# Patient Record
Sex: MALE | Race: WHITE | NOT HISPANIC OR LATINO | Employment: OTHER | URBAN - METROPOLITAN AREA
[De-identification: names, ages, dates, MRNs, and addresses within clinical notes are randomized per-mention and may not be internally consistent; named-entity substitution may affect disease eponyms.]

---

## 2018-12-19 ENCOUNTER — OFFICE VISIT (OUTPATIENT)
Dept: CARDIOLOGY | Facility: CLINIC | Age: 53
End: 2018-12-19

## 2018-12-19 VITALS
HEIGHT: 71 IN | DIASTOLIC BLOOD PRESSURE: 76 MMHG | WEIGHT: 202.8 LBS | BODY MASS INDEX: 28.39 KG/M2 | HEART RATE: 74 BPM | SYSTOLIC BLOOD PRESSURE: 118 MMHG

## 2018-12-19 DIAGNOSIS — R94.31 ABNORMAL EKG: ICD-10-CM

## 2018-12-19 DIAGNOSIS — I35.0 NONRHEUMATIC AORTIC VALVE STENOSIS: ICD-10-CM

## 2018-12-19 DIAGNOSIS — Q23.1 BICUSPID AORTIC VALVE: Primary | ICD-10-CM

## 2018-12-19 PROCEDURE — 99204 OFFICE O/P NEW MOD 45 MIN: CPT | Performed by: INTERNAL MEDICINE

## 2018-12-19 PROCEDURE — 93000 ELECTROCARDIOGRAM COMPLETE: CPT | Performed by: INTERNAL MEDICINE

## 2018-12-19 RX ORDER — PRAVASTATIN SODIUM 20 MG
TABLET ORAL
COMMUNITY
Start: 2018-11-29 | End: 2019-09-17 | Stop reason: ALTCHOICE

## 2018-12-26 ENCOUNTER — HOSPITAL ENCOUNTER (OUTPATIENT)
Dept: CT IMAGING | Facility: HOSPITAL | Age: 53
Discharge: HOME | End: 2018-12-26
Attending: INTERNAL MEDICINE

## 2018-12-26 ENCOUNTER — HOSPITAL ENCOUNTER (OUTPATIENT)
Dept: CARDIOLOGY | Facility: HOSPITAL | Age: 53
Discharge: HOME | End: 2018-12-26
Attending: INTERNAL MEDICINE | Admitting: INTERNAL MEDICINE

## 2018-12-26 VITALS — HEIGHT: 71 IN | HEART RATE: 68 BPM | WEIGHT: 200 LBS | BODY MASS INDEX: 28 KG/M2

## 2018-12-26 DIAGNOSIS — I35.0 NONRHEUMATIC AORTIC VALVE STENOSIS: ICD-10-CM

## 2018-12-26 DIAGNOSIS — Q23.1 BICUSPID AORTIC VALVE: ICD-10-CM

## 2018-12-26 LAB
ASCENDING AORTA: 3.3 CM
BH CV ECHO MEAS - AO MAX PG (FULL): 73.7 MMHG
BH CV ECHO MEAS - AO MAX PG: 80 MMHG
BH CV ECHO MEAS - AO MEAN PG (FULL): 32.8 MMHG
BH CV ECHO MEAS - AO MEAN PG: 37 MMHG
BH CV ECHO MEAS - AO ROOT AREA (BSA CORRECTED): 0.53
BH CV ECHO MEAS - AO ROOT AREA: 0.97 CM^2
BH CV ECHO MEAS - AO ROOT DIAM: 1.1 CM
BH CV ECHO MEAS - AO V2 MAX: 449.3 CM/SEC
BH CV ECHO MEAS - AO V2 MEAN: 290.2 CM/SEC
BH CV ECHO MEAS - AO V2 VTI: 88.9 CM
BH CV ECHO MEAS - AVA(I,A): 0.93 CM^2
BH CV ECHO MEAS - AVA(I,D): 0.93 CM^2
BH CV ECHO MEAS - AVA(V,A): 0.77 CM^2
BH CV ECHO MEAS - AVA(V,D): 0.77 CM^2
BH CV ECHO MEAS - BSA(HAYCOCK): 2.1 M^2
BH CV ECHO MEAS - BSA: 2.1 M^2
BH CV ECHO MEAS - BZI_BMI: 27.9 KILOGRAMS/M^2
BH CV ECHO MEAS - BZI_METRIC_HEIGHT: 180.3 CM
BH CV ECHO MEAS - BZI_METRIC_WEIGHT: 90.7 KG
BH CV ECHO MEAS - EDV(MOD-SP2): 83 ML
BH CV ECHO MEAS - EDV(MOD-SP4): 86 ML
BH CV ECHO MEAS - EDV(TEICH): 142.3 ML
BH CV ECHO MEAS - EF(CUBED): 75.7 %
BH CV ECHO MEAS - EF(MOD-SP2): 69.9 %
BH CV ECHO MEAS - EF(MOD-SP4): 69.8 %
BH CV ECHO MEAS - EF(TEICH): 67.1 %
BH CV ECHO MEAS - ESV(MOD-SP2): 25 ML
BH CV ECHO MEAS - ESV(MOD-SP4): 26 ML
BH CV ECHO MEAS - ESV(TEICH): 46.8 ML
BH CV ECHO MEAS - IVS/LVPW: 1.2
BH CV ECHO MEAS - IVSD: 1.5 CM
BH CV ECHO MEAS - LAT PEAK E' VEL: 10 CM/SEC
BH CV ECHO MEAS - LV DIASTOLIC VOL/BSA (35-75): 40.8 ML/M^2
BH CV ECHO MEAS - LV MASS(C)D: 317.7 GRAMS
BH CV ECHO MEAS - LV MASS(C)DI: 150.6 GRAMS/M^2
BH CV ECHO MEAS - LV MAX PG: 7 MMHG
BH CV ECHO MEAS - LV MEAN PG: 4.8 MMHG
BH CV ECHO MEAS - LV SYSTOLIC VOL/BSA (12-30): 12.3 ML/M^2
BH CV ECHO MEAS - LV V1 MAX: 132.4 CM/SEC
BH CV ECHO MEAS - LV V1 MEAN: 102.3 CM/SEC
BH CV ECHO MEAS - LV V1 VTI: 31.7 CM
BH CV ECHO MEAS - LVIDD: 5.4 CM
BH CV ECHO MEAS - LVIDS: 3.4 CM
BH CV ECHO MEAS - LVLD AP2: 7.4 CM
BH CV ECHO MEAS - LVLD AP4: 7.9 CM
BH CV ECHO MEAS - LVLS AP2: 6.2 CM
BH CV ECHO MEAS - LVLS AP4: 5.9 CM
BH CV ECHO MEAS - LVOT AREA (M): 2.5 CM^2
BH CV ECHO MEAS - LVOT AREA: 2.6 CM^2
BH CV ECHO MEAS - LVOT DIAM: 1.8 CM
BH CV ECHO MEAS - LVPWD: 1.3 CM
BH CV ECHO MEAS - MED PEAK E' VEL: 7 CM/SEC
BH CV ECHO MEAS - MV A DUR: 0.12 SEC
BH CV ECHO MEAS - MV A MAX VEL: 92.6 CM/SEC
BH CV ECHO MEAS - MV DEC SLOPE: 429.1 CM/SEC^2
BH CV ECHO MEAS - MV DEC TIME: 0.21 SEC
BH CV ECHO MEAS - MV E MAX VEL: 96.5 CM/SEC
BH CV ECHO MEAS - MV E/A: 1
BH CV ECHO MEAS - MV MAX PG: 4.6 MMHG
BH CV ECHO MEAS - MV MEAN PG: 2.3 MMHG
BH CV ECHO MEAS - MV P1/2T MAX VEL: 94.6 CM/SEC
BH CV ECHO MEAS - MV P1/2T: 64.6 MSEC
BH CV ECHO MEAS - MV V2 MAX: 107.2 CM/SEC
BH CV ECHO MEAS - MV V2 MEAN: 71.1 CM/SEC
BH CV ECHO MEAS - MV V2 VTI: 36.1 CM
BH CV ECHO MEAS - MVA P1/2T LCG: 2.3 CM^2
BH CV ECHO MEAS - MVA(P1/2T): 3.4 CM^2
BH CV ECHO MEAS - MVA(VTI): 2.3 CM^2
BH CV ECHO MEAS - PA MAX PG (FULL): 9.7 MMHG
BH CV ECHO MEAS - PA MAX PG: 12.1 MMHG
BH CV ECHO MEAS - PA V2 MAX: 173.7 CM/SEC
BH CV ECHO MEAS - PULM A REVS DUR: 0.11 SEC
BH CV ECHO MEAS - PULM A REVS VEL: 28.6 CM/SEC
BH CV ECHO MEAS - PULM DIAS VEL: 59 CM/SEC
BH CV ECHO MEAS - PULM S/D: 1
BH CV ECHO MEAS - PULM SYS VEL: 59 CM/SEC
BH CV ECHO MEAS - PVA(V,A): 1.7 CM^2
BH CV ECHO MEAS - PVA(V,D): 1.7 CM^2
BH CV ECHO MEAS - QP/QS: 0.74
BH CV ECHO MEAS - RAP SYSTOLE: 3 MMHG
BH CV ECHO MEAS - RV MAX PG: 2.4 MMHG
BH CV ECHO MEAS - RV MEAN PG: 1.4 MMHG
BH CV ECHO MEAS - RV V1 MAX: 77.1 CM/SEC
BH CV ECHO MEAS - RV V1 MEAN: 53.5 CM/SEC
BH CV ECHO MEAS - RV V1 VTI: 16.4 CM
BH CV ECHO MEAS - RVOT AREA: 3.7 CM^2
BH CV ECHO MEAS - RVOT DIAM: 2.2 CM
BH CV ECHO MEAS - RVSP: 30 MMHG
BH CV ECHO MEAS - SI(AO): 41 ML/M^2
BH CV ECHO MEAS - SI(CUBED): 57 ML/M^2
BH CV ECHO MEAS - SI(LVOT): 39.3 ML/M^2
BH CV ECHO MEAS - SI(MOD-SP2): 27.5 ML/M^2
BH CV ECHO MEAS - SI(MOD-SP4): 28.5 ML/M^2
BH CV ECHO MEAS - SI(TEICH): 45.3 ML/M^2
BH CV ECHO MEAS - SUP REN AO DIAM: 1.6 CM
BH CV ECHO MEAS - SV(AO): 86.4 ML
BH CV ECHO MEAS - SV(CUBED): 120.2 ML
BH CV ECHO MEAS - SV(LVOT): 82.8 ML
BH CV ECHO MEAS - SV(MOD-SP2): 58 ML
BH CV ECHO MEAS - SV(MOD-SP4): 60 ML
BH CV ECHO MEAS - SV(RVOT): 61 ML
BH CV ECHO MEAS - SV(TEICH): 95.5 ML
BH CV ECHO MEAS - TAPSE (>1.6): 1.9 CM2
BH CV ECHO MEAS - TR MAX VEL: 260.6 CM/SEC
BH CV ECHO MEASUREMENTS AVERAGE E/E' RATIO: 11.35
BH CV XLRA - RV BASE: 3 CM
BH CV XLRA - TDI S': 14 CM/SEC
LEFT ATRIUM VOLUME INDEX: 22 ML/M2
SINUS: 3.4 CM
STJ: 3.5 CM

## 2018-12-26 PROCEDURE — 93306 TTE W/DOPPLER COMPLETE: CPT | Performed by: INTERNAL MEDICINE

## 2018-12-26 PROCEDURE — 93306 TTE W/DOPPLER COMPLETE: CPT

## 2018-12-26 NOTE — PROGRESS NOTES
Pt here for CT angio chest, given informed consent for iv contrast.  Pt on metformin, instructed to not take for 48 hr post angio and to call primary MD to get OK to resume metformin.  Pt refuses to get contrast, call to Dr Win to inform of this.  CT cancelled by Dr Win

## 2019-01-06 NOTE — PROGRESS NOTES
Date of Office Visit: 2018  Encounter Provider: Brayden Win MD  Place of Service: [facility] CARDIOLOGY  Patient Name: Brigido Caraballo  :1965    Chief complaint: Bicuspid aortic valve, aortic stenosis, abnormal baseline EKG    History of Present Illness:    I had the pleasure of seeing the patient in cardiology office on 2018.  He is a   very pleasant 53 year-old white male with a past medical history significant for a   bicuspid aortic valve, aortic stenosis, diabetes, and hypertension who presents to   \[facility]\"" care.  The patient has formerly followed with Dr. Phani Forde at [facility], but will be switching care to me today.    Patient has a history of a bicuspid aortic valve with aortic stenosis.  His echo on   2015 showed a normal ejection fraction, a bicuspid aortic valve, and mild to   moderate aortic stenosis with a peak gradient of 34 mmHg, and a mean gradient   of 20 mmHg.  He has never had symptoms from the aortic stenosis in the past.    He also has had diabetes since , and has hypertension.  He quit smoking in   2011, although he smoked approximately one pack of cigarettes per day   for 25 years prior to that.  He does have obstructive sleep apnea, although he is   intolerant to CPAP machine.  He also has baseline T-wave inversions in his   anterolateral leads, which appear to be consistent from his prior EKG on 2015.    On direct questioning, he has had no chest pain, near-syncope, or syncope.  He   does state that he gets short of breath when going up and down steps.  However,   this has not changed significantly within the last year.  He does take antibiotics   prior to dental appointments as well.    Past Medical History:   Diagnosis Date   • Abnormal EKG     Baseline anterolateral T wave abnormalities    • Aortic stenosis    • Bicuspid aortic valve    • Diabetes (CMS/[facility])    • Hyperlipidemia    •  Hypertension    • ZENON (obstructive sleep apnea)     Intolerant to CPAP       History reviewed. No pertinent surgical history.    Current Outpatient Medications on File Prior to Visit   Medication Sig Dispense Refill   • aspirin 81 MG EC tablet Take 81 mg by mouth Daily.     • Coenzyme Q10 10 MG capsule Take 200 mg by mouth Daily.     • Flaxseed, Linseed, (FLAX SEEDS PO) Take  by mouth.     • Garlic 500 MG tablet Take 1 tablet by mouth Daily.     • Dalila, Zingiber officinalis, (DALILA ROOT) 500 MG capsule Take  by mouth Daily.     • metFORMIN (GLUCOPHAGE) 500 MG tablet Take 500 mg by mouth 2 (Two) Times a Day.     • pravastatin (PRAVACHOL) 20 MG tablet      • propranolol (INDERAL) 10 MG tablet Take 10 mg by mouth 3 (Three) Times a Day.     • Cholecalciferol (VITAMIN D3) 5000 units capsule capsule Take  by mouth Daily.       No current facility-administered medications on file prior to visit.      Allergies as of 12/19/2018   • (Not on File)     Social History     Socioeconomic History   • Marital status:      Spouse name: Not on file   • Number of children: Not on file   • Years of education: Not on file   • Highest education level: Not on file   Social Needs   • Financial resource strain: Not on file   • Food insecurity - worry: Not on file   • Food insecurity - inability: Not on file   • Transportation needs - medical: Not on file   • Transportation needs - non-medical: Not on file   Occupational History   • Not on file   Tobacco Use   • Smoking status: Former Smoker   • Smokeless tobacco: Never Used   • Tobacco comment: Smoked 1 ppd for 25 years - quit 2/2011   Substance and Sexual Activity   • Alcohol use: No     Frequency: Never   • Drug use: No   • Sexual activity: Not on file   Other Topics Concern   • Not on file   Social History Narrative   • Not on file     Family History   Problem Relation Age of Onset   • Coronary artery disease Mother    • Aortic stenosis Mother    • Stroke Mother    • Diabetes  "Father    • Stroke Father        Review of Systems   Constitution: Positive for malaise/fatigue.   Eyes: Positive for blurred vision.   Cardiovascular: Positive for dyspnea on exertion.   Respiratory: Positive for snoring.    Musculoskeletal: Positive for joint pain.   Genitourinary: Positive for frequency.   Neurological: Positive for excessive daytime sleepiness.   All other systems reviewed and are negative.     Objective:     Vitals:    12/19/18 1303   BP: 118/76   Pulse: 74   Weight: 92 kg (202 lb 12.8 oz)   Height: 180.3 cm (71\")     Body mass index is 28.28 kg/m².    Physical Exam   Constitutional: He is oriented to person, place, and time. He appears well-developed and well-nourished.   HENT:   Head: Normocephalic and atraumatic.   Eyes: Conjunctivae are normal.   Neck: Neck supple.   Cardiovascular: Normal rate and regular rhythm. Exam reveals no gallop and no friction rub.   Murmur heard.   Systolic murmur is present with a grade of 3/6 at the upper right sternal border, upper left sternal border and lower left sternal border.  Pulmonary/Chest: Effort normal and breath sounds normal.   Abdominal: Soft. There is no tenderness.   Musculoskeletal: He exhibits no edema.   Neurological: He is alert and oriented to person, place, and time.   Skin: Skin is warm.   Psychiatric: He has a normal mood and affect. His behavior is normal.     Lab Review:     ECG 12 Lead  Date/Time: 12/19/2018 1:26 PM  Performed by: Brayden Win MD  Authorized by: Brayden Win MD   Comparison: compared with previous ECG from 9/25/2015  Comparison to previous ECG: Compared to the previous study, T wave changes are less prominent in the inferior leads currently  Rhythm: sinus rhythm  Rate: normal  BPM: 74  Clinical impression: abnormal ECG  Comments: T wave changes in the anterolateral leads, consider ischemia             Cardiac Procedures:  1.  Lexiscan cardiac stress test on 9/25/2015: Normal with no ischemia.  2.  " Echocardiogram on 9/25/2015: Ejection fraction 63%.  There was normal diastolic   function.  There was a bicuspid aortic valve.  There was mild to moderate aortic stenosis   with a peak gradient of 34 mmHg, and a mean gradient of 20 mmHg.    Assessment:       Diagnosis Plan   1. Bicuspid aortic valve  Adult Transthoracic Echo Complete W/ Cont if Necessary Per Protocol   2. Nonrheumatic aortic valve stenosis  Adult Transthoracic Echo Complete W/ Cont if Necessary Per Protocol   3. Abnormal EKG       Plan:       The patient has not seen Dr. Forde since September 2015.  Again, his main issue is   shortness of breath with both going up and down stairs.  However, this has not changed   significantly.  He has had no chest pain, syncope, or near syncope.  His last echo in 2015   showed mild to moderate aortic stenosis.  I have recommended rechecking an   echocardiogram at this point.  He also has not had any evaluation of his thoracic aorta   recently.  Given the association of bicuspid aortic valve to thoracic aneurysms, I would   like to also check a CT angiogram of his chest.  I did advise him to take prophylactic   antibiotics prior to dental appointments and dental cleanings, which he already does.  His   blood pressure appears to be controlled.  Again, his baseline EKG does have significant   T-wave inversion in the anterolateral leads, although this appears to be consistent with   prior EKGs as well.  Further plans will be made pending his testing.  I will likely see him   back in 6 months.

## 2019-08-07 ENCOUNTER — TELEPHONE (OUTPATIENT)
Dept: CARDIOLOGY | Facility: CLINIC | Age: 54
End: 2019-08-07

## 2019-08-07 DIAGNOSIS — R07.2 PRECORDIAL PAIN: ICD-10-CM

## 2019-08-07 DIAGNOSIS — I35.0 NONRHEUMATIC AORTIC VALVE STENOSIS: Primary | ICD-10-CM

## 2019-08-07 NOTE — TELEPHONE ENCOUNTER
"Brigido Caraballo   1965  Home Phone [phone]   Mobile [phone]       Lilia Berry.    I'm one of the new Triage RNs. Brigido has an appointment with you August 19th. He is having \"pinching\" chest pain of 3-4 on exertion and in the heat outside. He called to ask if you like him to have any testing completed before his appt on the 19th or the day of the appt?    Vikki Lou, RN  Triage RN [facility]  "

## 2019-08-07 NOTE — TELEPHONE ENCOUNTER
Lilia,    Will you call and schedule Mr. Caraballo for an echo and nuclear stress test before his appointment with STEVIE on the 19th?    Thank you!    Vikki Rosario RN [facility]

## 2019-08-12 ENCOUNTER — HOSPITAL ENCOUNTER (OUTPATIENT)
Dept: CARDIOLOGY | Facility: HOSPITAL | Age: 54
Discharge: HOME | End: 2019-08-12

## 2019-08-12 ENCOUNTER — HOSPITAL ENCOUNTER (OUTPATIENT)
Dept: CARDIOLOGY | Facility: HOSPITAL | Age: 54
Discharge: HOME | End: 2019-08-12
Admitting: NURSE PRACTITIONER

## 2019-08-12 VITALS — HEIGHT: 70 IN | BODY MASS INDEX: 28.63 KG/M2 | WEIGHT: 200 LBS | HEART RATE: 70 BPM

## 2019-08-12 VITALS — BODY MASS INDEX: 28.63 KG/M2 | HEIGHT: 70 IN | WEIGHT: 199.96 LBS

## 2019-08-12 DIAGNOSIS — I35.0 NONRHEUMATIC AORTIC VALVE STENOSIS: ICD-10-CM

## 2019-08-12 DIAGNOSIS — R07.2 PRECORDIAL PAIN: ICD-10-CM

## 2019-08-12 LAB
AORTIC ARCH: 2.9 CM
AORTIC DIMENSIONLESS INDEX: 0.3 (DI)
AORTIC ROOT ANNULUS: 2.2 CM
ASCENDING AORTA: 4.1 CM
BH CV ECHO MEAS - ACS: 1.5 CM
BH CV ECHO MEAS - AO MAX PG (FULL): 51.8 MMHG
BH CV ECHO MEAS - AO MAX PG: 57 MMHG
BH CV ECHO MEAS - AO MEAN PG (FULL): 30.2 MMHG
BH CV ECHO MEAS - AO MEAN PG: 34 MMHG
BH CV ECHO MEAS - AO V2 MAX: 377.6 CM/SEC
BH CV ECHO MEAS - AO V2 MEAN: 267.4 CM/SEC
BH CV ECHO MEAS - AO V2 VTI: 83.8 CM
BH CV ECHO MEAS - AVA(I,A): 1 CM^2
BH CV ECHO MEAS - AVA(I,D): 1 CM^2
BH CV ECHO MEAS - AVA(V,A): 0.96 CM^2
BH CV ECHO MEAS - AVA(V,D): 0.96 CM^2
BH CV ECHO MEAS - BSA(HAYCOCK): 2.1 M^2
BH CV ECHO MEAS - BSA: 2.1 M^2
BH CV ECHO MEAS - BZI_BMI: 27.9 KILOGRAMS/M^2
BH CV ECHO MEAS - BZI_METRIC_HEIGHT: 180.3 CM
BH CV ECHO MEAS - BZI_METRIC_WEIGHT: 90.7 KG
BH CV ECHO MEAS - EDV(MOD-SP2): 93 ML
BH CV ECHO MEAS - EDV(MOD-SP4): 104 ML
BH CV ECHO MEAS - EDV(TEICH): 113.7 ML
BH CV ECHO MEAS - EF(CUBED): 87.5 %
BH CV ECHO MEAS - EF(MOD-BP): 67 %
BH CV ECHO MEAS - EF(MOD-SP2): 66.7 %
BH CV ECHO MEAS - EF(MOD-SP4): 65.4 %
BH CV ECHO MEAS - EF(TEICH): 81.2 %
BH CV ECHO MEAS - ESV(MOD-SP2): 31 ML
BH CV ECHO MEAS - ESV(MOD-SP4): 36 ML
BH CV ECHO MEAS - ESV(TEICH): 21.4 ML
BH CV ECHO MEAS - FS: 50 %
BH CV ECHO MEAS - IVS/LVPW: 1.1
BH CV ECHO MEAS - IVSD: 1.2 CM
BH CV ECHO MEAS - LAT PEAK E' VEL: 10 CM/SEC
BH CV ECHO MEAS - LV DIASTOLIC VOL/BSA (35-75): 49.3 ML/M^2
BH CV ECHO MEAS - LV MASS(C)D: 215 GRAMS
BH CV ECHO MEAS - LV MASS(C)DI: 102 GRAMS/M^2
BH CV ECHO MEAS - LV MAX PG: 5.3 MMHG
BH CV ECHO MEAS - LV MEAN PG: 3.4 MMHG
BH CV ECHO MEAS - LV SYSTOLIC VOL/BSA (12-30): 17.1 ML/M^2
BH CV ECHO MEAS - LV V1 MAX: 115 CM/SEC
BH CV ECHO MEAS - LV V1 MEAN: 89 CM/SEC
BH CV ECHO MEAS - LV V1 VTI: 27.9 CM
BH CV ECHO MEAS - LVIDD: 4.9 CM
BH CV ECHO MEAS - LVIDS: 2.5 CM
BH CV ECHO MEAS - LVLD AP2: 8.6 CM
BH CV ECHO MEAS - LVLD AP4: 8.3 CM
BH CV ECHO MEAS - LVLS AP2: 7.3 CM
BH CV ECHO MEAS - LVLS AP4: 7.3 CM
BH CV ECHO MEAS - LVOT AREA (M): 3.1 CM^2
BH CV ECHO MEAS - LVOT AREA: 3.1 CM^2
BH CV ECHO MEAS - LVOT DIAM: 2 CM
BH CV ECHO MEAS - LVPWD: 1.1 CM
BH CV ECHO MEAS - MED PEAK E' VEL: 9 CM/SEC
BH CV ECHO MEAS - MR MAX PG: 26.4 MMHG
BH CV ECHO MEAS - MR MAX VEL: 257 CM/SEC
BH CV ECHO MEAS - MV A DUR: 0.12 SEC
BH CV ECHO MEAS - MV A MAX VEL: 73.2 CM/SEC
BH CV ECHO MEAS - MV DEC SLOPE: 516.6 CM/SEC^2
BH CV ECHO MEAS - MV DEC TIME: 0.16 SEC
BH CV ECHO MEAS - MV E MAX VEL: 91.7 CM/SEC
BH CV ECHO MEAS - MV E/A: 1.3
BH CV ECHO MEAS - MV MAX PG: 4 MMHG
BH CV ECHO MEAS - MV MEAN PG: 2.1 MMHG
BH CV ECHO MEAS - MV P1/2T MAX VEL: 90.7 CM/SEC
BH CV ECHO MEAS - MV P1/2T: 51.4 MSEC
BH CV ECHO MEAS - MV V2 MAX: 100.1 CM/SEC
BH CV ECHO MEAS - MV V2 MEAN: 66.9 CM/SEC
BH CV ECHO MEAS - MV V2 VTI: 32.7 CM
BH CV ECHO MEAS - MVA P1/2T LCG: 2.4 CM^2
BH CV ECHO MEAS - MVA(P1/2T): 4.3 CM^2
BH CV ECHO MEAS - MVA(VTI): 2.7 CM^2
BH CV ECHO MEAS - PA ACC TIME: 0.15 SEC
BH CV ECHO MEAS - PA MAX PG (FULL): 5.2 MMHG
BH CV ECHO MEAS - PA MAX PG: 7.5 MMHG
BH CV ECHO MEAS - PA PR(ACCEL): 10.9 MMHG
BH CV ECHO MEAS - PA V2 MAX: 136.7 CM/SEC
BH CV ECHO MEAS - PULM A REVS DUR: 0.07 SEC
BH CV ECHO MEAS - PULM A REVS VEL: 24.7 CM/SEC
BH CV ECHO MEAS - PULM DIAS VEL: 40.7 CM/SEC
BH CV ECHO MEAS - PULM S/D: 1.4
BH CV ECHO MEAS - PULM SYS VEL: 57.2 CM/SEC
BH CV ECHO MEAS - PVA(V,A): 1.7 CM^2
BH CV ECHO MEAS - PVA(V,D): 1.7 CM^2
BH CV ECHO MEAS - QP/QS: 0.61
BH CV ECHO MEAS - RAP SYSTOLE: 3 MMHG
BH CV ECHO MEAS - RV MAX PG: 2.2 MMHG
BH CV ECHO MEAS - RV MEAN PG: 1.3 MMHG
BH CV ECHO MEAS - RV V1 MAX: 74.7 CM/SEC
BH CV ECHO MEAS - RV V1 MEAN: 53.9 CM/SEC
BH CV ECHO MEAS - RV V1 VTI: 16.9 CM
BH CV ECHO MEAS - RVOT AREA: 3.1 CM^2
BH CV ECHO MEAS - RVOT DIAM: 2 CM
BH CV ECHO MEAS - RVSP: 29 MMHG
BH CV ECHO MEAS - SI(CUBED): 49.3 ML/M^2
BH CV ECHO MEAS - SI(LVOT): 41.6 ML/M^2
BH CV ECHO MEAS - SI(MOD-SP2): 29.4 ML/M^2
BH CV ECHO MEAS - SI(MOD-SP4): 32.2 ML/M^2
BH CV ECHO MEAS - SI(TEICH): 43.8 ML/M^2
BH CV ECHO MEAS - SUP REN AO DIAM: 2 CM
BH CV ECHO MEAS - SV(CUBED): 104 ML
BH CV ECHO MEAS - SV(LVOT): 87.8 ML
BH CV ECHO MEAS - SV(MOD-SP2): 62 ML
BH CV ECHO MEAS - SV(MOD-SP4): 68 ML
BH CV ECHO MEAS - SV(RVOT): 53.1 ML
BH CV ECHO MEAS - SV(TEICH): 92.3 ML
BH CV ECHO MEAS - TAPSE (>1.6): 2.1 CM2
BH CV ECHO MEAS - TR MAX VEL: 256 CM/SEC
BH CV ECHO MEASUREMENTS AVERAGE E/E' RATIO: 9.65
BH CV NUCLEAR PRIOR STUDY: 3
BH CV STRESS BP STAGE 1: NORMAL
BH CV STRESS DURATION MIN STAGE 1: 3
BH CV STRESS DURATION MIN STAGE 2: 1
BH CV STRESS DURATION SEC STAGE 1: 0
BH CV STRESS DURATION SEC STAGE 2: 30
BH CV STRESS GRADE STAGE 1: 10
BH CV STRESS GRADE STAGE 2: 12
BH CV STRESS HR STAGE 1: 112
BH CV STRESS HR STAGE 2: 120
BH CV STRESS METS STAGE 1: 5
BH CV STRESS METS STAGE 2: 7.5
BH CV STRESS PROTOCOL 1: NORMAL
BH CV STRESS RECOVERY BP: NORMAL MMHG
BH CV STRESS RECOVERY HR: 74 BPM
BH CV STRESS SPEED STAGE 1: 1.7
BH CV STRESS SPEED STAGE 2: 2.5
BH CV STRESS STAGE 1: 1
BH CV STRESS STAGE 2: 2
BH CV XLRA - RV BASE: 2.6 CM
BH CV XLRA - TDI S': 10 CM/SEC
LEFT ATRIUM VOLUME INDEX: 22 ML/M2
LV EF NUC BP: 59 %
MAXIMAL PREDICTED HEART RATE: 168 BPM
MAXIMAL PREDICTED HEART RATE: 168 BPM
PERCENT MAX PREDICTED HR: 71.43 %
SINUS: 3.6 CM
STJ: 2.9 CM
STRESS BASELINE BP: NORMAL MMHG
STRESS BASELINE HR: 71 BPM
STRESS PERCENT HR: 84 %
STRESS POST ESTIMATED WORKLOAD: 6 METS
STRESS POST EXERCISE DUR MIN: 4 MIN
STRESS POST EXERCISE DUR SEC: 30 SEC
STRESS POST PEAK BP: NORMAL MMHG
STRESS POST PEAK HR: 120 BPM
STRESS TARGET HR: 143 BPM
STRESS TARGET HR: 143 BPM

## 2019-08-12 PROCEDURE — 93016 CV STRESS TEST SUPVJ ONLY: CPT | Performed by: INTERNAL MEDICINE

## 2019-08-12 PROCEDURE — 0 TECHNETIUM TETROFOSMIN KIT: Performed by: NURSE PRACTITIONER

## 2019-08-12 PROCEDURE — A9502 TC99M TETROFOSMIN: HCPCS | Performed by: NURSE PRACTITIONER

## 2019-08-12 PROCEDURE — 93306 TTE W/DOPPLER COMPLETE: CPT | Performed by: INTERNAL MEDICINE

## 2019-08-12 PROCEDURE — 93018 CV STRESS TEST I&R ONLY: CPT | Performed by: INTERNAL MEDICINE

## 2019-08-12 PROCEDURE — 78452 HT MUSCLE IMAGE SPECT MULT: CPT | Performed by: INTERNAL MEDICINE

## 2019-08-12 PROCEDURE — 93306 TTE W/DOPPLER COMPLETE: CPT

## 2019-08-12 PROCEDURE — 93017 CV STRESS TEST TRACING ONLY: CPT

## 2019-08-12 PROCEDURE — 78452 HT MUSCLE IMAGE SPECT MULT: CPT

## 2019-08-12 RX ADMIN — TETROFOSMIN 1 DOSE: 1.38 INJECTION, POWDER, LYOPHILIZED, FOR SOLUTION INTRAVENOUS at 12:45

## 2019-08-12 RX ADMIN — TETROFOSMIN 1 DOSE: 1.38 INJECTION, POWDER, LYOPHILIZED, FOR SOLUTION INTRAVENOUS at 12:01

## 2019-08-13 DIAGNOSIS — I77.810 ASCENDING AORTA DILATATION (HCC): Primary | ICD-10-CM

## 2019-08-14 ENCOUNTER — TELEPHONE (OUTPATIENT)
Dept: CARDIOLOGY | Facility: CLINIC | Age: 54
End: 2019-08-14

## 2019-09-09 ENCOUNTER — HOSPITAL ENCOUNTER (OUTPATIENT)
Dept: CT IMAGING | Facility: HOSPITAL | Age: 54
Discharge: HOME | End: 2019-09-09
Admitting: INTERNAL MEDICINE

## 2019-09-09 DIAGNOSIS — I77.810 ASCENDING AORTA DILATATION (HCC): ICD-10-CM

## 2019-09-09 PROCEDURE — 71250 CT THORAX DX C-: CPT

## 2019-09-10 DIAGNOSIS — I35.0 NONRHEUMATIC AORTIC VALVE STENOSIS: ICD-10-CM

## 2019-09-10 DIAGNOSIS — I71.21 ASCENDING AORTIC ANEURYSM (HCC): Primary | ICD-10-CM

## 2019-09-11 NOTE — H&P (VIEW-ONLY)
Called and spoke to the patient and his wife.  He needs surgical referral for the aortic aneurysm.  I also feel he is symptomatic from his AS.  I am going to refer him to Dr. Shell.  Consultation order has been placed.    GM

## 2019-09-17 ENCOUNTER — OFFICE VISIT (OUTPATIENT)
Dept: CARDIAC SURGERY | Facility: CLINIC | Age: 54
End: 2019-09-17

## 2019-09-17 VITALS
WEIGHT: 200 LBS | SYSTOLIC BLOOD PRESSURE: 127 MMHG | DIASTOLIC BLOOD PRESSURE: 84 MMHG | BODY MASS INDEX: 28 KG/M2 | RESPIRATION RATE: 20 BRPM | HEART RATE: 62 BPM | OXYGEN SATURATION: 97 % | TEMPERATURE: 98.3 F | HEIGHT: 71 IN

## 2019-09-17 DIAGNOSIS — E11.40 TYPE 2 DIABETES MELLITUS WITH DIABETIC NEUROPATHY, WITHOUT LONG-TERM CURRENT USE OF INSULIN (HCC): ICD-10-CM

## 2019-09-17 DIAGNOSIS — I10 HTN (HYPERTENSION), BENIGN: ICD-10-CM

## 2019-09-17 DIAGNOSIS — E11.9 TYPE 2 DIABETES MELLITUS WITHOUT COMPLICATION, WITHOUT LONG-TERM CURRENT USE OF INSULIN (HCC): ICD-10-CM

## 2019-09-17 DIAGNOSIS — I71.21 ASCENDING AORTIC ANEURYSM (HCC): ICD-10-CM

## 2019-09-17 DIAGNOSIS — R94.31 ABNORMAL EKG: ICD-10-CM

## 2019-09-17 DIAGNOSIS — Q23.1 BICUSPID AORTIC VALVE: ICD-10-CM

## 2019-09-17 DIAGNOSIS — E66.9 OBESITY (BMI 30-39.9): ICD-10-CM

## 2019-09-17 DIAGNOSIS — I71.21 ASCENDING AORTIC ANEURYSM (HCC): Primary | ICD-10-CM

## 2019-09-17 DIAGNOSIS — G47.33 OSA (OBSTRUCTIVE SLEEP APNEA): ICD-10-CM

## 2019-09-17 DIAGNOSIS — I35.0 NONRHEUMATIC AORTIC VALVE STENOSIS: ICD-10-CM

## 2019-09-17 DIAGNOSIS — I35.0 AORTIC STENOSIS, SEVERE: Primary | ICD-10-CM

## 2019-09-17 PROBLEM — E11.49 TYPE 2 DIABETES MELLITUS WITH NEUROLOGIC COMPLICATION, WITHOUT LONG-TERM CURRENT USE OF INSULIN: Status: ACTIVE | Noted: 2019-09-17

## 2019-09-17 PROBLEM — Q23.81 BICUSPID AORTIC VALVE: Status: ACTIVE | Noted: 2019-09-17

## 2019-09-17 PROBLEM — E11.49 TYPE 2 DIABETES MELLITUS WITH NEUROLOGIC COMPLICATION, WITHOUT LONG-TERM CURRENT USE OF INSULIN: Status: RESOLVED | Noted: 2019-09-17 | Resolved: 2019-09-17

## 2019-09-17 PROCEDURE — 99205 OFFICE O/P NEW HI 60 MIN: CPT | Performed by: THORACIC SURGERY (CARDIOTHORACIC VASCULAR SURGERY)

## 2019-09-17 RX ORDER — ATORVASTATIN CALCIUM 10 MG/1
10 TABLET, FILM COATED ORAL NIGHTLY
COMMUNITY
Start: 2019-09-13 | End: 2020-11-30

## 2019-09-17 NOTE — PROGRESS NOTES
9/17/2019    Chief Complaint     Evaluation of aortic stenosis    History of Present Illness:       Dear Dr. Win, Brayden YARBROUGH MD and Colleagues,  It was nice to see Brigido Caraballo in consultation at your request. He is a 54 y.o. male with a history of diabetes, obesity, hypertension, hyperlipidemia, ZENON and known heart murmur with mild to moderate aortic stenosis who has developed progressive dyspnea of exertion and fatigue.  She was last seen by cardiology in 2015 when he was diagnosed with mild aortic stenosis.  He was lost to follow-up.  He states he fatigues easily, he notices a decline and he has dyspnea with exertion that worsens with climbing stairs.  He had an EKG that showed ST depressions.  He had a nuclear stress test that showed ST and T wave abnormalities on exertion and developed chest pain. He denies TIA, syncope, angina, PND, lower extremity edema or palpitations.  He had an echocardiogram that showed severe stenosis and mean gradient of 34 mmHg, aortic valve area of 1.0 cm², aortic valve dimensionless index of 23 and an aortic peak velocity of 3.8 m/s.  The aortic valve was bicuspid he had mild tricuspid regurgitation and the EF was 67%.  He had a CT scan of the chest that showed an ascending thoracic aorta 5 cm, proximal arch of 3.8 to 4 cm and only mild dilatation of the aortic root.  Her mother had a bicuspid aortic valve and required an AVR in the past, followed by a TAVR performed by me.    Patient Active Problem List   Diagnosis   • Ascending aortic aneurysm (CMS/HCC)   • Obesity (BMI 30-39.9)   • Aortic stenosis, severe   • ZENON (obstructive sleep apnea)   • HTN (hypertension), benign   • Bicuspid aortic valve   • Abnormal EKG   • Type 2 diabetes mellitus without complication, without long-term current use of insulin (CMS/HCC)       Past Medical History:   Diagnosis Date   • Abnormal EKG     Baseline anterolateral T wave abnormalities    • Aortic stenosis    • Bicuspid aortic valve    •  Diabetes (CMS/[facility]) 2011   • Hyperlipidemia    • Hypertension    • ZENON (obstructive sleep apnea)     Intolerant to CPAP       PSH:  Negative    No Known Allergies      Current Outpatient Medications:   •  aspirin 81 MG EC tablet, Take 81 mg by mouth Daily., Disp: , Rfl:   •  atorvastatin (LIPITOR) 10 MG tablet, , Disp: , Rfl:   •  Cholecalciferol (VITAMIN D3) 5000 units capsule capsule, Take  by mouth Daily., Disp: , Rfl:   •  Coenzyme Q10 10 MG capsule, Take 200 mg by mouth Daily., Disp: , Rfl:   •  Flaxseed, Linseed, (FLAX SEEDS PO), Take  by mouth., Disp: , Rfl:   •  Garlic 500 MG tablet, Take 1 tablet by mouth Daily., Disp: , Rfl:   •  Dalila, Zingiber officinalis, (DALILA ROOT) 500 MG capsule, Take  by mouth Daily., Disp: , Rfl:   •  metFORMIN (GLUCOPHAGE) 1000 MG tablet, , Disp: , Rfl:   •  metFORMIN (GLUCOPHAGE) 500 MG tablet, Take 500 mg by mouth 2 (Two) Times a Day., Disp: , Rfl:   •  propranolol (INDERAL) 10 MG tablet, Take 10 mg by mouth 3 (Three) Times a Day., Disp: , Rfl:     Social History     Socioeconomic History   • Marital status:      Spouse name: Not on file   • Number of children: Not on file   • Years of education: Not on file   • Highest education level: Not on file   Tobacco Use   • Smoking status: Former Smoker   • Smokeless tobacco: Never Used   • Tobacco comment: Smoked 1 ppd for 25 years - quit 2/2011   Substance and Sexual Activity   • Alcohol use: No     Frequency: Never   • Drug use: No       Family History   Problem Relation Age of Onset   • Coronary artery disease Mother    • Aortic stenosis Mother    • Stroke Mother    • Diabetes Father    • Stroke Father      Review of Systems   Constitutional: Positive for fatigue.   Respiratory: Positive for shortness of breath. Negative for cough and wheezing.    Cardiovascular: Negative for chest pain, palpitations and leg swelling.   Neurological: Negative for syncope, weakness and light-headedness.   All other systems reviewed and are  negative.      Physical Exam:    Vital Signs:  Weight: 90.7 kg (200 lb)   Body mass index is 27.89 kg/m².  Temp: 98.3 °F (36.8 °C)   Heart Rate: 62   BP: 127/84     Physical Exam   Constitutional: He is oriented to person, place, and time. He appears well-developed and well-nourished.   HENT:   Head: Normocephalic and atraumatic.   Nose: Nose normal.   Mouth/Throat: Oropharynx is clear and moist.   Eyes: Conjunctivae are normal. Pupils are equal, round, and reactive to light.   Neck: Normal range of motion. Neck supple. No JVD present. No thyromegaly present.   Cardiovascular: Normal rate, regular rhythm, S1 normal, S2 normal and intact distal pulses. PMI is not displaced. Exam reveals no gallop, no friction rub and no decreased pulses.   Murmur heard.  Pulses:       Radial pulses are 2+ on the right side, and 2+ on the left side.        Dorsalis pedis pulses are 2+ on the right side, and 2+ on the left side.   3/6 systolic murmur in aortic area   Pulmonary/Chest: Effort normal and breath sounds normal. He has no rales.   Abdominal: Soft. Bowel sounds are normal. He exhibits no distension and no mass. There is no tenderness.   Musculoskeletal: Normal range of motion. He exhibits no tenderness or deformity.   Lymphadenopathy:     He has no cervical adenopathy.   Neurological: He is alert and oriented to person, place, and time. He has normal reflexes.   Skin: Skin is warm and dry. No rash noted. No erythema.   Psychiatric: He has a normal mood and affect. His behavior is normal.   No groin or neck adenopathy     Assessment:     Problem List Items Addressed This Visit        Cardiovascular and Mediastinum    Ascending aortic aneurysm (CMS/HCC)    Aortic stenosis, severe - Primary    HTN (hypertension), benign    Bicuspid aortic valve    Abnormal EKG       Respiratory    ZENON (obstructive sleep apnea)       Digestive    Obesity (BMI 30-39.9)       Endocrine    Type 2 diabetes mellitus without complication, without  long-term current use of insulin (CMS/[facility])    Relevant Medications    metFORMIN (GLUCOPHAGE) 1000 MG tablet    RESOLVED: Type 2 diabetes mellitus with neurologic complication, without long-term current use of insulin (CMS/[facility])    Relevant Medications    metFORMIN (GLUCOPHAGE) 1000 MG tablet          1. Bicuspid aortopathy with a 5 cm ascending aortic aneurysm and mild to moderate dilatation of the aortic root and proximal aortic arch  2. Severe aortic stenosis, symptomatic  3. Further coronary artery disease, abnormal EKG and nuclear stress test  4. Diabetes, hypertension hyperlipidemia, controlled  5. Dyspnea of exertion    Recommendation/Plan:     I discussed the findings on the studies and the clinical correlation to the patient and his wife.  He has severe aortic stenosis and a 5 cm ascending aortic aneurysm with a bicuspid aortic valve.  I recommend an aortic valve replacement, ascending aortic aneurysm repair with possible leonora-arch.  I do not see a need of replacing the aortic root.  He will need a cardiac cath to further evaluate his coronary status before surgery.  I quoted a surgical mortality of less than 2% and complication risk of around 10%.  He wants to proceed with surgery.          Thank you for allowing me to participate in his care.    Regards,    Felipe Shell M.D.

## 2019-09-18 ENCOUNTER — TELEPHONE (OUTPATIENT)
Dept: CARDIAC SURGERY | Facility: CLINIC | Age: 54
End: 2019-09-18

## 2019-09-18 NOTE — TELEPHONE ENCOUNTER
PATIENT CALLED TO INQUIRE ABOUT ANY RESTRICTIONS IN ACTIVITY PRIOR TO SURGERY IN OctoberWITH DR CHO. PLEASE CALL HIM BACK [phone]

## 2019-09-19 ENCOUNTER — TRANSCRIBE ORDERS (OUTPATIENT)
Dept: CARDIOLOGY | Facility: CLINIC | Age: 54
End: 2019-09-19

## 2019-09-19 DIAGNOSIS — Z01.810 PREOP CARDIOVASCULAR EXAM: Primary | ICD-10-CM

## 2019-09-19 DIAGNOSIS — I71.21 ASCENDING AORTIC ANEURYSM (HCC): ICD-10-CM

## 2019-09-19 DIAGNOSIS — Z13.6 SCREENING FOR CARDIOVASCULAR CONDITION: ICD-10-CM

## 2019-09-19 PROBLEM — I35.0 NONRHEUMATIC AORTIC VALVE STENOSIS: Status: ACTIVE | Noted: 2019-09-19

## 2019-09-20 ENCOUNTER — LAB (OUTPATIENT)
Dept: LAB | Facility: HOSPITAL | Age: 54
End: 2019-09-20

## 2019-09-20 DIAGNOSIS — I71.21 ASCENDING AORTIC ANEURYSM (HCC): ICD-10-CM

## 2019-09-20 DIAGNOSIS — Z01.810 PREOP CARDIOVASCULAR EXAM: ICD-10-CM

## 2019-09-20 DIAGNOSIS — Z13.6 SCREENING FOR CARDIOVASCULAR CONDITION: ICD-10-CM

## 2019-09-20 LAB
ANION GAP SERPL CALCULATED.3IONS-SCNC: 13.5 MMOL/L (ref 5–15)
BASOPHILS # BLD AUTO: 0.03 10*3/MM3 (ref 0–0.2)
BASOPHILS NFR BLD AUTO: 0.5 % (ref 0–1.5)
BUN BLD-MCNC: 19 MG/DL (ref 6–20)
BUN/CREAT SERPL: 16.7 (ref 7–25)
CALCIUM SPEC-SCNC: 9.3 MG/DL (ref 8.6–10.5)
CHLORIDE SERPL-SCNC: 104 MMOL/L (ref 98–107)
CO2 SERPL-SCNC: 23.5 MMOL/L (ref 22–29)
CREAT BLD-MCNC: 1.14 MG/DL (ref 0.76–1.27)
DEPRECATED RDW RBC AUTO: 39.1 FL (ref 37–54)
EOSINOPHIL # BLD AUTO: 0.14 10*3/MM3 (ref 0–0.4)
EOSINOPHIL NFR BLD AUTO: 2.4 % (ref 0.3–6.2)
ERYTHROCYTE [DISTWIDTH] IN BLOOD BY AUTOMATED COUNT: 12.1 % (ref 12.3–15.4)
GFR SERPL CREATININE-BSD FRML MDRD: 67 ML/MIN/1.73
GLUCOSE BLD-MCNC: 118 MG/DL (ref 65–99)
HCT VFR BLD AUTO: 39.2 % (ref 37.5–51)
HGB BLD-MCNC: 13.2 G/DL (ref 13–17.7)
IMM GRANULOCYTES # BLD AUTO: 0.01 10*3/MM3 (ref 0–0.05)
IMM GRANULOCYTES NFR BLD AUTO: 0.2 % (ref 0–0.5)
LYMPHOCYTES # BLD AUTO: 1.53 10*3/MM3 (ref 0.7–3.1)
LYMPHOCYTES NFR BLD AUTO: 26.6 % (ref 19.6–45.3)
MCH RBC QN AUTO: 30.1 PG (ref 26.6–33)
MCHC RBC AUTO-ENTMCNC: 33.7 G/DL (ref 31.5–35.7)
MCV RBC AUTO: 89.5 FL (ref 79–97)
MONOCYTES # BLD AUTO: 0.5 10*3/MM3 (ref 0.1–0.9)
MONOCYTES NFR BLD AUTO: 8.7 % (ref 5–12)
NEUTROPHILS # BLD AUTO: 3.54 10*3/MM3 (ref 1.7–7)
NEUTROPHILS NFR BLD AUTO: 61.6 % (ref 42.7–76)
NRBC BLD AUTO-RTO: 0 /100 WBC (ref 0–0.2)
PLATELET # BLD AUTO: 270 10*3/MM3 (ref 140–450)
PMV BLD AUTO: 9.5 FL (ref 6–12)
POTASSIUM BLD-SCNC: 4.2 MMOL/L (ref 3.5–5.2)
RBC # BLD AUTO: 4.38 10*6/MM3 (ref 4.14–5.8)
SODIUM BLD-SCNC: 141 MMOL/L (ref 136–145)
WBC NRBC COR # BLD: 5.75 10*3/MM3 (ref 3.4–10.8)

## 2019-09-20 PROCEDURE — 85025 COMPLETE CBC W/AUTO DIFF WBC: CPT

## 2019-09-20 PROCEDURE — 80048 BASIC METABOLIC PNL TOTAL CA: CPT

## 2019-09-20 PROCEDURE — 36415 COLL VENOUS BLD VENIPUNCTURE: CPT

## 2019-09-24 ENCOUNTER — HOSPITAL ENCOUNTER (OUTPATIENT)
Facility: HOSPITAL | Age: 54
Setting detail: HOSPITAL OUTPATIENT SURGERY
Discharge: HOME | End: 2019-09-24
Attending: INTERNAL MEDICINE | Admitting: INTERNAL MEDICINE

## 2019-09-24 VITALS
HEIGHT: 71 IN | BODY MASS INDEX: 27.72 KG/M2 | OXYGEN SATURATION: 97 % | SYSTOLIC BLOOD PRESSURE: 111 MMHG | HEART RATE: 68 BPM | RESPIRATION RATE: 16 BRPM | WEIGHT: 198 LBS | DIASTOLIC BLOOD PRESSURE: 80 MMHG | TEMPERATURE: 98.2 F

## 2019-09-24 DIAGNOSIS — I35.0 NONRHEUMATIC AORTIC VALVE STENOSIS: ICD-10-CM

## 2019-09-24 DIAGNOSIS — I71.21 ASCENDING AORTIC ANEURYSM (HCC): ICD-10-CM

## 2019-09-24 LAB — GLUCOSE BLDC GLUCOMTR-MCNC: 122 MG/DL (ref 70–130)

## 2019-09-24 PROCEDURE — 99152 MOD SED SAME PHYS/QHP 5/>YRS: CPT | Performed by: INTERNAL MEDICINE

## 2019-09-24 PROCEDURE — 25010000002 MIDAZOLAM PER 1 MG: Performed by: INTERNAL MEDICINE

## 2019-09-24 PROCEDURE — 0 IOPAMIDOL PER 1 ML: Performed by: INTERNAL MEDICINE

## 2019-09-24 PROCEDURE — 93458 L HRT ARTERY/VENTRICLE ANGIO: CPT | Performed by: INTERNAL MEDICINE

## 2019-09-24 PROCEDURE — 25010000002 HEPARIN (PORCINE) PER 1000 UNITS: Performed by: INTERNAL MEDICINE

## 2019-09-24 PROCEDURE — 99153 MOD SED SAME PHYS/QHP EA: CPT | Performed by: INTERNAL MEDICINE

## 2019-09-24 PROCEDURE — 82962 GLUCOSE BLOOD TEST: CPT

## 2019-09-24 PROCEDURE — 25010000002 FENTANYL CITRATE (PF) 100 MCG/2ML SOLUTION: Performed by: INTERNAL MEDICINE

## 2019-09-24 PROCEDURE — C1894 INTRO/SHEATH, NON-LASER: HCPCS | Performed by: INTERNAL MEDICINE

## 2019-09-24 PROCEDURE — C1769 GUIDE WIRE: HCPCS | Performed by: INTERNAL MEDICINE

## 2019-09-24 RX ORDER — SODIUM CHLORIDE 9 MG/ML
100 INJECTION, SOLUTION INTRAVENOUS CONTINUOUS
Status: DISCONTINUED | OUTPATIENT
Start: 2019-09-24 | End: 2019-09-24 | Stop reason: HOSPADM

## 2019-09-24 RX ORDER — SODIUM CHLORIDE 0.9 % (FLUSH) 0.9 %
10 SYRINGE (ML) INJECTION AS NEEDED
Status: DISCONTINUED | OUTPATIENT
Start: 2019-09-24 | End: 2019-09-24 | Stop reason: HOSPADM

## 2019-09-24 RX ORDER — SODIUM CHLORIDE 9 MG/ML
75 INJECTION, SOLUTION INTRAVENOUS CONTINUOUS
Status: DISCONTINUED | OUTPATIENT
Start: 2019-09-24 | End: 2019-09-24 | Stop reason: HOSPADM

## 2019-09-24 RX ORDER — LIDOCAINE HYDROCHLORIDE 20 MG/ML
INJECTION, SOLUTION INFILTRATION; PERINEURAL AS NEEDED
Status: DISCONTINUED | OUTPATIENT
Start: 2019-09-24 | End: 2019-09-24 | Stop reason: HOSPADM

## 2019-09-24 RX ORDER — SODIUM CHLORIDE 0.9 % (FLUSH) 0.9 %
3 SYRINGE (ML) INJECTION EVERY 12 HOURS SCHEDULED
Status: DISCONTINUED | OUTPATIENT
Start: 2019-09-24 | End: 2019-09-24 | Stop reason: HOSPADM

## 2019-09-24 RX ORDER — NITROGLYCERIN 5 MG/ML
INJECTION, SOLUTION INTRAVENOUS AS NEEDED
Status: DISCONTINUED | OUTPATIENT
Start: 2019-09-24 | End: 2019-09-24 | Stop reason: HOSPADM

## 2019-09-24 RX ORDER — MIDAZOLAM HYDROCHLORIDE 1 MG/ML
INJECTION INTRAMUSCULAR; INTRAVENOUS AS NEEDED
Status: DISCONTINUED | OUTPATIENT
Start: 2019-09-24 | End: 2019-09-24 | Stop reason: HOSPADM

## 2019-09-24 RX ORDER — FENTANYL CITRATE 50 UG/ML
INJECTION, SOLUTION INTRAMUSCULAR; INTRAVENOUS AS NEEDED
Status: DISCONTINUED | OUTPATIENT
Start: 2019-09-24 | End: 2019-09-24 | Stop reason: HOSPADM

## 2019-09-24 RX ORDER — LIDOCAINE HYDROCHLORIDE 10 MG/ML
0.1 INJECTION, SOLUTION EPIDURAL; INFILTRATION; INTRACAUDAL; PERINEURAL ONCE AS NEEDED
Status: DISCONTINUED | OUTPATIENT
Start: 2019-09-24 | End: 2019-09-24 | Stop reason: HOSPADM

## 2019-09-24 RX ADMIN — SODIUM CHLORIDE 75 ML/HR: 9 INJECTION, SOLUTION INTRAVENOUS at 08:28

## 2019-09-24 NOTE — DISCHARGE INSTRUCTIONS
[facility]  [address] [place] [address]    Coronary Angiogram (Radial/Ulnar Approach) After Care    Refer to this sheet in the next few weeks. These instructions provide you with information on caring for yourself after your procedure. Your caregiver may also give you more specific instructions. Your treatment has been planned according to current medical practices, but problems sometimes occur. Call your caregiver if you have any problems or questions after your procedure.    Home Care Instructions:  · You may shower the day after the procedure. Remove the bandage (dressing) and gently wash the site with plain soap and water. Gently pat the site dry. You may apply a band aid daily for 2 days if desired.    · Do not apply powder or lotion to the site.  · Do not submerge the affected site in water for 3 to 5 days or until the site is completely healed.   · Do not lift, push or pull anything over 10 pounds for 2 days after your procedure.  · Inspect the site at least twice daily. You may notice some bruising at the site and it may be tender for 1 to 2 weeks.     · Increase your fluid intake for the next 2 days.    · Keep arm elevated for 24 hours. For the remainder of the day, keep your arm in “Pledge of Allegiance” position when up and about.     · You may drive 24 hours after the procedure unless otherwise instructed by your caregiver.  · Do not operate machinery or power tools for 24 hours.  · A responsible adult should be with you for the first 24 hours after you arrive home. Do not make any important legal decisions or sign legal papers for 24 hours.  Do not drink alcohol for 24 hours.    · Metformin or any medications containing Metformin should not be taken for 48 hours after your procedure.      Call Your Doctor if:   · You have unusual pain at the radial/ulnar (wrist) site.  · You have redness, warmth, swelling, or pain at the radial/ulnar (wrist) site.  · You have drainage (other  than a small amount of blood on the dressing).  · You have chills or a fever > 101.  · Your arm becomes pale or dark, cool, tingly, or numb.  · You have heavy bleeding from the site, hold pressure on the site for 20 minutes.  If the bleeding stops, apply a fresh bandage and call your cardiologist.  However, if you continue to have bleeding, call 911.              Moderate Conscious Sedation, Adult, Care After  Refer to this sheet in the next few weeks. These instructions provide you with information on caring for yourself after your procedure. Your health care provider may also give you more specific instructions. Your treatment has been planned according to current medical practices, but problems sometimes occur. Call your health care provider if you have any problems or questions after your procedure.  WHAT TO EXPECT AFTER THE PROCEDURE   After your procedure:  · You may feel sleepy, clumsy, and have poor balance for several hours.  · Vomiting may occur if you eat too soon after the procedure.  HOME CARE INSTRUCTIONS  · Do not participate in any activities where you could become injured for at least 24 hours. Do not:    Drive.    Swim.    Ride a bicycle.    Operate heavy machinery.    Cook.    Use power tools.    Climb ladders.    Work from a high place.  · Do not make important decisions or sign legal documents until you are improved.  · If you vomit, drink water, juice, or soup when you can drink without vomiting. Make sure you have little or no nausea before eating solid foods.  · Only take over-the-counter or prescription medicines for pain, discomfort, or fever as directed by your health care provider.  · Make sure you and your family fully understand everything about the medicines given to you, including what side effects may occur.  · You should not drink alcohol, take sleeping pills, or take medicines that cause drowsiness for at least 24 hours.  · If you smoke, do not smoke without supervision.  · If you  are feeling better, you may resume normal activities 24 hours after you were sedated.  · Keep all appointments with your health care provider.  · You should have a responsible adult stay with you for the first 24 hours post procedure.  SEEK MEDICAL CARE IF:  · Your skin is pale or bluish in color.  · You continue to feel nauseous or vomit.  · Your pain is getting worse and is not helped by medicine.  · You have bleeding or swelling.  · You are still sleepy or feeling clumsy after 24 hours.  SEEK IMMEDIATE MEDICAL CARE IF:  · You develop a rash.  · You have difficulty breathing.  · You develop any type of allergic problem.  · You have a fever.  MAKE SURE YOU:  · Understand these instructions.  · Will watch your condition.  · Will get help right away if you are not doing well or get worse.     This information is not intended to replace advice given to you by your health care provider. Make sure you discuss any questions you have with your health care provider.     Document Released: 10/08/2014 Document Revised: 01/08/2016 Document Reviewed: 10/08/2014  [employer] Patient Education ©2016 [employer]

## 2019-09-30 ENCOUNTER — PREP FOR SURGERY (OUTPATIENT)
Dept: OTHER | Facility: HOSPITAL | Age: 54
End: 2019-09-30

## 2019-09-30 DIAGNOSIS — I71.20 THORACIC AORTIC ANEURYSM WITHOUT RUPTURE (HCC): Primary | ICD-10-CM

## 2019-09-30 RX ORDER — CHLORHEXIDINE GLUCONATE 500 MG/1
1 CLOTH TOPICAL EVERY 12 HOURS PRN
Status: CANCELLED | OUTPATIENT
Start: 2019-09-30

## 2019-09-30 RX ORDER — CHLORHEXIDINE GLUCONATE 500 MG/1
1 CLOTH TOPICAL EVERY 12 HOURS PRN
Status: CANCELLED | OUTPATIENT
Start: 2019-10-29

## 2019-09-30 RX ORDER — CHLORHEXIDINE GLUCONATE 0.12 MG/ML
15 RINSE ORAL ONCE
Status: CANCELLED | OUTPATIENT
Start: 2019-10-29 | End: 2019-09-30

## 2019-09-30 RX ORDER — CHLORHEXIDINE GLUCONATE 0.12 MG/ML
15 RINSE ORAL EVERY 12 HOURS
Status: CANCELLED | OUTPATIENT
Start: 2019-09-30 | End: 2019-10-01

## 2019-10-01 ENCOUNTER — TRANSCRIBE ORDERS (OUTPATIENT)
Dept: RESPIRATORY THERAPY | Facility: HOSPITAL | Age: 54
End: 2019-10-01

## 2019-10-01 DIAGNOSIS — Z01.818 PRE-OP EVALUATION: Primary | ICD-10-CM

## 2019-10-02 ENCOUNTER — TELEPHONE (OUTPATIENT)
Dept: CARDIAC SURGERY | Facility: CLINIC | Age: 54
End: 2019-10-02

## 2019-10-02 NOTE — TELEPHONE ENCOUNTER
Spoke to Mr. Caraballo with his dates and times for PAT and surgery. He is to report to the Respiratory Department by 0800 on 10- with all other testing to follow.  His surgery is scheduled for 10- at 0730 with a 0500 arrival time.  He expressed a verbal understanding of this.  He was instructed to call the office with any further questions.

## 2019-10-25 ENCOUNTER — APPOINTMENT (OUTPATIENT)
Dept: PREADMISSION TESTING | Facility: HOSPITAL | Age: 54
End: 2019-10-25

## 2019-10-25 ENCOUNTER — HOSPITAL ENCOUNTER (OUTPATIENT)
Dept: GENERAL RADIOLOGY | Facility: HOSPITAL | Age: 54
Discharge: HOME | End: 2019-10-25

## 2019-10-25 ENCOUNTER — ANESTHESIA EVENT (OUTPATIENT)
Dept: PERIOP | Facility: HOSPITAL | Age: 54
End: 2019-10-25

## 2019-10-25 ENCOUNTER — HOSPITAL ENCOUNTER (OUTPATIENT)
Dept: CARDIOLOGY | Facility: HOSPITAL | Age: 54
Discharge: HOME | End: 2019-10-25
Admitting: NURSE PRACTITIONER

## 2019-10-25 ENCOUNTER — HOSPITAL ENCOUNTER (OUTPATIENT)
Dept: RESPIRATORY THERAPY | Facility: HOSPITAL | Age: 54
Discharge: HOME | End: 2019-10-25

## 2019-10-25 DIAGNOSIS — I71.20 THORACIC AORTIC ANEURYSM WITHOUT RUPTURE (HCC): ICD-10-CM

## 2019-10-25 DIAGNOSIS — Z01.818 PRE-OP EVALUATION: ICD-10-CM

## 2019-10-25 LAB
ABO GROUP BLD: NORMAL
ALBUMIN SERPL-MCNC: 4.6 G/DL (ref 3.5–5.2)
ALBUMIN/GLOB SERPL: 1.6 G/DL
ALP SERPL-CCNC: 60 U/L (ref 39–117)
ALT SERPL W P-5'-P-CCNC: 19 U/L (ref 1–41)
ANION GAP SERPL CALCULATED.3IONS-SCNC: 11 MMOL/L (ref 5–15)
APTT PPP: 26.7 SECONDS (ref 22.7–35.4)
ARTERIAL PATENCY WRIST A: POSITIVE
AST SERPL-CCNC: 15 U/L (ref 1–40)
ATMOSPHERIC PRESS: 758.2 MMHG
BASE EXCESS BLDA CALC-SCNC: -1.8 MMOL/L (ref 0–2)
BASOPHILS # BLD AUTO: 0.03 10*3/MM3 (ref 0–0.2)
BASOPHILS NFR BLD AUTO: 0.6 % (ref 0–1.5)
BDY SITE: ABNORMAL
BH CV XLRA MEAS LEFT DIST CCA EDV: -20.8 CM/SEC
BH CV XLRA MEAS LEFT DIST CCA PSV: -77.1 CM/SEC
BH CV XLRA MEAS LEFT DIST ICA EDV: -26.6 CM/SEC
BH CV XLRA MEAS LEFT DIST ICA PSV: -60.6 CM/SEC
BH CV XLRA MEAS LEFT ICA/CCA RATIO: 0.87
BH CV XLRA MEAS LEFT MID ICA EDV: -31.5 CM/SEC
BH CV XLRA MEAS LEFT MID ICA PSV: -66.2 CM/SEC
BH CV XLRA MEAS LEFT PROX CCA EDV: 24.3 CM/SEC
BH CV XLRA MEAS LEFT PROX CCA PSV: 74.5 CM/SEC
BH CV XLRA MEAS LEFT PROX ECA EDV: -17.3 CM/SEC
BH CV XLRA MEAS LEFT PROX ECA PSV: -86.6 CM/SEC
BH CV XLRA MEAS LEFT PROX ICA EDV: -23.5 CM/SEC
BH CV XLRA MEAS LEFT PROX ICA PSV: -51.8 CM/SEC
BH CV XLRA MEAS LEFT PROX SCLA EDV: 13.7 CM/SEC
BH CV XLRA MEAS LEFT PROX SCLA PSV: 122 CM/SEC
BH CV XLRA MEAS LEFT VERTEBRAL A EDV: 16.5 CM/SEC
BH CV XLRA MEAS LEFT VERTEBRAL A PSV: 42.7 CM/SEC
BH CV XLRA MEAS RIGHT DIST CCA EDV: -28.5 CM/SEC
BH CV XLRA MEAS RIGHT DIST CCA PSV: -88.5 CM/SEC
BH CV XLRA MEAS RIGHT DIST ICA EDV: -23.4 CM/SEC
BH CV XLRA MEAS RIGHT DIST ICA PSV: -69.4 CM/SEC
BH CV XLRA MEAS RIGHT ICA/CCA RATIO: 0.93
BH CV XLRA MEAS RIGHT MID ICA EDV: -33.9 CM/SEC
BH CV XLRA MEAS RIGHT MID ICA PSV: -76.2 CM/SEC
BH CV XLRA MEAS RIGHT PROX CCA EDV: 24.2 CM/SEC
BH CV XLRA MEAS RIGHT PROX CCA PSV: 67.7 CM/SEC
BH CV XLRA MEAS RIGHT PROX ECA EDV: -13.8 CM/SEC
BH CV XLRA MEAS RIGHT PROX ECA PSV: -74.2 CM/SEC
BH CV XLRA MEAS RIGHT PROX ICA EDV: -27 CM/SEC
BH CV XLRA MEAS RIGHT PROX ICA PSV: -83.1 CM/SEC
BH CV XLRA MEAS RIGHT PROX SCLA PSV: 96.4 CM/SEC
BH CV XLRA MEAS RIGHT VERTEBRAL A EDV: 15 CM/SEC
BH CV XLRA MEAS RIGHT VERTEBRAL A PSV: 40 CM/SEC
BILIRUB SERPL-MCNC: 0.7 MG/DL (ref 0.2–1.2)
BILIRUB UR QL STRIP: NEGATIVE
BLD GP AB SCN SERPL QL: NEGATIVE
BUN BLD-MCNC: 16 MG/DL (ref 6–20)
BUN/CREAT SERPL: 13.8 (ref 7–25)
CALCIUM SPEC-SCNC: 9.6 MG/DL (ref 8.6–10.5)
CHLORIDE SERPL-SCNC: 106 MMOL/L (ref 98–107)
CHOLEST SERPL-MCNC: 140 MG/DL (ref 0–200)
CLARITY UR: CLEAR
CLOSE TME COLL+ADP + EPINEP PNL BLD: 94 %
CO2 SERPL-SCNC: 26 MMOL/L (ref 22–29)
COLOR UR: YELLOW
CREAT BLD-MCNC: 1.16 MG/DL (ref 0.76–1.27)
DEPRECATED RDW RBC AUTO: 37.5 FL (ref 37–54)
EOSINOPHIL # BLD AUTO: 0.15 10*3/MM3 (ref 0–0.4)
EOSINOPHIL NFR BLD AUTO: 2.8 % (ref 0.3–6.2)
ERYTHROCYTE [DISTWIDTH] IN BLOOD BY AUTOMATED COUNT: 11.8 % (ref 12.3–15.4)
GFR SERPL CREATININE-BSD FRML MDRD: 66 ML/MIN/1.73
GLOBULIN UR ELPH-MCNC: 2.9 GM/DL
GLUCOSE BLD-MCNC: 107 MG/DL (ref 65–99)
GLUCOSE UR STRIP-MCNC: NEGATIVE MG/DL
HBA1C MFR BLD: 5.4 % (ref 4.8–5.6)
HCO3 BLDA-SCNC: 22.1 MMOL/L (ref 22–28)
HCT VFR BLD AUTO: 39.5 % (ref 37.5–51)
HDLC SERPL-MCNC: 38 MG/DL (ref 40–60)
HGB BLD-MCNC: 13.9 G/DL (ref 13–17.7)
HGB UR QL STRIP.AUTO: NEGATIVE
IMM GRANULOCYTES # BLD AUTO: 0.01 10*3/MM3 (ref 0–0.05)
IMM GRANULOCYTES NFR BLD AUTO: 0.2 % (ref 0–0.5)
INR PPP: 0.93 (ref 0.9–1.1)
KETONES UR QL STRIP: NEGATIVE
LDLC SERPL CALC-MCNC: 71 MG/DL (ref 0–100)
LDLC/HDLC SERPL: 1.87 {RATIO}
LEFT ARM BP: NORMAL MMHG
LEUKOCYTE ESTERASE UR QL STRIP.AUTO: NEGATIVE
LYMPHOCYTES # BLD AUTO: 1.43 10*3/MM3 (ref 0.7–3.1)
LYMPHOCYTES NFR BLD AUTO: 27 % (ref 19.6–45.3)
MAGNESIUM SERPL-MCNC: 2.1 MG/DL (ref 1.6–2.6)
MCH RBC QN AUTO: 30.9 PG (ref 26.6–33)
MCHC RBC AUTO-ENTMCNC: 35.2 G/DL (ref 31.5–35.7)
MCV RBC AUTO: 87.8 FL (ref 79–97)
MODALITY: ABNORMAL
MONOCYTES # BLD AUTO: 0.54 10*3/MM3 (ref 0.1–0.9)
MONOCYTES NFR BLD AUTO: 10.2 % (ref 5–12)
NEUTROPHILS # BLD AUTO: 3.14 10*3/MM3 (ref 1.7–7)
NEUTROPHILS NFR BLD AUTO: 59.2 % (ref 42.7–76)
NITRITE UR QL STRIP: NEGATIVE
NRBC BLD AUTO-RTO: 0 /100 WBC (ref 0–0.2)
NT-PROBNP SERPL-MCNC: 22 PG/ML (ref 5–900)
PCO2 BLDA: 34.5 MM HG (ref 35–45)
PH BLDA: 7.42 PH UNITS (ref 7.35–7.45)
PH UR STRIP.AUTO: 5.5 [PH] (ref 5–8)
PLATELET # BLD AUTO: 248 10*3/MM3 (ref 140–450)
PMV BLD AUTO: 9.5 FL (ref 6–12)
PO2 BLDA: 102.5 MM HG (ref 80–100)
POTASSIUM BLD-SCNC: 4.8 MMOL/L (ref 3.5–5.2)
PROT SERPL-MCNC: 7.5 G/DL (ref 6–8.5)
PROT UR QL STRIP: NEGATIVE
PROTHROMBIN TIME: 12.2 SECONDS (ref 11.7–14.2)
RBC # BLD AUTO: 4.5 10*6/MM3 (ref 4.14–5.8)
RH BLD: NEGATIVE
RIGHT ARM BP: NORMAL MMHG
SAO2 % BLDCOA: 98.1 % (ref 92–99)
SET MECH RESP RATE: 20
SODIUM BLD-SCNC: 143 MMOL/L (ref 136–145)
SP GR UR STRIP: 1.02 (ref 1–1.03)
T&S EXPIRATION DATE: NORMAL
TRIGL SERPL-MCNC: 154 MG/DL (ref 0–150)
UROBILINOGEN UR QL STRIP: NORMAL
VLDLC SERPL-MCNC: 30.8 MG/DL (ref 5–40)
WBC NRBC COR # BLD: 5.3 10*3/MM3 (ref 3.4–10.8)

## 2019-10-25 PROCEDURE — 93880 EXTRACRANIAL BILAT STUDY: CPT

## 2019-10-25 PROCEDURE — 85576 BLOOD PLATELET AGGREGATION: CPT | Performed by: NURSE PRACTITIONER

## 2019-10-25 PROCEDURE — 85730 THROMBOPLASTIN TIME PARTIAL: CPT | Performed by: NURSE PRACTITIONER

## 2019-10-25 PROCEDURE — 85025 COMPLETE CBC W/AUTO DIFF WBC: CPT | Performed by: NURSE PRACTITIONER

## 2019-10-25 PROCEDURE — 85610 PROTHROMBIN TIME: CPT | Performed by: NURSE PRACTITIONER

## 2019-10-25 PROCEDURE — 94799 UNLISTED PULMONARY SVC/PX: CPT

## 2019-10-25 PROCEDURE — 80061 LIPID PANEL: CPT | Performed by: NURSE PRACTITIONER

## 2019-10-25 PROCEDURE — 83735 ASSAY OF MAGNESIUM: CPT | Performed by: NURSE PRACTITIONER

## 2019-10-25 PROCEDURE — 71046 X-RAY EXAM CHEST 2 VIEWS: CPT

## 2019-10-25 PROCEDURE — 83036 HEMOGLOBIN GLYCOSYLATED A1C: CPT | Performed by: NURSE PRACTITIONER

## 2019-10-25 PROCEDURE — 83880 ASSAY OF NATRIURETIC PEPTIDE: CPT | Performed by: NURSE PRACTITIONER

## 2019-10-25 PROCEDURE — 86850 RBC ANTIBODY SCREEN: CPT | Performed by: NURSE PRACTITIONER

## 2019-10-25 PROCEDURE — 86900 BLOOD TYPING SEROLOGIC ABO: CPT | Performed by: NURSE PRACTITIONER

## 2019-10-25 PROCEDURE — 86901 BLOOD TYPING SEROLOGIC RH(D): CPT | Performed by: NURSE PRACTITIONER

## 2019-10-25 PROCEDURE — 94010 BREATHING CAPACITY TEST: CPT

## 2019-10-25 PROCEDURE — 80053 COMPREHEN METABOLIC PANEL: CPT | Performed by: NURSE PRACTITIONER

## 2019-10-25 PROCEDURE — 86920 COMPATIBILITY TEST SPIN: CPT

## 2019-10-25 PROCEDURE — 36415 COLL VENOUS BLD VENIPUNCTURE: CPT

## 2019-10-25 PROCEDURE — 36600 WITHDRAWAL OF ARTERIAL BLOOD: CPT

## 2019-10-25 PROCEDURE — 93005 ELECTROCARDIOGRAM TRACING: CPT

## 2019-10-25 PROCEDURE — 82803 BLOOD GASES ANY COMBINATION: CPT

## 2019-10-25 PROCEDURE — S0260 H&P FOR SURGERY: HCPCS | Performed by: THORACIC SURGERY (CARDIOTHORACIC VASCULAR SURGERY)

## 2019-10-25 PROCEDURE — 93010 ELECTROCARDIOGRAM REPORT: CPT | Performed by: INTERNAL MEDICINE

## 2019-10-25 PROCEDURE — 81003 URINALYSIS AUTO W/O SCOPE: CPT | Performed by: NURSE PRACTITIONER

## 2019-10-25 RX ORDER — CHLORHEXIDINE GLUCONATE 0.12 MG/ML
15 RINSE ORAL EVERY 12 HOURS
Status: DISPENSED | OUTPATIENT
Start: 2019-10-25 | End: 2019-10-26

## 2019-10-25 RX ORDER — CHLORHEXIDINE GLUCONATE 0.12 MG/ML
15 RINSE ORAL
COMMUNITY
End: 2019-11-03 | Stop reason: HOSPADM

## 2019-10-25 NOTE — DISCHARGE INSTRUCTIONS
SURGERY 10-29-19  ARRIVAL TIME 5:00    Take the following medications the morning of surgery with a small sip of water:    NONE        General Instructions:  • Do not eat solid food after midnight the night before surgery.  • You may drink clear liquids day of surgery but must stop at least one hour before your hospital arrival time.  • It is beneficial for you to have a clear drink that contains carbohydrates the day of surgery.  We suggest a 12 to 20 ounce bottle of Gatorade or Powerade for non-diabetic patients or a 12 to 20 ounce bottle of G2 or Powerade Zero for diabetic patients. (Pediatric patients, are not advised to drink a 12 to 20 ounce carbohydrate drink)    Clear liquids are liquids you can see through.  Nothing red in color.     Plain water                               Sports drinks  Sodas                                   Gelatin (Jell-O)  Fruit juices without pulp such as white grape juice and apple juice  Popsicles that contain no fruit or yogurt  Tea or coffee (no cream or milk added)  Gatorade / Powerade  G2 / Powerade Zero    • Infants may have breast milk up to four hours before surgery.  • Infants drinking formula may drink formula up to six hours before surgery.   • Patients who avoid smoking, chewing tobacco and alcohol for 4 weeks prior to surgery have a reduced risk of post-operative complications.  Quit smoking as many days before surgery as you can.  • Do not smoke, use chewing tobacco or drink alcohol the day of surgery.   • If applicable bring your C-PAP/ BI-PAP machine.  • Bring any papers given to you in the doctor’s office.  • Wear clean comfortable clothes.  • Do not wear contact lenses, false eyelashes or make-up.  Bring a case for your glasses.   • Bring crutches or walker if applicable.  • Remove all piercings.  Leave jewelry and any other valuables at home.  • Hair extensions with metal clips must be removed prior to surgery.  • The Pre-Admission Testing nurse will instruct you  to bring medications if unable to obtain an accurate list in Pre-Admission Testing.        If you were given a blood bank ID arm band remember to bring it with you the day of surgery.    Preventing a Surgical Site Infection:  • For 2 to 3 days before surgery, avoid shaving with a razor because the razor can irritate skin and make it easier to develop an infection.    • Any areas of open skin can increase the risk of a post-operative wound infection by allowing bacteria to enter and travel throughout the body.  Notify your surgeon if you have any skin wounds / rashes even if it is not near the expected surgical site.  The area will need assessed to determine if surgery should be delayed until it is healed.  • The night prior to surgery sleep in a clean bed with clean clothing.  Do not allow pets to sleep with you.  • Shower on the morning of surgery using a fresh bar of anti-bacterial soap (such as Dial) and clean washcloth.  Dry with a clean towel and dress in clean clothing.  • Ask your surgeon if you will be receiving antibiotics prior to surgery.  • Make sure you, your family, and all healthcare providers clean their hands with soap and water or an alcohol based hand  before caring for you or your wound.    Day of surgery:  Your arrival time is approximately two hours before your scheduled surgery time.  Upon arrival, a Pre-op nurse and Anesthesiologist will review your health history, obtain vital signs, and answer questions you may have.  The only belongings needed at this time will be a list of your home medications and if applicable your C-PAP/BI-PAP machine.  If you are staying overnight your family can leave the rest of your belongings in the car and bring them to your room later.  A Pre-op nurse will start an IV and you may receive medication in preparation for surgery, including something to help you relax.  Your family will be able to see you in the Pre-op area.  Two visitors at a time will be  allowed in the Pre-op room.  While you are in surgery your family should notify the waiting room  if they leave the waiting room area and provide a contact phone number.    Please be aware that surgery does come with discomfort.  We want to make every effort to control your discomfort so please discuss any uncontrolled symptoms with your nurse.   Your doctor will most likely have prescribed pain medications.      If you are going home after surgery you will receive individualized written care instructions before being discharged.  A responsible adult must drive you to and from the hospital on the day of your surgery and stay with you for 24 hours.    If you are staying overnight following surgery, you will be transported to your hospital room following the recovery period.  [facility] has all private rooms.    You have received a list of surgical assistants for your reference.  If you have any questions please call Pre-Admission Testing at [phone].  Deductibles and co-payments are collected on the day of service. Please be prepared to pay the required co-pay, deductible or deposit on the day of service as defined by your plan.    2% CHLORAHEXIDINE GLUCONATE* CLOTH  Preparing or “prepping” skin before surgery can reduce the risk of infection at the surgical site. To make the process easier, [facility] has chosen disposable cloths moistened with a rinse-free, 2% Chlorhexidine Gluconate (CHG) antiseptic solution. The steps below outline the prepping process and should be carefully followed.        Use the prep cloth on the area that is circled in the diagram             Directions Night before Surgery  1) Shower using a fresh bar of anti-bacterial soap (such as Dial) and clean washcloth.  Use a clean towel to completely dry your skin.  2) Do not use any lotions, oils or creams on your skin.  3) Open the package and remove 1 cloth, wipe your skin for 30 seconds in a circular  motion.  Allow to dry for 3 minutes.  4) Repeat #3 with second cloth.  5) Do not touch your eyes, ears, or mouth with the prep cloth.  6) Allow the wet prep solution to air dry.  7) Discard the prep cloth and wash your hands with soap and water.   8) Dress in clean bed clothes and sleep on fresh clean bed sheets.   9) You may experience some temporary itching after the prep.    Directions Day of Surgery  1) Repeat steps 1,2,3,4,5,6,7, and 9.   2) Dress in clean clothes before coming to the hospital.    BACTROBAN NASAL OINTMENT  There are many germs normally in your nose. Bactroban is an ointment that will help reduce these germs. Please follow these instructions for Bactroban use:      ____The day before surgery in the morning  Date________    ____The day before surgery in the evening              Date________    ____The day of surgery in the morning    Date________    **Squirt ½ package of Bactroban Ointment onto a cotton applicator and apply to inside of 1st nostril.  Squirt the remaining Bactroban and apply to the inside of the other nostril.    PERIDEX- ORAL:  Use only if your surgeon has ordered  Use the night before and morning of surgery - Swish, gargle, and spit - do not swallow.USE AS DIRECTED

## 2019-10-25 NOTE — ANESTHESIA PREPROCEDURE EVALUATION
Anesthesia Evaluation     NPO Solid Status: > 8 hours             Airway   Mallampati: III  TM distance: >3 FB  Neck ROM: full  No difficulty expected  Dental    (+) upper dentures    Pulmonary - normal exam   (+) sleep apnea,   Cardiovascular - normal exam    Patient on routine beta blocker and Beta blocker given within 24 hours of surgery    (+) hypertension less than 2 medications, valvular problems/murmurs AS, hyperlipidemia,   (-) past MI      Neuro/Psych  (-) CVA  GI/Hepatic/Renal/Endo    (+) obesity,   diabetes mellitus type 2,     Musculoskeletal     Abdominal    Substance History      OB/GYN          Other   (+) arthritis                     Anesthesia Plan    ASA 4     general     intravenous induction   Anesthetic plan, all risks, benefits, and alternatives have been provided, discussed and informed consent has been obtained with: patient.

## 2019-10-29 ENCOUNTER — ANESTHESIA (OUTPATIENT)
Dept: PERIOP | Facility: HOSPITAL | Age: 54
End: 2019-10-29

## 2019-10-29 ENCOUNTER — HOSPITAL ENCOUNTER (INPATIENT)
Facility: HOSPITAL | Age: 54
LOS: 5 days | Discharge: HOME HEALTH | End: 2019-11-03
Attending: THORACIC SURGERY (CARDIOTHORACIC VASCULAR SURGERY) | Admitting: THORACIC SURGERY (CARDIOTHORACIC VASCULAR SURGERY)

## 2019-10-29 ENCOUNTER — APPOINTMENT (OUTPATIENT)
Dept: GENERAL RADIOLOGY | Facility: HOSPITAL | Age: 54
End: 2019-10-29

## 2019-10-29 ENCOUNTER — ANCILLARY PROCEDURE (OUTPATIENT)
Dept: PERIOP | Facility: HOSPITAL | Age: 54
End: 2019-10-29

## 2019-10-29 DIAGNOSIS — Z86.79 S/P ASCENDING AORTIC ANEURYSM REPAIR: ICD-10-CM

## 2019-10-29 DIAGNOSIS — Z95.2 S/P AVR (AORTIC VALVE REPLACEMENT): Primary | ICD-10-CM

## 2019-10-29 DIAGNOSIS — Z98.890 S/P VENTRICULAR SEPTAL MYECTOMY: ICD-10-CM

## 2019-10-29 DIAGNOSIS — I71.20 THORACIC AORTIC ANEURYSM WITHOUT RUPTURE (HCC): ICD-10-CM

## 2019-10-29 DIAGNOSIS — Z98.890 S/P ASCENDING AORTIC ANEURYSM REPAIR: ICD-10-CM

## 2019-10-29 LAB
ACT BLD: 125 SECONDS (ref 82–152)
ACT BLD: 345 SECONDS (ref 82–152)
ACT BLD: 367 SECONDS (ref 82–152)
ACT BLD: 422 SECONDS (ref 82–152)
ACT BLD: 604 SECONDS (ref 82–152)
ACT BLD: 98 SECONDS (ref 82–152)
ALBUMIN SERPL-MCNC: 4.3 G/DL (ref 3.5–5.2)
ALBUMIN SERPL-MCNC: 4.5 G/DL (ref 3.5–5.2)
ANION GAP SERPL CALCULATED.3IONS-SCNC: 12.8 MMOL/L (ref 5–15)
ANION GAP SERPL CALCULATED.3IONS-SCNC: 9.2 MMOL/L (ref 5–15)
APTT PPP: 25.5 SECONDS (ref 22.7–35.4)
APTT PPP: 27.7 SECONDS (ref 22.7–35.4)
ARTERIAL PATENCY WRIST A: ABNORMAL
ATMOSPHERIC PRESS: 753.6 MMHG
ATMOSPHERIC PRESS: 754.6 MMHG
ATMOSPHERIC PRESS: 755.1 MMHG
ATMOSPHERIC PRESS: 755.7 MMHG
BASE EXCESS BLDA CALC-SCNC: -0.3 MMOL/L (ref 0–2)
BASE EXCESS BLDA CALC-SCNC: -1 MMOL/L (ref -5–5)
BASE EXCESS BLDA CALC-SCNC: -5 MMOL/L (ref -5–5)
BASE EXCESS BLDA CALC-SCNC: 1 MMOL/L (ref -5–5)
BASE EXCESS BLDA CALC-SCNC: 1.5 MMOL/L (ref 0–2)
BASE EXCESS BLDA CALC-SCNC: 1.6 MMOL/L (ref 0–2)
BASE EXCESS BLDA CALC-SCNC: 1.9 MMOL/L (ref 0–2)
BASE EXCESS BLDA CALC-SCNC: 3 MMOL/L (ref -5–5)
BASE EXCESS BLDA CALC-SCNC: 4 MMOL/L (ref -5–5)
BASE EXCESS BLDA CALC-SCNC: 6 MMOL/L (ref -5–5)
BASE EXCESS BLDA CALC-SCNC: 6 MMOL/L (ref -5–5)
BASOPHILS # BLD AUTO: 0.02 10*3/MM3 (ref 0–0.2)
BASOPHILS NFR BLD AUTO: 0.2 % (ref 0–1.5)
BDY SITE: ABNORMAL
BUN BLD-MCNC: 15 MG/DL (ref 6–20)
BUN BLD-MCNC: 16 MG/DL (ref 6–20)
BUN/CREAT SERPL: 11.8 (ref 7–25)
BUN/CREAT SERPL: 12.4 (ref 7–25)
CA-I BLD-MCNC: 4.9 MG/DL (ref 4.6–5.4)
CA-I SERPL ISE-MCNC: 1.22 MMOL/L (ref 1.15–1.35)
CALCIUM SPEC-SCNC: 7.7 MG/DL (ref 8.6–10.5)
CALCIUM SPEC-SCNC: 7.7 MG/DL (ref 8.6–10.5)
CHLORIDE SERPL-SCNC: 108 MMOL/L (ref 98–107)
CHLORIDE SERPL-SCNC: 112 MMOL/L (ref 98–107)
CO2 BLDA-SCNC: 23 MMOL/L (ref 24–29)
CO2 BLDA-SCNC: 29 MMOL/L (ref 24–29)
CO2 BLDA-SCNC: 31 MMOL/L (ref 24–29)
CO2 BLDA-SCNC: 32 MMOL/L (ref 24–29)
CO2 BLDA-SCNC: 33 MMOL/L (ref 24–29)
CO2 BLDA-SCNC: 35 MMOL/L (ref 24–29)
CO2 BLDA-SCNC: 35 MMOL/L (ref 24–29)
CO2 SERPL-SCNC: 24.2 MMOL/L (ref 22–29)
CO2 SERPL-SCNC: 24.8 MMOL/L (ref 22–29)
CREAT BLD-MCNC: 1.27 MG/DL (ref 0.76–1.27)
CREAT BLD-MCNC: 1.29 MG/DL (ref 0.76–1.27)
DEPRECATED RDW RBC AUTO: 40.5 FL (ref 37–54)
DEPRECATED RDW RBC AUTO: 40.8 FL (ref 37–54)
DEPRECATED RDW RBC AUTO: 41 FL (ref 37–54)
EOSINOPHIL # BLD AUTO: 0.04 10*3/MM3 (ref 0–0.4)
EOSINOPHIL NFR BLD AUTO: 0.4 % (ref 0.3–6.2)
ERYTHROCYTE [DISTWIDTH] IN BLOOD BY AUTOMATED COUNT: 12.2 % (ref 12.3–15.4)
ERYTHROCYTE [DISTWIDTH] IN BLOOD BY AUTOMATED COUNT: 12.2 % (ref 12.3–15.4)
ERYTHROCYTE [DISTWIDTH] IN BLOOD BY AUTOMATED COUNT: 12.5 % (ref 12.3–15.4)
FIBRINOGEN PPP-MCNC: 176 MG/DL (ref 219–464)
FIBRINOGEN PPP-MCNC: 189 MG/DL (ref 219–464)
GFR SERPL CREATININE-BSD FRML MDRD: 58 ML/MIN/1.73
GFR SERPL CREATININE-BSD FRML MDRD: 60 ML/MIN/1.73
GLUCOSE BLD-MCNC: 109 MG/DL (ref 65–99)
GLUCOSE BLD-MCNC: 114 MG/DL (ref 65–99)
GLUCOSE BLDC GLUCOMTR-MCNC: 100 MG/DL (ref 70–130)
GLUCOSE BLDC GLUCOMTR-MCNC: 102 MG/DL (ref 70–130)
GLUCOSE BLDC GLUCOMTR-MCNC: 105 MG/DL (ref 70–130)
GLUCOSE BLDC GLUCOMTR-MCNC: 106 MG/DL (ref 70–130)
GLUCOSE BLDC GLUCOMTR-MCNC: 109 MG/DL (ref 70–130)
GLUCOSE BLDC GLUCOMTR-MCNC: 111 MG/DL (ref 70–130)
GLUCOSE BLDC GLUCOMTR-MCNC: 111 MG/DL (ref 70–130)
GLUCOSE BLDC GLUCOMTR-MCNC: 112 MG/DL (ref 70–130)
GLUCOSE BLDC GLUCOMTR-MCNC: 113 MG/DL (ref 70–130)
GLUCOSE BLDC GLUCOMTR-MCNC: 114 MG/DL (ref 70–130)
GLUCOSE BLDC GLUCOMTR-MCNC: 122 MG/DL (ref 70–130)
GLUCOSE BLDC GLUCOMTR-MCNC: 137 MG/DL (ref 70–130)
GLUCOSE BLDC GLUCOMTR-MCNC: 149 MG/DL (ref 70–130)
GLUCOSE BLDC GLUCOMTR-MCNC: 155 MG/DL (ref 70–130)
GLUCOSE BLDC GLUCOMTR-MCNC: 161 MG/DL (ref 70–130)
GLUCOSE BLDC GLUCOMTR-MCNC: 165 MG/DL (ref 70–130)
GLUCOSE BLDC GLUCOMTR-MCNC: 170 MG/DL (ref 70–130)
HCO3 BLDA-SCNC: 21.6 MMOL/L (ref 22–26)
HCO3 BLDA-SCNC: 26.8 MMOL/L (ref 22–28)
HCO3 BLDA-SCNC: 27 MMOL/L (ref 22–28)
HCO3 BLDA-SCNC: 27 MMOL/L (ref 22–28)
HCO3 BLDA-SCNC: 27.3 MMOL/L (ref 22–26)
HCO3 BLDA-SCNC: 27.8 MMOL/L (ref 22–28)
HCO3 BLDA-SCNC: 28.6 MMOL/L (ref 22–26)
HCO3 BLDA-SCNC: 30 MMOL/L (ref 22–26)
HCO3 BLDA-SCNC: 31 MMOL/L (ref 22–26)
HCO3 BLDA-SCNC: 33 MMOL/L (ref 22–26)
HCO3 BLDA-SCNC: 33 MMOL/L (ref 22–26)
HCT VFR BLD AUTO: 30.2 % (ref 37.5–51)
HCT VFR BLD AUTO: 30.6 % (ref 37.5–51)
HCT VFR BLD AUTO: 34.6 % (ref 37.5–51)
HCT VFR BLDA CALC: 27 % (ref 38–51)
HCT VFR BLDA CALC: 28 % (ref 38–51)
HCT VFR BLDA CALC: 28 % (ref 38–51)
HCT VFR BLDA CALC: 29 % (ref 38–51)
HCT VFR BLDA CALC: 30 % (ref 38–51)
HGB BLD-MCNC: 10 G/DL (ref 13–17.7)
HGB BLD-MCNC: 10.3 G/DL (ref 13–17.7)
HGB BLD-MCNC: 11.5 G/DL (ref 13–17.7)
HGB BLDA-MCNC: 10.2 G/DL (ref 12–17)
HGB BLDA-MCNC: 9.2 G/DL (ref 12–17)
HGB BLDA-MCNC: 9.5 G/DL (ref 12–17)
HGB BLDA-MCNC: 9.5 G/DL (ref 12–17)
HGB BLDA-MCNC: 9.9 G/DL (ref 12–17)
HOROWITZ INDEX BLD+IHG-RTO: 100 %
HOROWITZ INDEX BLD+IHG-RTO: 40 %
IMM GRANULOCYTES # BLD AUTO: 0.1 10*3/MM3 (ref 0–0.05)
IMM GRANULOCYTES NFR BLD AUTO: 1 % (ref 0–0.5)
INR PPP: 1.41 (ref 0.9–1.1)
INR PPP: 1.68 (ref 0.9–1.1)
INR PPP: 2.1 (ref 0.8–1.2)
LYMPHOCYTES # BLD AUTO: 0.87 10*3/MM3 (ref 0.7–3.1)
LYMPHOCYTES NFR BLD AUTO: 8.4 % (ref 19.6–45.3)
MAGNESIUM SERPL-MCNC: 2.8 MG/DL (ref 1.6–2.6)
MAGNESIUM SERPL-MCNC: 3.5 MG/DL (ref 1.6–2.6)
MCH RBC QN AUTO: 29.9 PG (ref 26.6–33)
MCH RBC QN AUTO: 30.1 PG (ref 26.6–33)
MCH RBC QN AUTO: 30.2 PG (ref 26.6–33)
MCHC RBC AUTO-ENTMCNC: 33.1 G/DL (ref 31.5–35.7)
MCHC RBC AUTO-ENTMCNC: 33.2 G/DL (ref 31.5–35.7)
MCHC RBC AUTO-ENTMCNC: 33.7 G/DL (ref 31.5–35.7)
MCV RBC AUTO: 89.7 FL (ref 79–97)
MCV RBC AUTO: 90.1 FL (ref 79–97)
MCV RBC AUTO: 91 FL (ref 79–97)
MODALITY: ABNORMAL
MONOCYTES # BLD AUTO: 0.54 10*3/MM3 (ref 0.1–0.9)
MONOCYTES NFR BLD AUTO: 5.2 % (ref 5–12)
NEUTROPHILS # BLD AUTO: 8.76 10*3/MM3 (ref 1.7–7)
NEUTROPHILS NFR BLD AUTO: 84.8 % (ref 42.7–76)
NRBC BLD AUTO-RTO: 0 /100 WBC (ref 0–0.2)
O2 A-A PPRESDIFF RESPIRATORY: 0.3 MMHG
O2 A-A PPRESDIFF RESPIRATORY: 0.4 MMHG
O2 A-A PPRESDIFF RESPIRATORY: 0.5 MMHG
O2 A-A PPRESDIFF RESPIRATORY: 0.5 MMHG
PCO2 BLDA: 43.7 MM HG (ref 35–45)
PCO2 BLDA: 44.3 MM HG (ref 35–45)
PCO2 BLDA: 44.9 MM HG (ref 35–45)
PCO2 BLDA: 51.2 MM HG (ref 35–45)
PCO2 BLDA: 55.9 MM HG (ref 35–45)
PCO2 BLDA: 65.9 MM HG (ref 35–45)
PCO2 BLDA: 67.1 MM HG (ref 35–45)
PCO2 BLDA: 69.2 MM HG (ref 35–45)
PCO2 BLDA: 69.7 MM HG (ref 35–45)
PCO2 BLDA: 73 MM HG (ref 35–45)
PCO2 BLDA: 75.8 MM HG (ref 35–45)
PEEP RESPIRATORY: 6.5 CM[H2O]
PEEP RESPIRATORY: 7.5 CM[H2O]
PH BLDA: 7.2 PH UNITS (ref 7.35–7.6)
PH BLDA: 7.24 PH UNITS (ref 7.35–7.6)
PH BLDA: 7.25 PH UNITS (ref 7.35–7.6)
PH BLDA: 7.26 PH UNITS (ref 7.35–7.6)
PH BLDA: 7.29 PH UNITS (ref 7.35–7.45)
PH BLDA: 7.29 PH UNITS (ref 7.35–7.6)
PH BLDA: 7.34 PH UNITS (ref 7.35–7.45)
PH BLDA: 7.39 PH UNITS (ref 7.35–7.45)
PH BLDA: 7.4 PH UNITS (ref 7.35–7.45)
PHOSPHATE SERPL-MCNC: 1 MG/DL (ref 2.5–4.5)
PHOSPHATE SERPL-MCNC: 1.5 MG/DL (ref 2.5–4.5)
PLATELET # BLD AUTO: 134 10*3/MM3 (ref 140–450)
PLATELET # BLD AUTO: 149 10*3/MM3 (ref 140–450)
PLATELET # BLD AUTO: 184 10*3/MM3 (ref 140–450)
PMV BLD AUTO: 9.2 FL (ref 6–12)
PMV BLD AUTO: 9.2 FL (ref 6–12)
PMV BLD AUTO: 9.5 FL (ref 6–12)
PO2 BLDA: 133.1 MM HG (ref 80–100)
PO2 BLDA: 133.3 MM HG (ref 80–100)
PO2 BLDA: 221.2 MM HG (ref 80–100)
PO2 BLDA: 302 MMHG (ref 80–105)
PO2 BLDA: 385 MMHG (ref 80–105)
PO2 BLDA: 398 MMHG (ref 80–105)
PO2 BLDA: 449 MMHG (ref 80–105)
PO2 BLDA: 470 MMHG (ref 80–105)
PO2 BLDA: 49 MMHG (ref 80–105)
PO2 BLDA: 81 MMHG (ref 80–105)
PO2 BLDA: 85.4 MM HG (ref 80–100)
POTASSIUM BLD-SCNC: 3.8 MMOL/L (ref 3.5–5.2)
POTASSIUM BLD-SCNC: 4 MMOL/L (ref 3.5–5.2)
POTASSIUM BLD-SCNC: 4 MMOL/L (ref 3.5–5.2)
POTASSIUM BLDA-SCNC: 3.7 MMOL/L (ref 3.5–4.9)
POTASSIUM BLDA-SCNC: 4.4 MMOL/L (ref 3.5–4.9)
POTASSIUM BLDA-SCNC: 4.5 MMOL/L (ref 3.5–4.9)
POTASSIUM BLDA-SCNC: 4.7 MMOL/L (ref 3.5–4.9)
POTASSIUM BLDA-SCNC: 5 MMOL/L (ref 3.5–4.9)
POTASSIUM BLDA-SCNC: 5.5 MMOL/L (ref 3.5–4.9)
POTASSIUM BLDA-SCNC: 6 MMOL/L (ref 3.5–4.9)
PROTHROMBIN TIME: 16.9 SECONDS (ref 11.7–14.2)
PROTHROMBIN TIME: 19.5 SECONDS (ref 11.7–14.2)
PROTHROMBIN TIME: 24 SECONDS (ref 12.8–15.2)
PSV: 8 CMH2O
RBC # BLD AUTO: 3.32 10*6/MM3 (ref 4.14–5.8)
RBC # BLD AUTO: 3.41 10*6/MM3 (ref 4.14–5.8)
RBC # BLD AUTO: 3.84 10*6/MM3 (ref 4.14–5.8)
SAO2 % BLDA: 100 % (ref 95–98)
SAO2 % BLDA: 80 % (ref 95–98)
SAO2 % BLDA: 92 % (ref 95–98)
SAO2 % BLDCOA: 95.7 % (ref 92–99)
SAO2 % BLDCOA: 99 % (ref 92–99)
SAO2 % BLDCOA: 99 % (ref 92–99)
SAO2 % BLDCOA: 99.7 % (ref 92–99)
SET MECH RESP RATE: 14
SET MECH RESP RATE: 16
SET MECH RESP RATE: 17
SODIUM BLD-SCNC: 145 MMOL/L (ref 136–145)
SODIUM BLD-SCNC: 146 MMOL/L (ref 136–145)
TOTAL RATE: 14 BREATHS/MINUTE
TOTAL RATE: 16 BREATHS/MINUTE
TOTAL RATE: 17 BREATHS/MINUTE
TOTAL RATE: 17 BREATHS/MINUTE
VENTILATOR MODE: ABNORMAL
VENTILATOR MODE: AC
VT ON VENT VENT: 600 ML
VT ON VENT VENT: 650 ML
VT ON VENT VENT: 650 ML
VT ON VENT VENT: 666 ML
WBC NRBC COR # BLD: 10.33 10*3/MM3 (ref 3.4–10.8)
WBC NRBC COR # BLD: 12.11 10*3/MM3 (ref 3.4–10.8)
WBC NRBC COR # BLD: 8.44 10*3/MM3 (ref 3.4–10.8)

## 2019-10-29 PROCEDURE — 5A1221Z PERFORMANCE OF CARDIAC OUTPUT, CONTINUOUS: ICD-10-PCS | Performed by: THORACIC SURGERY (CARDIOTHORACIC VASCULAR SURGERY)

## 2019-10-29 PROCEDURE — C1751 CATH, INF, PER/CENT/MIDLINE: HCPCS | Performed by: ANESTHESIOLOGY

## 2019-10-29 PROCEDURE — 25010000002 PROTAMINE SULFATE PER 10 MG: Performed by: ANESTHESIOLOGY

## 2019-10-29 PROCEDURE — 82330 ASSAY OF CALCIUM: CPT | Performed by: THORACIC SURGERY (CARDIOTHORACIC VASCULAR SURGERY)

## 2019-10-29 PROCEDURE — 93010 ELECTROCARDIOGRAM REPORT: CPT | Performed by: INTERNAL MEDICINE

## 2019-10-29 PROCEDURE — 82803 BLOOD GASES ANY COMBINATION: CPT

## 2019-10-29 PROCEDURE — 71045 X-RAY EXAM CHEST 1 VIEW: CPT

## 2019-10-29 PROCEDURE — 25010000002 MORPHINE PER 10 MG: Performed by: THORACIC SURGERY (CARDIOTHORACIC VASCULAR SURGERY)

## 2019-10-29 PROCEDURE — 85027 COMPLETE CBC AUTOMATED: CPT | Performed by: THORACIC SURGERY (CARDIOTHORACIC VASCULAR SURGERY)

## 2019-10-29 PROCEDURE — 25010000002 HEPARIN (PORCINE) PER 1000 UNITS

## 2019-10-29 PROCEDURE — 33405 REPLACEMENT AORTIC VALVE OPN: CPT | Performed by: THORACIC SURGERY (CARDIOTHORACIC VASCULAR SURGERY)

## 2019-10-29 PROCEDURE — 25010000002 PROPOFOL 10 MG/ML EMULSION: Performed by: ANESTHESIOLOGY

## 2019-10-29 PROCEDURE — 85025 COMPLETE CBC W/AUTO DIFF WBC: CPT | Performed by: THORACIC SURGERY (CARDIOTHORACIC VASCULAR SURGERY)

## 2019-10-29 PROCEDURE — 94799 UNLISTED PULMONARY SVC/PX: CPT

## 2019-10-29 PROCEDURE — 33416 REVISE VENTRICLE MUSCLE: CPT | Performed by: PHYSICIAN ASSISTANT

## 2019-10-29 PROCEDURE — 93005 ELECTROCARDIOGRAM TRACING: CPT | Performed by: THORACIC SURGERY (CARDIOTHORACIC VASCULAR SURGERY)

## 2019-10-29 PROCEDURE — C1713 ANCHOR/SCREW BN/BN,TIS/BN: HCPCS | Performed by: THORACIC SURGERY (CARDIOTHORACIC VASCULAR SURGERY)

## 2019-10-29 PROCEDURE — 82947 ASSAY GLUCOSE BLOOD QUANT: CPT

## 2019-10-29 PROCEDURE — 25010000002 ONDANSETRON PER 1 MG: Performed by: ANESTHESIOLOGY

## 2019-10-29 PROCEDURE — 25010000002 ALBUMIN HUMAN 25% PER 50 ML

## 2019-10-29 PROCEDURE — B24BZZ4 ULTRASONOGRAPHY OF HEART WITH AORTA, TRANSESOPHAGEAL: ICD-10-PCS | Performed by: ANESTHESIOLOGY

## 2019-10-29 PROCEDURE — 88307 TISSUE EXAM BY PATHOLOGIST: CPT | Performed by: THORACIC SURGERY (CARDIOTHORACIC VASCULAR SURGERY)

## 2019-10-29 PROCEDURE — 85610 PROTHROMBIN TIME: CPT | Performed by: THORACIC SURGERY (CARDIOTHORACIC VASCULAR SURGERY)

## 2019-10-29 PROCEDURE — 33405 REPLACEMENT AORTIC VALVE OPN: CPT | Performed by: PHYSICIAN ASSISTANT

## 2019-10-29 PROCEDURE — 02RF08Z REPLACEMENT OF AORTIC VALVE WITH ZOOPLASTIC TISSUE, OPEN APPROACH: ICD-10-PCS | Performed by: THORACIC SURGERY (CARDIOTHORACIC VASCULAR SURGERY)

## 2019-10-29 PROCEDURE — 33866 AORTIC HEMIARCH GRAFT: CPT | Performed by: THORACIC SURGERY (CARDIOTHORACIC VASCULAR SURGERY)

## 2019-10-29 PROCEDURE — 85730 THROMBOPLASTIN TIME PARTIAL: CPT | Performed by: THORACIC SURGERY (CARDIOTHORACIC VASCULAR SURGERY)

## 2019-10-29 PROCEDURE — 33866 AORTIC HEMIARCH GRAFT: CPT | Performed by: PHYSICIAN ASSISTANT

## 2019-10-29 PROCEDURE — 94002 VENT MGMT INPAT INIT DAY: CPT

## 2019-10-29 PROCEDURE — 85384 FIBRINOGEN ACTIVITY: CPT | Performed by: THORACIC SURGERY (CARDIOTHORACIC VASCULAR SURGERY)

## 2019-10-29 PROCEDURE — 25010000002 ALBUMIN HUMAN 5% PER 50 ML: Performed by: THORACIC SURGERY (CARDIOTHORACIC VASCULAR SURGERY)

## 2019-10-29 PROCEDURE — 25010000003 CEFAZOLIN IN DEXTROSE 2-4 GM/100ML-% SOLUTION: Performed by: THORACIC SURGERY (CARDIOTHORACIC VASCULAR SURGERY)

## 2019-10-29 PROCEDURE — 25010000002 MAGNESIUM SULFATE PER 500 MG OF MAGNESIUM: Performed by: ANESTHESIOLOGY

## 2019-10-29 PROCEDURE — 25010000002 MIDAZOLAM PER 1 MG: Performed by: ANESTHESIOLOGY

## 2019-10-29 PROCEDURE — 33416 REVISE VENTRICLE MUSCLE: CPT | Performed by: THORACIC SURGERY (CARDIOTHORACIC VASCULAR SURGERY)

## 2019-10-29 PROCEDURE — 02RX0JZ REPLACEMENT OF THORACIC AORTA, ASCENDING/ARCH WITH SYNTHETIC SUBSTITUTE, OPEN APPROACH: ICD-10-PCS | Performed by: THORACIC SURGERY (CARDIOTHORACIC VASCULAR SURGERY)

## 2019-10-29 PROCEDURE — C1768 GRAFT, VASCULAR: HCPCS | Performed by: THORACIC SURGERY (CARDIOTHORACIC VASCULAR SURGERY)

## 2019-10-29 PROCEDURE — 85347 COAGULATION TIME ACTIVATED: CPT

## 2019-10-29 PROCEDURE — 93318 ECHO TRANSESOPHAGEAL INTRAOP: CPT

## 2019-10-29 PROCEDURE — 027L0ZZ DILATION OF LEFT VENTRICLE, OPEN APPROACH: ICD-10-PCS | Performed by: THORACIC SURGERY (CARDIOTHORACIC VASCULAR SURGERY)

## 2019-10-29 PROCEDURE — 33860 PR ASCEND AORTA GRAFT INCL VAVLE SUSPENSION: CPT | Performed by: THORACIC SURGERY (CARDIOTHORACIC VASCULAR SURGERY)

## 2019-10-29 PROCEDURE — 85018 HEMOGLOBIN: CPT

## 2019-10-29 PROCEDURE — 25010000003 CEFAZOLIN PER 500 MG: Performed by: NURSE PRACTITIONER

## 2019-10-29 PROCEDURE — 33860 PR ASCEND AORTA GRAFT INCL VAVLE SUSPENSION: CPT | Performed by: PHYSICIAN ASSISTANT

## 2019-10-29 PROCEDURE — 83735 ASSAY OF MAGNESIUM: CPT | Performed by: THORACIC SURGERY (CARDIOTHORACIC VASCULAR SURGERY)

## 2019-10-29 PROCEDURE — 85014 HEMATOCRIT: CPT

## 2019-10-29 PROCEDURE — 86900 BLOOD TYPING SEROLOGIC ABO: CPT

## 2019-10-29 PROCEDURE — C1729 CATH, DRAINAGE: HCPCS | Performed by: THORACIC SURGERY (CARDIOTHORACIC VASCULAR SURGERY)

## 2019-10-29 PROCEDURE — 25010000002 PROPOFOL 10 MG/ML EMULSION: Performed by: THORACIC SURGERY (CARDIOTHORACIC VASCULAR SURGERY)

## 2019-10-29 PROCEDURE — 63710000001 INSULIN REGULAR HUMAN PER 5 UNITS: Performed by: ANESTHESIOLOGY

## 2019-10-29 PROCEDURE — 25010000003 POTASSIUM CHLORIDE PER 2 MEQ: Performed by: THORACIC SURGERY (CARDIOTHORACIC VASCULAR SURGERY)

## 2019-10-29 PROCEDURE — 88305 TISSUE EXAM BY PATHOLOGIST: CPT | Performed by: THORACIC SURGERY (CARDIOTHORACIC VASCULAR SURGERY)

## 2019-10-29 PROCEDURE — 80069 RENAL FUNCTION PANEL: CPT | Performed by: THORACIC SURGERY (CARDIOTHORACIC VASCULAR SURGERY)

## 2019-10-29 PROCEDURE — 25010000002 FENTANYL CITRATE (PF) 100 MCG/2ML SOLUTION: Performed by: ANESTHESIOLOGY

## 2019-10-29 PROCEDURE — 86901 BLOOD TYPING SEROLOGIC RH(D): CPT

## 2019-10-29 PROCEDURE — 84132 ASSAY OF SERUM POTASSIUM: CPT | Performed by: THORACIC SURGERY (CARDIOTHORACIC VASCULAR SURGERY)

## 2019-10-29 PROCEDURE — P9041 ALBUMIN (HUMAN),5%, 50ML: HCPCS | Performed by: THORACIC SURGERY (CARDIOTHORACIC VASCULAR SURGERY)

## 2019-10-29 PROCEDURE — 25010000002 FOSPHENYTOIN 100 MG PE/2ML SOLUTION 2 ML VIAL: Performed by: ANESTHESIOLOGY

## 2019-10-29 PROCEDURE — 25010000002 METOCLOPRAMIDE PER 10 MG: Performed by: THORACIC SURGERY (CARDIOTHORACIC VASCULAR SURGERY)

## 2019-10-29 PROCEDURE — 25010000002 METHYLPREDNISOLONE PER 125 MG: Performed by: ANESTHESIOLOGY

## 2019-10-29 PROCEDURE — P9047 ALBUMIN (HUMAN), 25%, 50ML: HCPCS

## 2019-10-29 PROCEDURE — 85610 PROTHROMBIN TIME: CPT

## 2019-10-29 PROCEDURE — 82962 GLUCOSE BLOOD TEST: CPT

## 2019-10-29 PROCEDURE — 25010000002 HEPARIN (PORCINE) PER 1000 UNITS: Performed by: ANESTHESIOLOGY

## 2019-10-29 PROCEDURE — 4A10X4G MONITORING OF CENTRAL NERVOUS ELECTRICAL ACTIVITY, INTRAOPERATIVE, EXTERNAL APPROACH: ICD-10-PCS | Performed by: THORACIC SURGERY (CARDIOTHORACIC VASCULAR SURGERY)

## 2019-10-29 DEVICE — VLV PERICARD PERIMOUNT MAGNA EASE 25: Type: IMPLANTABLE DEVICE | Site: HEART | Status: FUNCTIONAL

## 2019-10-29 DEVICE — SS SUTURE, 3 PER SLEEVE
Type: IMPLANTABLE DEVICE | Site: STERNUM | Status: FUNCTIONAL
Brand: MYO/WIRE II

## 2019-10-29 DEVICE — SS SUTURE, 4 PER SLEEVE
Type: IMPLANTABLE DEVICE | Site: STERNUM | Status: FUNCTIONAL
Brand: MYO/WIRE II

## 2019-10-29 DEVICE — COR-KNOT MINI® COMBO KITBASE PACKAGE TYPE - KITEACH STERILE PACKAGE KIT CONTAINS (2) SINGLE PATIENT USE COR-KNOT MINI® DEVICES AND (12) COR-KNOT® QUICK LOADS®.
Type: IMPLANTABLE DEVICE | Site: HEART | Status: FUNCTIONAL
Brand: COR-KNOT MINI®

## 2019-10-29 DEVICE — GELWEAVE GELATIN IMPREGNATED WOVEN VASCULAR PROSTHESIS STRAIGHT
Type: IMPLANTABLE DEVICE | Site: HEART | Status: FUNCTIONAL
Brand: GELWEAVE™

## 2019-10-29 RX ORDER — SODIUM CHLORIDE 0.9 % (FLUSH) 0.9 %
30 SYRINGE (ML) INJECTION ONCE AS NEEDED
Status: DISCONTINUED | OUTPATIENT
Start: 2019-10-29 | End: 2019-11-03 | Stop reason: HOSPADM

## 2019-10-29 RX ORDER — SODIUM CHLORIDE, SODIUM LACTATE, POTASSIUM CHLORIDE, CALCIUM CHLORIDE 600; 310; 30; 20 MG/100ML; MG/100ML; MG/100ML; MG/100ML
9 INJECTION, SOLUTION INTRAVENOUS CONTINUOUS
Status: DISCONTINUED | OUTPATIENT
Start: 2019-10-29 | End: 2019-10-29

## 2019-10-29 RX ORDER — SODIUM CHLORIDE 0.9 % (FLUSH) 0.9 %
3-10 SYRINGE (ML) INJECTION AS NEEDED
Status: DISCONTINUED | OUTPATIENT
Start: 2019-10-29 | End: 2019-10-29 | Stop reason: HOSPADM

## 2019-10-29 RX ORDER — SODIUM CHLORIDE 0.9 % (FLUSH) 0.9 %
3 SYRINGE (ML) INJECTION EVERY 12 HOURS SCHEDULED
Status: DISCONTINUED | OUTPATIENT
Start: 2019-10-29 | End: 2019-10-29 | Stop reason: HOSPADM

## 2019-10-29 RX ORDER — ACETAMINOPHEN 325 MG/1
650 TABLET ORAL EVERY 4 HOURS PRN
Status: DISCONTINUED | OUTPATIENT
Start: 2019-10-30 | End: 2019-11-03 | Stop reason: HOSPADM

## 2019-10-29 RX ORDER — FAMOTIDINE 10 MG/ML
20 INJECTION, SOLUTION INTRAVENOUS
Status: COMPLETED | OUTPATIENT
Start: 2019-10-29 | End: 2019-10-29

## 2019-10-29 RX ORDER — AMINOCAPROIC ACID 250 MG/ML
INJECTION, SOLUTION INTRAVENOUS AS NEEDED
Status: DISCONTINUED | OUTPATIENT
Start: 2019-10-29 | End: 2019-10-29 | Stop reason: SURG

## 2019-10-29 RX ORDER — ACETAMINOPHEN 325 MG/1
650 TABLET ORAL EVERY 4 HOURS
Status: DISCONTINUED | OUTPATIENT
Start: 2019-10-29 | End: 2019-10-30

## 2019-10-29 RX ORDER — POTASSIUM CHLORIDE 7.45 MG/ML
10 INJECTION INTRAVENOUS
Status: DISCONTINUED | OUTPATIENT
Start: 2019-10-29 | End: 2019-11-03 | Stop reason: HOSPADM

## 2019-10-29 RX ORDER — BISACODYL 5 MG/1
10 TABLET, DELAYED RELEASE ORAL DAILY PRN
Status: DISCONTINUED | OUTPATIENT
Start: 2019-10-29 | End: 2019-11-03 | Stop reason: HOSPADM

## 2019-10-29 RX ORDER — HYDROCODONE BITARTRATE AND ACETAMINOPHEN 5; 325 MG/1; MG/1
2 TABLET ORAL EVERY 4 HOURS PRN
Status: DISCONTINUED | OUTPATIENT
Start: 2019-10-29 | End: 2019-11-03 | Stop reason: HOSPADM

## 2019-10-29 RX ORDER — ONDANSETRON 2 MG/ML
INJECTION INTRAMUSCULAR; INTRAVENOUS AS NEEDED
Status: DISCONTINUED | OUTPATIENT
Start: 2019-10-29 | End: 2019-10-29 | Stop reason: SURG

## 2019-10-29 RX ORDER — GABAPENTIN 300 MG/1
600 CAPSULE ORAL
Status: COMPLETED | OUTPATIENT
Start: 2019-10-29 | End: 2019-10-29

## 2019-10-29 RX ORDER — SODIUM CHLORIDE 9 MG/ML
INJECTION, SOLUTION INTRAVENOUS CONTINUOUS PRN
Status: DISCONTINUED | OUTPATIENT
Start: 2019-10-29 | End: 2019-10-29 | Stop reason: SURG

## 2019-10-29 RX ORDER — OXYCODONE HYDROCHLORIDE 5 MG/1
10 TABLET ORAL EVERY 4 HOURS PRN
Status: DISCONTINUED | OUTPATIENT
Start: 2019-10-29 | End: 2019-11-03 | Stop reason: HOSPADM

## 2019-10-29 RX ORDER — MAGNESIUM SULFATE 1 G/100ML
1 INJECTION INTRAVENOUS EVERY 8 HOURS
Status: DISCONTINUED | OUTPATIENT
Start: 2019-10-29 | End: 2019-10-30

## 2019-10-29 RX ORDER — POTASSIUM CHLORIDE 1.5 G/1.77G
40 POWDER, FOR SOLUTION ORAL AS NEEDED
Status: DISCONTINUED | OUTPATIENT
Start: 2019-10-29 | End: 2019-11-03 | Stop reason: HOSPADM

## 2019-10-29 RX ORDER — BISACODYL 10 MG
10 SUPPOSITORY, RECTAL RECTAL DAILY PRN
Status: DISCONTINUED | OUTPATIENT
Start: 2019-10-30 | End: 2019-11-03 | Stop reason: HOSPADM

## 2019-10-29 RX ORDER — ATORVASTATIN CALCIUM 20 MG/1
40 TABLET, FILM COATED ORAL NIGHTLY
Status: DISCONTINUED | OUTPATIENT
Start: 2019-10-29 | End: 2019-10-31

## 2019-10-29 RX ORDER — NALOXONE HCL 0.4 MG/ML
0.4 VIAL (ML) INJECTION
Status: DISCONTINUED | OUTPATIENT
Start: 2019-10-29 | End: 2019-11-03 | Stop reason: HOSPADM

## 2019-10-29 RX ORDER — MAGNESIUM SULFATE HEPTAHYDRATE 500 MG/ML
INJECTION, SOLUTION INTRAMUSCULAR; INTRAVENOUS AS NEEDED
Status: DISCONTINUED | OUTPATIENT
Start: 2019-10-29 | End: 2019-10-29 | Stop reason: SURG

## 2019-10-29 RX ORDER — PROTAMINE SULFATE 10 MG/ML
INJECTION, SOLUTION INTRAVENOUS AS NEEDED
Status: DISCONTINUED | OUTPATIENT
Start: 2019-10-29 | End: 2019-10-29 | Stop reason: SURG

## 2019-10-29 RX ORDER — POTASSIUM CHLORIDE 29.8 MG/ML
20 INJECTION INTRAVENOUS
Status: COMPLETED | OUTPATIENT
Start: 2019-10-29 | End: 2019-10-29

## 2019-10-29 RX ORDER — ONDANSETRON 2 MG/ML
4 INJECTION INTRAMUSCULAR; INTRAVENOUS EVERY 6 HOURS PRN
Status: DISCONTINUED | OUTPATIENT
Start: 2019-10-29 | End: 2019-11-03 | Stop reason: HOSPADM

## 2019-10-29 RX ORDER — CHLORHEXIDINE GLUCONATE 0.12 MG/ML
15 RINSE ORAL ONCE
Status: DISCONTINUED | OUTPATIENT
Start: 2019-10-29 | End: 2019-10-29 | Stop reason: HOSPADM

## 2019-10-29 RX ORDER — SENNA AND DOCUSATE SODIUM 50; 8.6 MG/1; MG/1
2 TABLET, FILM COATED ORAL NIGHTLY
Status: DISCONTINUED | OUTPATIENT
Start: 2019-10-30 | End: 2019-11-03 | Stop reason: HOSPADM

## 2019-10-29 RX ORDER — ROCURONIUM BROMIDE 10 MG/ML
INJECTION, SOLUTION INTRAVENOUS AS NEEDED
Status: DISCONTINUED | OUTPATIENT
Start: 2019-10-29 | End: 2019-10-29 | Stop reason: SURG

## 2019-10-29 RX ORDER — ACETAMINOPHEN 650 MG/1
650 SUPPOSITORY RECTAL EVERY 4 HOURS PRN
Status: DISCONTINUED | OUTPATIENT
Start: 2019-10-30 | End: 2019-11-03 | Stop reason: HOSPADM

## 2019-10-29 RX ORDER — MIDAZOLAM HYDROCHLORIDE 1 MG/ML
2 INJECTION INTRAMUSCULAR; INTRAVENOUS
Status: DISCONTINUED | OUTPATIENT
Start: 2019-10-29 | End: 2019-10-29 | Stop reason: HOSPADM

## 2019-10-29 RX ORDER — METHYLPREDNISOLONE SODIUM SUCCINATE 125 MG/2ML
INJECTION, POWDER, LYOPHILIZED, FOR SOLUTION INTRAMUSCULAR; INTRAVENOUS AS NEEDED
Status: DISCONTINUED | OUTPATIENT
Start: 2019-10-29 | End: 2019-10-29 | Stop reason: SURG

## 2019-10-29 RX ORDER — MORPHINE SULFATE 2 MG/ML
4 INJECTION, SOLUTION INTRAMUSCULAR; INTRAVENOUS
Status: DISCONTINUED | OUTPATIENT
Start: 2019-10-29 | End: 2019-11-03 | Stop reason: HOSPADM

## 2019-10-29 RX ORDER — PANTOPRAZOLE SODIUM 40 MG/1
40 TABLET, DELAYED RELEASE ORAL
Status: DISCONTINUED | OUTPATIENT
Start: 2019-10-30 | End: 2019-11-03 | Stop reason: HOSPADM

## 2019-10-29 RX ORDER — CEFAZOLIN SODIUM 2 G/100ML
2 INJECTION, SOLUTION INTRAVENOUS EVERY 8 HOURS
Status: COMPLETED | OUTPATIENT
Start: 2019-10-29 | End: 2019-10-31

## 2019-10-29 RX ORDER — ALBUMIN, HUMAN INJ 5% 5 %
1500 SOLUTION INTRAVENOUS AS NEEDED
Status: DISPENSED | OUTPATIENT
Start: 2019-10-29 | End: 2019-10-30

## 2019-10-29 RX ORDER — ACETAMINOPHEN 500 MG
1000 TABLET ORAL
Status: COMPLETED | OUTPATIENT
Start: 2019-10-29 | End: 2019-10-29

## 2019-10-29 RX ORDER — POTASSIUM CHLORIDE 29.8 MG/ML
20 INJECTION INTRAVENOUS
Status: COMPLETED | OUTPATIENT
Start: 2019-10-29 | End: 2019-10-30

## 2019-10-29 RX ORDER — MORPHINE SULFATE 2 MG/ML
1 INJECTION, SOLUTION INTRAMUSCULAR; INTRAVENOUS EVERY 4 HOURS PRN
Status: DISCONTINUED | OUTPATIENT
Start: 2019-10-29 | End: 2019-11-03 | Stop reason: HOSPADM

## 2019-10-29 RX ORDER — HEPARIN SODIUM 1000 [USP'U]/ML
INJECTION, SOLUTION INTRAVENOUS; SUBCUTANEOUS AS NEEDED
Status: DISCONTINUED | OUTPATIENT
Start: 2019-10-29 | End: 2019-10-29 | Stop reason: SURG

## 2019-10-29 RX ORDER — DOPAMINE HYDROCHLORIDE 160 MG/100ML
2-20 INJECTION, SOLUTION INTRAVENOUS CONTINUOUS PRN
Status: DISCONTINUED | OUTPATIENT
Start: 2019-10-29 | End: 2019-10-30

## 2019-10-29 RX ORDER — MEPERIDINE HYDROCHLORIDE 25 MG/ML
25 INJECTION INTRAMUSCULAR; INTRAVENOUS; SUBCUTANEOUS EVERY 4 HOURS PRN
Status: ACTIVE | OUTPATIENT
Start: 2019-10-29 | End: 2019-10-29

## 2019-10-29 RX ORDER — MILRINONE LACTATE 0.2 MG/ML
.25-.75 INJECTION, SOLUTION INTRAVENOUS CONTINUOUS PRN
Status: DISCONTINUED | OUTPATIENT
Start: 2019-10-29 | End: 2019-10-30

## 2019-10-29 RX ORDER — FAMOTIDINE 10 MG/ML
20 INJECTION, SOLUTION INTRAVENOUS ONCE
Status: DISCONTINUED | OUTPATIENT
Start: 2019-10-29 | End: 2019-10-29 | Stop reason: HOSPADM

## 2019-10-29 RX ORDER — FUROSEMIDE 10 MG/ML
40 INJECTION INTRAMUSCULAR; INTRAVENOUS EVERY 6 HOURS PRN
Status: DISCONTINUED | OUTPATIENT
Start: 2019-10-29 | End: 2019-10-30

## 2019-10-29 RX ORDER — CHLORHEXIDINE GLUCONATE 0.12 MG/ML
15 RINSE ORAL EVERY 12 HOURS
Status: DISCONTINUED | OUTPATIENT
Start: 2019-10-29 | End: 2019-11-01

## 2019-10-29 RX ORDER — PROPOFOL 10 MG/ML
VIAL (ML) INTRAVENOUS AS NEEDED
Status: DISCONTINUED | OUTPATIENT
Start: 2019-10-29 | End: 2019-10-29 | Stop reason: SURG

## 2019-10-29 RX ORDER — NOREPINEPHRINE BIT/0.9 % NACL 8 MG/250ML
.02-.3 INFUSION BOTTLE (ML) INTRAVENOUS CONTINUOUS PRN
Status: DISCONTINUED | OUTPATIENT
Start: 2019-10-29 | End: 2019-10-30

## 2019-10-29 RX ORDER — ALPRAZOLAM 0.25 MG/1
0.25 TABLET ORAL EVERY 8 HOURS PRN
Status: DISCONTINUED | OUTPATIENT
Start: 2019-10-29 | End: 2019-10-31

## 2019-10-29 RX ORDER — MIDAZOLAM HYDROCHLORIDE 1 MG/ML
1 INJECTION INTRAMUSCULAR; INTRAVENOUS
Status: DISCONTINUED | OUTPATIENT
Start: 2019-10-29 | End: 2019-10-29 | Stop reason: HOSPADM

## 2019-10-29 RX ORDER — CYCLOBENZAPRINE HCL 10 MG
10 TABLET ORAL EVERY 8 HOURS PRN
Status: DISCONTINUED | OUTPATIENT
Start: 2019-10-30 | End: 2019-11-03 | Stop reason: HOSPADM

## 2019-10-29 RX ORDER — PROPOFOL 10 MG/ML
VIAL (ML) INTRAVENOUS CONTINUOUS PRN
Status: DISCONTINUED | OUTPATIENT
Start: 2019-10-29 | End: 2019-10-29 | Stop reason: SURG

## 2019-10-29 RX ORDER — ACETAMINOPHEN 160 MG/5ML
650 SOLUTION ORAL EVERY 4 HOURS PRN
Status: DISCONTINUED | OUTPATIENT
Start: 2019-10-30 | End: 2019-11-03 | Stop reason: HOSPADM

## 2019-10-29 RX ORDER — KETAMINE HYDROCHLORIDE 10 MG/ML
INJECTION INTRAMUSCULAR; INTRAVENOUS AS NEEDED
Status: DISCONTINUED | OUTPATIENT
Start: 2019-10-29 | End: 2019-10-29 | Stop reason: SURG

## 2019-10-29 RX ORDER — NOREPINEPHRINE BITARTRATE 1 MG/ML
INJECTION, SOLUTION INTRAVENOUS CONTINUOUS PRN
Status: DISCONTINUED | OUTPATIENT
Start: 2019-10-29 | End: 2019-10-29 | Stop reason: SURG

## 2019-10-29 RX ORDER — SODIUM CHLORIDE 9 MG/ML
30 INJECTION, SOLUTION INTRAVENOUS CONTINUOUS PRN
Status: DISCONTINUED | OUTPATIENT
Start: 2019-10-29 | End: 2019-11-03 | Stop reason: HOSPADM

## 2019-10-29 RX ORDER — MIDAZOLAM HYDROCHLORIDE 1 MG/ML
2 INJECTION INTRAMUSCULAR; INTRAVENOUS
Status: DISCONTINUED | OUTPATIENT
Start: 2019-10-29 | End: 2019-10-30

## 2019-10-29 RX ORDER — POTASSIUM CHLORIDE 750 MG/1
40 CAPSULE, EXTENDED RELEASE ORAL AS NEEDED
Status: DISCONTINUED | OUTPATIENT
Start: 2019-10-29 | End: 2019-11-03 | Stop reason: HOSPADM

## 2019-10-29 RX ORDER — NITROGLYCERIN 20 MG/100ML
5-200 INJECTION INTRAVENOUS
Status: DISCONTINUED | OUTPATIENT
Start: 2019-10-29 | End: 2019-10-30

## 2019-10-29 RX ORDER — ACETAMINOPHEN 160 MG/5ML
650 SOLUTION ORAL EVERY 4 HOURS
Status: DISCONTINUED | OUTPATIENT
Start: 2019-10-29 | End: 2019-10-30

## 2019-10-29 RX ORDER — POTASSIUM CHLORIDE 29.8 MG/ML
20 INJECTION INTRAVENOUS
Status: DISCONTINUED | OUTPATIENT
Start: 2019-10-29 | End: 2019-11-03 | Stop reason: HOSPADM

## 2019-10-29 RX ORDER — ASPIRIN 81 MG/1
81 TABLET ORAL DAILY
Status: DISCONTINUED | OUTPATIENT
Start: 2019-10-30 | End: 2019-11-03 | Stop reason: HOSPADM

## 2019-10-29 RX ORDER — ACETAMINOPHEN 650 MG/1
650 SUPPOSITORY RECTAL EVERY 4 HOURS
Status: DISCONTINUED | OUTPATIENT
Start: 2019-10-29 | End: 2019-10-30

## 2019-10-29 RX ORDER — PROMETHAZINE HYDROCHLORIDE 25 MG/1
12.5 TABLET ORAL EVERY 6 HOURS PRN
Status: DISCONTINUED | OUTPATIENT
Start: 2019-10-29 | End: 2019-11-03 | Stop reason: HOSPADM

## 2019-10-29 RX ORDER — PROMETHAZINE HYDROCHLORIDE 25 MG/ML
12.5 INJECTION, SOLUTION INTRAMUSCULAR; INTRAVENOUS EVERY 6 HOURS PRN
Status: DISCONTINUED | OUTPATIENT
Start: 2019-10-29 | End: 2019-11-03 | Stop reason: HOSPADM

## 2019-10-29 RX ORDER — SODIUM CHLORIDE 9 MG/ML
30 INJECTION, SOLUTION INTRAVENOUS CONTINUOUS
Status: DISCONTINUED | OUTPATIENT
Start: 2019-10-29 | End: 2019-11-03 | Stop reason: HOSPADM

## 2019-10-29 RX ORDER — FENTANYL CITRATE 50 UG/ML
INJECTION, SOLUTION INTRAMUSCULAR; INTRAVENOUS AS NEEDED
Status: DISCONTINUED | OUTPATIENT
Start: 2019-10-29 | End: 2019-10-29 | Stop reason: SURG

## 2019-10-29 RX ORDER — METOCLOPRAMIDE HYDROCHLORIDE 5 MG/ML
10 INJECTION INTRAMUSCULAR; INTRAVENOUS EVERY 6 HOURS
Status: DISCONTINUED | OUTPATIENT
Start: 2019-10-29 | End: 2019-10-30

## 2019-10-29 RX ORDER — LIDOCAINE HYDROCHLORIDE 10 MG/ML
0.5 INJECTION, SOLUTION EPIDURAL; INFILTRATION; INTRACAUDAL; PERINEURAL ONCE AS NEEDED
Status: DISCONTINUED | OUTPATIENT
Start: 2019-10-29 | End: 2019-10-29 | Stop reason: HOSPADM

## 2019-10-29 RX ORDER — CHLORHEXIDINE GLUCONATE 500 MG/1
1 CLOTH TOPICAL EVERY 12 HOURS PRN
Status: DISCONTINUED | OUTPATIENT
Start: 2019-10-29 | End: 2019-10-29 | Stop reason: HOSPADM

## 2019-10-29 RX ORDER — FENTANYL CITRATE 50 UG/ML
50 INJECTION, SOLUTION INTRAMUSCULAR; INTRAVENOUS
Status: DISCONTINUED | OUTPATIENT
Start: 2019-10-29 | End: 2019-10-29 | Stop reason: HOSPADM

## 2019-10-29 RX ADMIN — POTASSIUM CHLORIDE 20 MEQ: 2 INJECTION, SOLUTION, CONCENTRATE INTRAVENOUS at 15:55

## 2019-10-29 RX ADMIN — PROPOFOL 30 MCG/KG/MIN: 10 INJECTION, EMULSION INTRAVENOUS at 13:23

## 2019-10-29 RX ADMIN — METOCLOPRAMIDE 10 MG: 5 INJECTION, SOLUTION INTRAMUSCULAR; INTRAVENOUS at 18:22

## 2019-10-29 RX ADMIN — POTASSIUM CHLORIDE 20 MEQ: 29.8 INJECTION, SOLUTION INTRAVENOUS at 13:15

## 2019-10-29 RX ADMIN — SODIUM CHLORIDE 2.5 MG/HR: 9 INJECTION, SOLUTION INTRAVENOUS at 10:23

## 2019-10-29 RX ADMIN — KETAMINE HYDROCHLORIDE 50 MG: 10 INJECTION INTRAMUSCULAR; INTRAVENOUS at 08:19

## 2019-10-29 RX ADMIN — CEFAZOLIN 2 G: 1 INJECTION, POWDER, FOR SOLUTION INTRAMUSCULAR; INTRAVENOUS at 07:23

## 2019-10-29 RX ADMIN — MIDAZOLAM 2 MG: 1 INJECTION INTRAMUSCULAR; INTRAVENOUS at 06:04

## 2019-10-29 RX ADMIN — PROPOFOL 20 MG: 10 INJECTION, EMULSION INTRAVENOUS at 06:45

## 2019-10-29 RX ADMIN — FENTANYL CITRATE 75 MCG: 50 INJECTION, SOLUTION INTRAMUSCULAR; INTRAVENOUS at 06:56

## 2019-10-29 RX ADMIN — CEFAZOLIN SODIUM 2 G: 2 INJECTION, SOLUTION INTRAVENOUS at 17:50

## 2019-10-29 RX ADMIN — KETAMINE HYDROCHLORIDE 50 MG: 10 INJECTION INTRAMUSCULAR; INTRAVENOUS at 07:39

## 2019-10-29 RX ADMIN — FOSPHENYTOIN SODIUM 500 MG PE: 50 INJECTION, SOLUTION INTRAMUSCULAR; INTRAVENOUS at 08:12

## 2019-10-29 RX ADMIN — DEXMEDETOMIDINE HYDROCHLORIDE 0.5 MCG/KG/HR: 100 INJECTION, SOLUTION, CONCENTRATE INTRAVENOUS at 14:47

## 2019-10-29 RX ADMIN — SODIUM CHLORIDE: 900 INJECTION, SOLUTION INTRAVENOUS at 08:20

## 2019-10-29 RX ADMIN — HEPARIN SODIUM 30000 UNITS: 1000 INJECTION, SOLUTION INTRAVENOUS; SUBCUTANEOUS at 08:02

## 2019-10-29 RX ADMIN — ACETAMINOPHEN ORAL SOLUTION 650 MG: 325 SOLUTION ORAL at 20:19

## 2019-10-29 RX ADMIN — MORPHINE SULFATE 1 MG: 2 INJECTION, SOLUTION INTRAMUSCULAR; INTRAVENOUS at 18:34

## 2019-10-29 RX ADMIN — NOREPINEPHRINE BITARTRATE 0.01 MCG/KG/MIN: 1 INJECTION, SOLUTION, CONCENTRATE INTRAVENOUS at 09:16

## 2019-10-29 RX ADMIN — SODIUM CHLORIDE 30 ML/HR: 9 INJECTION, SOLUTION INTRAVENOUS at 11:53

## 2019-10-29 RX ADMIN — FAMOTIDINE 20 MG: 10 INJECTION INTRAVENOUS at 06:04

## 2019-10-29 RX ADMIN — PROPOFOL 100 MG: 10 INJECTION, EMULSION INTRAVENOUS at 08:41

## 2019-10-29 RX ADMIN — AMINOCAPROIC ACID 10 G: 250 INJECTION, SOLUTION INTRAVENOUS at 07:33

## 2019-10-29 RX ADMIN — PROPOFOL 60 MG: 10 INJECTION, EMULSION INTRAVENOUS at 06:56

## 2019-10-29 RX ADMIN — MAGNESIUM SULFATE HEPTAHYDRATE 1 G: 500 INJECTION, SOLUTION INTRAMUSCULAR; INTRAVENOUS at 06:45

## 2019-10-29 RX ADMIN — KETAMINE HYDROCHLORIDE 25 MG: 10 INJECTION INTRAMUSCULAR; INTRAVENOUS at 06:52

## 2019-10-29 RX ADMIN — OXYCODONE HYDROCHLORIDE 10 MG: 5 TABLET ORAL at 17:49

## 2019-10-29 RX ADMIN — KETAMINE HYDROCHLORIDE 50 MG: 10 INJECTION INTRAMUSCULAR; INTRAVENOUS at 10:09

## 2019-10-29 RX ADMIN — ROCURONIUM BROMIDE 10 MG: 10 INJECTION INTRAVENOUS at 10:39

## 2019-10-29 RX ADMIN — ONDANSETRON 4 MG: 2 INJECTION INTRAMUSCULAR; INTRAVENOUS at 10:56

## 2019-10-29 RX ADMIN — MAGNESIUM SULFATE HEPTAHYDRATE 2 G: 500 INJECTION, SOLUTION INTRAMUSCULAR; INTRAVENOUS at 09:27

## 2019-10-29 RX ADMIN — FENTANYL CITRATE 175 MCG: 50 INJECTION, SOLUTION INTRAMUSCULAR; INTRAVENOUS at 07:43

## 2019-10-29 RX ADMIN — MUPIROCIN 1 APPLICATION: 20 OINTMENT TOPICAL at 21:37

## 2019-10-29 RX ADMIN — SODIUM CHLORIDE 2.4 MCG/KG/HR: 900 INJECTION, SOLUTION INTRAVENOUS at 06:45

## 2019-10-29 RX ADMIN — ACETAMINOPHEN 1000 MG: 500 TABLET, FILM COATED ORAL at 06:02

## 2019-10-29 RX ADMIN — SODIUM CHLORIDE 2 UNITS/HR: 900 INJECTION, SOLUTION INTRAVENOUS at 08:38

## 2019-10-29 RX ADMIN — ALBUMIN HUMAN 250 ML: 0.05 INJECTION, SOLUTION INTRAVENOUS at 12:10

## 2019-10-29 RX ADMIN — ROCURONIUM BROMIDE 40 MG: 10 INJECTION INTRAVENOUS at 06:56

## 2019-10-29 RX ADMIN — ALBUMIN HUMAN 250 ML: 0.05 INJECTION, SOLUTION INTRAVENOUS at 21:38

## 2019-10-29 RX ADMIN — AMINOCAPROIC ACID 10 G: 250 INJECTION, SOLUTION INTRAVENOUS at 10:26

## 2019-10-29 RX ADMIN — SODIUM CHLORIDE: 900 INJECTION, SOLUTION INTRAVENOUS at 06:42

## 2019-10-29 RX ADMIN — KETAMINE HYDROCHLORIDE 25 MG: 10 INJECTION INTRAMUSCULAR; INTRAVENOUS at 06:56

## 2019-10-29 RX ADMIN — SODIUM CHLORIDE: 9 INJECTION, SOLUTION INTRAVENOUS at 08:07

## 2019-10-29 RX ADMIN — ATORVASTATIN CALCIUM 40 MG: 20 TABLET, FILM COATED ORAL at 20:19

## 2019-10-29 RX ADMIN — ROCURONIUM BROMIDE 20 MG: 10 INJECTION INTRAVENOUS at 07:43

## 2019-10-29 RX ADMIN — GABAPENTIN 600 MG: 300 CAPSULE ORAL at 06:02

## 2019-10-29 RX ADMIN — CEFAZOLIN 2 G: 1 INJECTION, POWDER, FOR SOLUTION INTRAMUSCULAR; INTRAVENOUS at 10:26

## 2019-10-29 RX ADMIN — ALBUMIN HUMAN 250 ML: 0.05 INJECTION, SOLUTION INTRAVENOUS at 13:19

## 2019-10-29 RX ADMIN — MAGNESIUM SULFATE HEPTAHYDRATE 1 G: 500 INJECTION, SOLUTION INTRAMUSCULAR; INTRAVENOUS at 06:56

## 2019-10-29 RX ADMIN — METHYLPREDNISOLONE SODIUM SUCCINATE 250 MG: 125 INJECTION, POWDER, FOR SOLUTION INTRAMUSCULAR; INTRAVENOUS at 08:13

## 2019-10-29 RX ADMIN — PROTAMINE SULFATE 350 MG: 10 INJECTION, SOLUTION INTRAVENOUS at 10:14

## 2019-10-29 RX ADMIN — OXYCODONE HYDROCHLORIDE 10 MG: 5 TABLET ORAL at 22:07

## 2019-10-29 RX ADMIN — PROPOFOL 20 MG: 10 INJECTION, EMULSION INTRAVENOUS at 06:51

## 2019-10-29 RX ADMIN — SODIUM CHLORIDE: 9 INJECTION, SOLUTION INTRAVENOUS at 08:25

## 2019-10-29 RX ADMIN — ALBUMIN HUMAN 250 ML: 0.05 INJECTION, SOLUTION INTRAVENOUS at 15:05

## 2019-10-29 RX ADMIN — PROPOFOL 25 MCG/KG/MIN: 10 INJECTION, EMULSION INTRAVENOUS at 08:18

## 2019-10-29 RX ADMIN — MORPHINE SULFATE 4 MG: 2 INJECTION, SOLUTION INTRAMUSCULAR; INTRAVENOUS at 14:41

## 2019-10-29 NOTE — ANESTHESIA POSTPROCEDURE EVALUATION
"Patient: Brigido Caraballo    Procedure Summary     Date:  10/29/19 Room / Location:  [facility] OR  / [facility] MAIN OR    Anesthesia Start:  0642 Anesthesia Stop:  1146    Procedure:  INTRAOPERATIVE RICHARD; STERNOTOMY THORACIC AORTIC ANEURYSM ASCENDING ARCH REPAIR WITH CIRCULATORY ARREST; AORTIC VALVE REPLACEMENT AND PRP. (N/A Chest) Diagnosis:       Thoracic aortic aneurysm without rupture (CMS/HCC)      (Thoracic aortic aneurysm without rupture (CMS/HCC) [I71.2])    Surgeon:  Felipe Shell MD Provider:  Truman Huizar MD    Anesthesia Type:  general ASA Status:  4          Anesthesia Type: general  Last vitals  BP   109/64 (10/29/19 1900)   Temp   36.8 °C (98.2 °F) (10/29/19 0547)   Pulse   89 (10/29/19 1900)   Resp   12 (10/29/19 1900)     SpO2   98 % (10/29/19 1900)     Post Anesthesia Care and Evaluation    Patient location during evaluation: bedside  Level of consciousness: awake  Pain management: adequate  Airway patency: patent  Anesthetic complications: No anesthetic complications    Cardiovascular status: acceptable  Respiratory status: acceptable  Hydration status: acceptable    Comments: /64 (BP Location: Left arm, Patient Position: Lying)   Pulse 89   Temp 36.8 °C (98.2 °F) (Oral)   Resp 12   Ht 177.8 cm (70\")   Wt 93.7 kg (206 lb 9 oz)   SpO2 98%   BMI 29.64 kg/m²         "

## 2019-10-29 NOTE — ANESTHESIA PROCEDURE NOTES
Airway  Date/Time: 10/29/2019 7:00 AM  Airway not difficult    General Information and Staff    Anesthesiologist: Truman Huizar MD    Indications and Patient Condition    Preoxygenated: yes  Mask difficulty assessment: 1 - vent by mask    Final Airway Details  Final airway type: endotracheal airway      Successful airway: ETT  Cuffed: yes   Successful intubation technique: direct laryngoscopy  Endotracheal tube insertion site: oral  Blade: Sanders  Blade size: 2  ETT size (mm): 8.0  Cormack-Lehane Classification: grade I - full view of glottis  Placement verified by: chest auscultation and capnometry   Measured from: gums  ETT/EBT to gums (cm): 21  ETT/EBT  to teeth (cm): 22  Assessment: lips, teeth, and gum same as pre-op and atraumatic intubation

## 2019-10-29 NOTE — ANESTHESIA PROCEDURE NOTES
Procedure Performed: Emergent/Open-Heart Anesthesia RICHARD     Start Time:        End Time:      Preanesthesia Checklist:  Procedure being performed at surgeon's request.    General Procedure Information  Diagnostic Indications for Echo:  assessment of surgical repair and hemodynamic monitoring  Physician Requesting Echo: Felipe Shell MD  CPT Code:  Aortic stenosis. ascending aortic aneurysm, mitral regurgitation  Location performed:  OR  Intubated  Bite block not placed  Heart visualized  Probe Insertion:  Easy  Probe Type:  Multiplane  Modalities:  Color flow mapping, continuous wave Doppler and pulse wave Doppler    Echocardiographic and Doppler Measurements    Ventricles    Right Ventricle:  Cavity size normal.  Thrombus not present.  Global function normal.    Left Ventricle:  Cavity size normal.  Hypertrophy present.  Thrombus not present.  Global Function normal.  Ejection Fraction 70%.      Ventricular Regional Function:  1- Basal Anteroseptal:  normal  2- Basal Anterior:  normal  3- Basal Anterolateral:  normal  4- Basal Inferolateral:  normal  5- Basal Inferior:  normal  6- Basal Inferoseptal:  normal  7- Mid Anteroseptal:  normal  8- Mid Anterior:  normal  9- Mid Anterolateral:  normal  10- Mid Inferolateral:  normal  11- Mid Inferior:  normal  12- Mid Inferoseptal:  normal  13- Apical Anterior:  normal  14- Apical Lateral:  normal  15- Apical Inferior:  normal  16- Apical Septal:  normal  17- [place]:  normal      Valves    Aortic Valve:  Annulus calcified.  Stenosis moderate.  Area: .89 cm².  Mean Gradient: 18 mmHg.  Regurgitation absent.  Leaflets bicuspid.  Leaflet motions restricted.      Mitral Valve:  Annulus normal.  Stenosis not present.  Area: 2.25 cm².  Mean Gradient: 1 mmHg.  Regurgitation mild.  Leaflets normal.  Leaflet motions normal.      Tricuspid Valve:  Annulus normal.  Stenosis not present.  Regurgitation absent.  Leaflets normal.  Leaflet motions normal.          Aorta    Ascending  Aorta:  Size aneurysmal.  Diameter 4.2 cm.  Dissection not present.          Atria    Right Atrium:  Size normal.  Spontaneous echo contrast not present.    Left Atrium:  Size normal.  Spontaneous echo contrast not present.  Left atrial appendage normal.      Septa    Atrial Septum:  Intra-atrial septal morphology normal.      Ventricular Septum:  Intra-ventricular septum morphology hypertrophy.      Diastolic Function Measurements:  Diastolic Dysfunction Grade= II  E= 65.7 ms  A= 53 ms  E/A Ratio= 1.2  DT=  ms  S/D=   IVRT=        Anesthesia Information  Performed Personally  Anesthesiologist:  Truman Huizar MD      Echocardiogram Comments:       Lateral e' 6.85, E/e' 9.6  Aortic valve dimensionless index 0.3    S/p bioprosthetic AVR, Ascending aortic and arch aneurysm repair with DHCA, septal myomectomy  Normal function bioprosthetic AVR in place, leaflets open normally, no leak or regurgitation, mean gradient 8 mmHg, TREVON 1.93 cm 2, Dimensionless index 0.6  Mild MR, no TR  EF 70%

## 2019-10-29 NOTE — ADDENDUM NOTE
Addendum  created 10/29/19 1954 by Brigido Hong MD    Review and Sign - Ready for Procedure, Review and Sign - Signed

## 2019-10-29 NOTE — ANESTHESIA PROCEDURE NOTES
Central Line      Patient reassessed immediately prior to procedure    Patient location during procedure: OR  Start time: 10/29/2019 7:05 AM  Stop Time:10/29/2019 7:17 AM  Indications: vascular access  Staff  Anesthesiologist: Truman Huizar MD  Preanesthetic Checklist  Completed: patient identified and risks and benefits discussed  Central Line Prep  Sterile Tech:cap, gloves, gown, mask and sterile barriers  Prep: chloraprep  Patient monitoring: blood pressure monitoring, continuous pulse oximetry and EKG  Central Line Procedure  Laterality:right  Location:internal jugular  Catheter Type:Cordis  Catheter Size:9 Fr  Guidance:landmark technique  Assessment  Post procedure:biopatch applied, line sutured, occlusive dressing applied and secured with tape  Assessement:blood return through all ports, free fluid flow and chest x-ray ordered  Complications:no  Patient Tolerance:patient tolerated the procedure well with no apparent complications

## 2019-10-29 NOTE — ANESTHESIA PROCEDURE NOTES
Central Line      Patient reassessed immediately prior to procedure    Patient location during procedure: OR  Start time: 10/29/2019 7:05 AM  Stop Time:10/29/2019 7:17 AM  Indications: central pressure monitoring, vascular access and MD/Surgeon request  Staff  Anesthesiologist: Truman Huizar MD  Preanesthetic Checklist  Completed: patient identified and risks and benefits discussed  Central Line Prep  Sterile Tech:cap, gloves, gown, mask and sterile barriers  Prep: chloraprep  Patient monitoring: blood pressure monitoring, continuous pulse oximetry and EKG  Central Line Procedure  Laterality:right  Location:internal jugular  Catheter Type:York-Susi  Catheter Size:7.5 Fr  Guidance:landmark technique  Assessment  Post procedure:biopatch applied, line sutured, occlusive dressing applied and secured with tape  Assessement:blood return through all ports, free fluid flow and chest x-ray ordered  Complications:no  Patient Tolerance:patient tolerated the procedure well with no apparent complications

## 2019-10-29 NOTE — ANESTHESIA PROCEDURE NOTES
Arterial Line      Patient reassessed immediately prior to procedure    Patient location during procedure: OR  Start time: 10/29/2019 6:21 AM  Stop Time:10/29/2019 6:52 AM       Line placed for hemodynamic monitoring.  Performed By   Anesthesiologist: Truman Huizar MD  Preanesthetic Checklist  Completed: patient identified and risks and benefits discussed  Arterial Line Prep   Sterile Tech: gloves  Prep: ChloraPrep  Patient monitoring: blood pressure monitoring, continuous pulse oximetry and EKG  Arterial Line Procedure   Laterality:left  Location:  radial artery  Catheter size: 20 G   Guidance: landmark technique and palpation technique  Successful placement: yes  Post Assessment   Dressing Type: occlusive dressing applied and secured with tape.   Complications no  Patient Tolerance: patient tolerated the procedure well with no apparent complications

## 2019-10-30 ENCOUNTER — APPOINTMENT (OUTPATIENT)
Dept: GENERAL RADIOLOGY | Facility: HOSPITAL | Age: 54
End: 2019-10-30

## 2019-10-30 LAB
ALBUMIN SERPL-MCNC: 4.5 G/DL (ref 3.5–5.2)
ANION GAP SERPL CALCULATED.3IONS-SCNC: 11.5 MMOL/L (ref 5–15)
BASOPHILS # BLD AUTO: 0.01 10*3/MM3 (ref 0–0.2)
BASOPHILS NFR BLD AUTO: 0.1 % (ref 0–1.5)
BUN BLD-MCNC: 14 MG/DL (ref 6–20)
BUN/CREAT SERPL: 13.1 (ref 7–25)
CALCIUM SPEC-SCNC: 7.7 MG/DL (ref 8.6–10.5)
CHLORIDE SERPL-SCNC: 111 MMOL/L (ref 98–107)
CO2 SERPL-SCNC: 23.5 MMOL/L (ref 22–29)
CREAT BLD-MCNC: 1.07 MG/DL (ref 0.76–1.27)
CYTO UR: NORMAL
DEPRECATED RDW RBC AUTO: 40.6 FL (ref 37–54)
EOSINOPHIL # BLD AUTO: 0 10*3/MM3 (ref 0–0.4)
EOSINOPHIL NFR BLD AUTO: 0 % (ref 0.3–6.2)
ERYTHROCYTE [DISTWIDTH] IN BLOOD BY AUTOMATED COUNT: 12.2 % (ref 12.3–15.4)
GFR SERPL CREATININE-BSD FRML MDRD: 73 ML/MIN/1.73
GLUCOSE BLD-MCNC: 126 MG/DL (ref 65–99)
GLUCOSE BLDC GLUCOMTR-MCNC: 107 MG/DL (ref 70–130)
GLUCOSE BLDC GLUCOMTR-MCNC: 108 MG/DL (ref 70–130)
GLUCOSE BLDC GLUCOMTR-MCNC: 110 MG/DL (ref 70–130)
GLUCOSE BLDC GLUCOMTR-MCNC: 119 MG/DL (ref 70–130)
GLUCOSE BLDC GLUCOMTR-MCNC: 127 MG/DL (ref 70–130)
GLUCOSE BLDC GLUCOMTR-MCNC: 134 MG/DL (ref 70–130)
GLUCOSE BLDC GLUCOMTR-MCNC: 147 MG/DL (ref 70–130)
GLUCOSE BLDC GLUCOMTR-MCNC: 158 MG/DL (ref 70–130)
HCT VFR BLD AUTO: 25.5 % (ref 37.5–51)
HGB BLD-MCNC: 8.4 G/DL (ref 13–17.7)
IMM GRANULOCYTES # BLD AUTO: 0.06 10*3/MM3 (ref 0–0.05)
IMM GRANULOCYTES NFR BLD AUTO: 0.6 % (ref 0–0.5)
INR PPP: 1.26 (ref 0.9–1.1)
LAB AP CASE REPORT: NORMAL
LYMPHOCYTES # BLD AUTO: 0.67 10*3/MM3 (ref 0.7–3.1)
LYMPHOCYTES NFR BLD AUTO: 6.4 % (ref 19.6–45.3)
MAGNESIUM SERPL-MCNC: 2.6 MG/DL (ref 1.6–2.6)
MCH RBC QN AUTO: 29.8 PG (ref 26.6–33)
MCHC RBC AUTO-ENTMCNC: 32.9 G/DL (ref 31.5–35.7)
MCV RBC AUTO: 90.4 FL (ref 79–97)
MONOCYTES # BLD AUTO: 0.78 10*3/MM3 (ref 0.1–0.9)
MONOCYTES NFR BLD AUTO: 7.4 % (ref 5–12)
NEUTROPHILS # BLD AUTO: 9.01 10*3/MM3 (ref 1.7–7)
NEUTROPHILS NFR BLD AUTO: 85.5 % (ref 42.7–76)
NRBC BLD AUTO-RTO: 0.1 /100 WBC (ref 0–0.2)
PATH REPORT.FINAL DX SPEC: NORMAL
PATH REPORT.GROSS SPEC: NORMAL
PHOSPHATE SERPL-MCNC: 4 MG/DL (ref 2.5–4.5)
PLATELET # BLD AUTO: 115 10*3/MM3 (ref 140–450)
PMV BLD AUTO: 9.5 FL (ref 6–12)
POTASSIUM BLD-SCNC: 3.6 MMOL/L (ref 3.5–5.2)
POTASSIUM BLD-SCNC: 3.8 MMOL/L (ref 3.5–5.2)
PROTHROMBIN TIME: 15.5 SECONDS (ref 11.7–14.2)
RBC # BLD AUTO: 2.82 10*6/MM3 (ref 4.14–5.8)
SODIUM BLD-SCNC: 146 MMOL/L (ref 136–145)
WBC NRBC COR # BLD: 10.53 10*3/MM3 (ref 3.4–10.8)

## 2019-10-30 PROCEDURE — 84132 ASSAY OF SERUM POTASSIUM: CPT | Performed by: THORACIC SURGERY (CARDIOTHORACIC VASCULAR SURGERY)

## 2019-10-30 PROCEDURE — 99254 IP/OBS CNSLTJ NEW/EST MOD 60: CPT | Performed by: INTERNAL MEDICINE

## 2019-10-30 PROCEDURE — 85610 PROTHROMBIN TIME: CPT | Performed by: THORACIC SURGERY (CARDIOTHORACIC VASCULAR SURGERY)

## 2019-10-30 PROCEDURE — 25010000003 POTASSIUM CHLORIDE PER 2 MEQ: Performed by: THORACIC SURGERY (CARDIOTHORACIC VASCULAR SURGERY)

## 2019-10-30 PROCEDURE — 25010000002 FUROSEMIDE PER 20 MG: Performed by: THORACIC SURGERY (CARDIOTHORACIC VASCULAR SURGERY)

## 2019-10-30 PROCEDURE — 97110 THERAPEUTIC EXERCISES: CPT

## 2019-10-30 PROCEDURE — 99024 POSTOP FOLLOW-UP VISIT: CPT | Performed by: NURSE PRACTITIONER

## 2019-10-30 PROCEDURE — 80069 RENAL FUNCTION PANEL: CPT | Performed by: THORACIC SURGERY (CARDIOTHORACIC VASCULAR SURGERY)

## 2019-10-30 PROCEDURE — 25010000002 ENOXAPARIN PER 10 MG: Performed by: THORACIC SURGERY (CARDIOTHORACIC VASCULAR SURGERY)

## 2019-10-30 PROCEDURE — 83735 ASSAY OF MAGNESIUM: CPT | Performed by: THORACIC SURGERY (CARDIOTHORACIC VASCULAR SURGERY)

## 2019-10-30 PROCEDURE — 71045 X-RAY EXAM CHEST 1 VIEW: CPT

## 2019-10-30 PROCEDURE — 25010000002 METOCLOPRAMIDE PER 10 MG: Performed by: THORACIC SURGERY (CARDIOTHORACIC VASCULAR SURGERY)

## 2019-10-30 PROCEDURE — 25010000003 CEFAZOLIN IN DEXTROSE 2-4 GM/100ML-% SOLUTION: Performed by: THORACIC SURGERY (CARDIOTHORACIC VASCULAR SURGERY)

## 2019-10-30 PROCEDURE — P9041 ALBUMIN (HUMAN),5%, 50ML: HCPCS | Performed by: THORACIC SURGERY (CARDIOTHORACIC VASCULAR SURGERY)

## 2019-10-30 PROCEDURE — 82962 GLUCOSE BLOOD TEST: CPT

## 2019-10-30 PROCEDURE — 85025 COMPLETE CBC W/AUTO DIFF WBC: CPT | Performed by: THORACIC SURGERY (CARDIOTHORACIC VASCULAR SURGERY)

## 2019-10-30 PROCEDURE — 93005 ELECTROCARDIOGRAM TRACING: CPT | Performed by: THORACIC SURGERY (CARDIOTHORACIC VASCULAR SURGERY)

## 2019-10-30 PROCEDURE — 94799 UNLISTED PULMONARY SVC/PX: CPT

## 2019-10-30 PROCEDURE — 63710000001 INSULIN LISPRO (HUMAN) PER 5 UNITS: Performed by: THORACIC SURGERY (CARDIOTHORACIC VASCULAR SURGERY)

## 2019-10-30 PROCEDURE — 25010000002 ALBUMIN HUMAN 5% PER 50 ML: Performed by: THORACIC SURGERY (CARDIOTHORACIC VASCULAR SURGERY)

## 2019-10-30 PROCEDURE — 97162 PT EVAL MOD COMPLEX 30 MIN: CPT

## 2019-10-30 PROCEDURE — 25010000002 KETOROLAC TROMETHAMINE PER 15 MG: Performed by: THORACIC SURGERY (CARDIOTHORACIC VASCULAR SURGERY)

## 2019-10-30 PROCEDURE — 93010 ELECTROCARDIOGRAM REPORT: CPT | Performed by: INTERNAL MEDICINE

## 2019-10-30 PROCEDURE — 25010000002 MORPHINE PER 10 MG: Performed by: THORACIC SURGERY (CARDIOTHORACIC VASCULAR SURGERY)

## 2019-10-30 RX ORDER — FUROSEMIDE 10 MG/ML
40 INJECTION INTRAMUSCULAR; INTRAVENOUS ONCE
Status: COMPLETED | OUTPATIENT
Start: 2019-10-30 | End: 2019-10-30

## 2019-10-30 RX ORDER — NICOTINE POLACRILEX 4 MG
15 LOZENGE BUCCAL
Status: DISCONTINUED | OUTPATIENT
Start: 2019-10-30 | End: 2019-11-03 | Stop reason: HOSPADM

## 2019-10-30 RX ORDER — KETOROLAC TROMETHAMINE 30 MG/ML
15 INJECTION, SOLUTION INTRAMUSCULAR; INTRAVENOUS EVERY 8 HOURS
Status: COMPLETED | OUTPATIENT
Start: 2019-10-30 | End: 2019-10-30

## 2019-10-30 RX ORDER — DEXTROSE MONOHYDRATE 25 G/50ML
25 INJECTION, SOLUTION INTRAVENOUS
Status: DISCONTINUED | OUTPATIENT
Start: 2019-10-30 | End: 2019-11-03 | Stop reason: HOSPADM

## 2019-10-30 RX ADMIN — ALBUMIN HUMAN 500 ML: 0.05 INJECTION, SOLUTION INTRAVENOUS at 01:23

## 2019-10-30 RX ADMIN — POTASSIUM CHLORIDE 20 MEQ: 29.8 INJECTION, SOLUTION INTRAVENOUS at 05:07

## 2019-10-30 RX ADMIN — OXYCODONE HYDROCHLORIDE 10 MG: 5 TABLET ORAL at 03:26

## 2019-10-30 RX ADMIN — ACETAMINOPHEN ORAL SOLUTION 650 MG: 325 SOLUTION ORAL at 00:20

## 2019-10-30 RX ADMIN — POTASSIUM CHLORIDE 20 MEQ: 2 INJECTION, SOLUTION, CONCENTRATE INTRAVENOUS at 04:00

## 2019-10-30 RX ADMIN — CEFAZOLIN SODIUM 2 G: 2 INJECTION, SOLUTION INTRAVENOUS at 17:54

## 2019-10-30 RX ADMIN — ENOXAPARIN SODIUM 40 MG: 40 INJECTION SUBCUTANEOUS at 17:54

## 2019-10-30 RX ADMIN — PANTOPRAZOLE SODIUM 40 MG: 40 TABLET, DELAYED RELEASE ORAL at 06:24

## 2019-10-30 RX ADMIN — MUPIROCIN 1 APPLICATION: 20 OINTMENT TOPICAL at 22:02

## 2019-10-30 RX ADMIN — HYDROCODONE BITARTRATE AND ACETAMINOPHEN 2 TABLET: 5; 325 TABLET ORAL at 08:36

## 2019-10-30 RX ADMIN — KETOROLAC TROMETHAMINE 15 MG: 30 INJECTION, SOLUTION INTRAMUSCULAR at 23:56

## 2019-10-30 RX ADMIN — CEFAZOLIN SODIUM 2 G: 2 INJECTION, SOLUTION INTRAVENOUS at 01:39

## 2019-10-30 RX ADMIN — HYDROCODONE BITARTRATE AND ACETAMINOPHEN 2 TABLET: 5; 325 TABLET ORAL at 16:40

## 2019-10-30 RX ADMIN — INSULIN LISPRO 2 UNITS: 100 INJECTION, SOLUTION INTRAVENOUS; SUBCUTANEOUS at 12:12

## 2019-10-30 RX ADMIN — KETOROLAC TROMETHAMINE 15 MG: 30 INJECTION, SOLUTION INTRAMUSCULAR at 16:34

## 2019-10-30 RX ADMIN — MUPIROCIN 1 APPLICATION: 20 OINTMENT TOPICAL at 08:36

## 2019-10-30 RX ADMIN — METOPROLOL TARTRATE 12.5 MG: 25 TABLET ORAL at 08:35

## 2019-10-30 RX ADMIN — METOPROLOL TARTRATE 25 MG: 25 TABLET ORAL at 20:14

## 2019-10-30 RX ADMIN — MORPHINE SULFATE 1 MG: 2 INJECTION, SOLUTION INTRAMUSCULAR; INTRAVENOUS at 06:44

## 2019-10-30 RX ADMIN — CYCLOBENZAPRINE 10 MG: 10 TABLET, FILM COATED ORAL at 12:12

## 2019-10-30 RX ADMIN — ATORVASTATIN CALCIUM 40 MG: 20 TABLET, FILM COATED ORAL at 20:14

## 2019-10-30 RX ADMIN — FUROSEMIDE 40 MG: 10 INJECTION, SOLUTION INTRAMUSCULAR; INTRAVENOUS at 07:26

## 2019-10-30 RX ADMIN — METOCLOPRAMIDE 10 MG: 5 INJECTION, SOLUTION INTRAMUSCULAR; INTRAVENOUS at 00:20

## 2019-10-30 RX ADMIN — SENNOSIDES AND DOCUSATE SODIUM 2 TABLET: 8.6; 5 TABLET ORAL at 20:14

## 2019-10-30 RX ADMIN — CHLORHEXIDINE GLUCONATE 15 ML: 1.2 RINSE ORAL at 23:56

## 2019-10-30 RX ADMIN — HYDROCODONE BITARTRATE AND ACETAMINOPHEN 2 TABLET: 5; 325 TABLET ORAL at 22:02

## 2019-10-30 RX ADMIN — ALPRAZOLAM 0.25 MG: 0.25 TABLET ORAL at 03:26

## 2019-10-30 RX ADMIN — CHLORHEXIDINE GLUCONATE 15 ML: 1.2 RINSE ORAL at 12:13

## 2019-10-30 RX ADMIN — HYDROCODONE BITARTRATE AND ACETAMINOPHEN 2 TABLET: 5; 325 TABLET ORAL at 12:13

## 2019-10-30 RX ADMIN — CHLORHEXIDINE GLUCONATE 15 ML: 1.2 RINSE ORAL at 00:20

## 2019-10-30 RX ADMIN — CEFAZOLIN SODIUM 2 G: 2 INJECTION, SOLUTION INTRAVENOUS at 09:53

## 2019-10-30 RX ADMIN — ASPIRIN 81 MG: 81 TABLET, COATED ORAL at 08:35

## 2019-10-31 ENCOUNTER — APPOINTMENT (OUTPATIENT)
Dept: GENERAL RADIOLOGY | Facility: HOSPITAL | Age: 54
End: 2019-10-31

## 2019-10-31 LAB
ANION GAP SERPL CALCULATED.3IONS-SCNC: 10.2 MMOL/L (ref 5–15)
BUN BLD-MCNC: 18 MG/DL (ref 6–20)
BUN/CREAT SERPL: 15.5 (ref 7–25)
CALCIUM SPEC-SCNC: 8.9 MG/DL (ref 8.6–10.5)
CHLORIDE SERPL-SCNC: 104 MMOL/L (ref 98–107)
CO2 SERPL-SCNC: 27.8 MMOL/L (ref 22–29)
CREAT BLD-MCNC: 1.16 MG/DL (ref 0.76–1.27)
DEPRECATED RDW RBC AUTO: 41.4 FL (ref 37–54)
ERYTHROCYTE [DISTWIDTH] IN BLOOD BY AUTOMATED COUNT: 12.1 % (ref 12.3–15.4)
GFR SERPL CREATININE-BSD FRML MDRD: 66 ML/MIN/1.73
GLUCOSE BLD-MCNC: 125 MG/DL (ref 65–99)
GLUCOSE BLDC GLUCOMTR-MCNC: 125 MG/DL (ref 70–130)
GLUCOSE BLDC GLUCOMTR-MCNC: 129 MG/DL (ref 70–130)
GLUCOSE BLDC GLUCOMTR-MCNC: 135 MG/DL (ref 70–130)
GLUCOSE BLDC GLUCOMTR-MCNC: 139 MG/DL (ref 70–130)
HCT VFR BLD AUTO: 27.5 % (ref 37.5–51)
HGB BLD-MCNC: 9 G/DL (ref 13–17.7)
MCH RBC QN AUTO: 30.1 PG (ref 26.6–33)
MCHC RBC AUTO-ENTMCNC: 32.7 G/DL (ref 31.5–35.7)
MCV RBC AUTO: 92 FL (ref 79–97)
PLATELET # BLD AUTO: 120 10*3/MM3 (ref 140–450)
PMV BLD AUTO: 10.2 FL (ref 6–12)
POTASSIUM BLD-SCNC: 4 MMOL/L (ref 3.5–5.2)
RBC # BLD AUTO: 2.99 10*6/MM3 (ref 4.14–5.8)
SODIUM BLD-SCNC: 142 MMOL/L (ref 136–145)
WBC NRBC COR # BLD: 10.11 10*3/MM3 (ref 3.4–10.8)

## 2019-10-31 PROCEDURE — 80048 BASIC METABOLIC PNL TOTAL CA: CPT | Performed by: THORACIC SURGERY (CARDIOTHORACIC VASCULAR SURGERY)

## 2019-10-31 PROCEDURE — 99232 SBSQ HOSP IP/OBS MODERATE 35: CPT | Performed by: NURSE PRACTITIONER

## 2019-10-31 PROCEDURE — 97110 THERAPEUTIC EXERCISES: CPT

## 2019-10-31 PROCEDURE — 71045 X-RAY EXAM CHEST 1 VIEW: CPT

## 2019-10-31 PROCEDURE — 82962 GLUCOSE BLOOD TEST: CPT

## 2019-10-31 PROCEDURE — 25010000003 CEFAZOLIN IN DEXTROSE 2-4 GM/100ML-% SOLUTION: Performed by: THORACIC SURGERY (CARDIOTHORACIC VASCULAR SURGERY)

## 2019-10-31 PROCEDURE — 25010000002 ENOXAPARIN PER 10 MG: Performed by: THORACIC SURGERY (CARDIOTHORACIC VASCULAR SURGERY)

## 2019-10-31 PROCEDURE — 93005 ELECTROCARDIOGRAM TRACING: CPT | Performed by: THORACIC SURGERY (CARDIOTHORACIC VASCULAR SURGERY)

## 2019-10-31 PROCEDURE — 93010 ELECTROCARDIOGRAM REPORT: CPT | Performed by: INTERNAL MEDICINE

## 2019-10-31 PROCEDURE — 85027 COMPLETE CBC AUTOMATED: CPT | Performed by: THORACIC SURGERY (CARDIOTHORACIC VASCULAR SURGERY)

## 2019-10-31 PROCEDURE — 99024 POSTOP FOLLOW-UP VISIT: CPT | Performed by: NURSE PRACTITIONER

## 2019-10-31 PROCEDURE — 94799 UNLISTED PULMONARY SVC/PX: CPT

## 2019-10-31 RX ORDER — GABAPENTIN 100 MG/1
100 CAPSULE ORAL EVERY 12 HOURS SCHEDULED
Status: COMPLETED | OUTPATIENT
Start: 2019-10-31 | End: 2019-11-01

## 2019-10-31 RX ORDER — GUAIFENESIN 600 MG/1
1200 TABLET, EXTENDED RELEASE ORAL EVERY 12 HOURS SCHEDULED
Status: DISCONTINUED | OUTPATIENT
Start: 2019-10-31 | End: 2019-11-03 | Stop reason: HOSPADM

## 2019-10-31 RX ORDER — FUROSEMIDE 40 MG/1
40 TABLET ORAL DAILY
Status: DISCONTINUED | OUTPATIENT
Start: 2019-10-31 | End: 2019-11-03 | Stop reason: HOSPADM

## 2019-10-31 RX ORDER — AMIODARONE HYDROCHLORIDE 200 MG/1
400 TABLET ORAL EVERY 12 HOURS SCHEDULED
Status: DISCONTINUED | OUTPATIENT
Start: 2019-10-31 | End: 2019-11-03 | Stop reason: HOSPADM

## 2019-10-31 RX ORDER — ECHINACEA PURPUREA EXTRACT 125 MG
1 TABLET ORAL AS NEEDED
Status: DISCONTINUED | OUTPATIENT
Start: 2019-10-31 | End: 2019-11-03 | Stop reason: HOSPADM

## 2019-10-31 RX ORDER — ALPRAZOLAM 0.5 MG/1
0.5 TABLET ORAL EVERY 8 HOURS PRN
Status: DISCONTINUED | OUTPATIENT
Start: 2019-10-31 | End: 2019-11-03 | Stop reason: HOSPADM

## 2019-10-31 RX ORDER — POTASSIUM CHLORIDE 750 MG/1
20 CAPSULE, EXTENDED RELEASE ORAL DAILY
Status: DISCONTINUED | OUTPATIENT
Start: 2019-10-31 | End: 2019-11-03 | Stop reason: HOSPADM

## 2019-10-31 RX ORDER — ATORVASTATIN CALCIUM 10 MG/1
10 TABLET, FILM COATED ORAL NIGHTLY
Status: DISCONTINUED | OUTPATIENT
Start: 2019-10-31 | End: 2019-11-03 | Stop reason: HOSPADM

## 2019-10-31 RX ADMIN — CYCLOBENZAPRINE 10 MG: 10 TABLET, FILM COATED ORAL at 11:18

## 2019-10-31 RX ADMIN — FUROSEMIDE 40 MG: 40 TABLET ORAL at 11:19

## 2019-10-31 RX ADMIN — ALPRAZOLAM 0.5 MG: 0.5 TABLET ORAL at 15:07

## 2019-10-31 RX ADMIN — AMIODARONE HYDROCHLORIDE 400 MG: 200 TABLET ORAL at 21:12

## 2019-10-31 RX ADMIN — GUAIFENESIN 1200 MG: 600 TABLET, EXTENDED RELEASE ORAL at 21:12

## 2019-10-31 RX ADMIN — ATORVASTATIN CALCIUM 10 MG: 10 TABLET, FILM COATED ORAL at 21:12

## 2019-10-31 RX ADMIN — ENOXAPARIN SODIUM 40 MG: 40 INJECTION SUBCUTANEOUS at 08:46

## 2019-10-31 RX ADMIN — AMIODARONE HYDROCHLORIDE 400 MG: 200 TABLET ORAL at 11:18

## 2019-10-31 RX ADMIN — METOPROLOL TARTRATE 25 MG: 25 TABLET ORAL at 08:48

## 2019-10-31 RX ADMIN — GUAIFENESIN 1200 MG: 600 TABLET, EXTENDED RELEASE ORAL at 11:20

## 2019-10-31 RX ADMIN — ASPIRIN 81 MG: 81 TABLET, COATED ORAL at 08:48

## 2019-10-31 RX ADMIN — HYDROCODONE BITARTRATE AND ACETAMINOPHEN 2 TABLET: 5; 325 TABLET ORAL at 21:12

## 2019-10-31 RX ADMIN — MUPIROCIN: 20 OINTMENT TOPICAL at 21:12

## 2019-10-31 RX ADMIN — POTASSIUM CHLORIDE 20 MEQ: 750 CAPSULE, EXTENDED RELEASE ORAL at 11:22

## 2019-10-31 RX ADMIN — CEFAZOLIN SODIUM 2 G: 2 INJECTION, SOLUTION INTRAVENOUS at 02:41

## 2019-10-31 RX ADMIN — GABAPENTIN 100 MG: 100 CAPSULE ORAL at 11:20

## 2019-10-31 RX ADMIN — ALPRAZOLAM 0.25 MG: 0.25 TABLET ORAL at 05:15

## 2019-10-31 RX ADMIN — HYDROCODONE BITARTRATE AND ACETAMINOPHEN 2 TABLET: 5; 325 TABLET ORAL at 08:47

## 2019-10-31 RX ADMIN — CHLORHEXIDINE GLUCONATE 15 ML: 1.2 RINSE ORAL at 12:00

## 2019-10-31 RX ADMIN — SENNOSIDES AND DOCUSATE SODIUM 2 TABLET: 8.6; 5 TABLET ORAL at 21:12

## 2019-10-31 RX ADMIN — PANTOPRAZOLE SODIUM 40 MG: 40 TABLET, DELAYED RELEASE ORAL at 06:41

## 2019-10-31 RX ADMIN — HYDROCODONE BITARTRATE AND ACETAMINOPHEN 2 TABLET: 5; 325 TABLET ORAL at 04:32

## 2019-10-31 RX ADMIN — HYDROCODONE BITARTRATE AND ACETAMINOPHEN 2 TABLET: 5; 325 TABLET ORAL at 15:07

## 2019-10-31 RX ADMIN — METOPROLOL TARTRATE 25 MG: 25 TABLET ORAL at 21:12

## 2019-10-31 RX ADMIN — MUPIROCIN 1 APPLICATION: 20 OINTMENT TOPICAL at 08:47

## 2019-10-31 RX ADMIN — GABAPENTIN 100 MG: 100 CAPSULE ORAL at 21:12

## 2019-11-01 ENCOUNTER — APPOINTMENT (OUTPATIENT)
Dept: GENERAL RADIOLOGY | Facility: HOSPITAL | Age: 54
End: 2019-11-01

## 2019-11-01 LAB
ANION GAP SERPL CALCULATED.3IONS-SCNC: 11.4 MMOL/L (ref 5–15)
BUN BLD-MCNC: 17 MG/DL (ref 6–20)
BUN/CREAT SERPL: 16.8 (ref 7–25)
CALCIUM SPEC-SCNC: 9 MG/DL (ref 8.6–10.5)
CHLORIDE SERPL-SCNC: 103 MMOL/L (ref 98–107)
CO2 SERPL-SCNC: 27.6 MMOL/L (ref 22–29)
CREAT BLD-MCNC: 1.01 MG/DL (ref 0.76–1.27)
DEPRECATED RDW RBC AUTO: 40 FL (ref 37–54)
ERYTHROCYTE [DISTWIDTH] IN BLOOD BY AUTOMATED COUNT: 12 % (ref 12.3–15.4)
GFR SERPL CREATININE-BSD FRML MDRD: 78 ML/MIN/1.73
GLUCOSE BLD-MCNC: 104 MG/DL (ref 65–99)
GLUCOSE BLDC GLUCOMTR-MCNC: 107 MG/DL (ref 70–130)
GLUCOSE BLDC GLUCOMTR-MCNC: 112 MG/DL (ref 70–130)
GLUCOSE BLDC GLUCOMTR-MCNC: 133 MG/DL (ref 70–130)
GLUCOSE BLDC GLUCOMTR-MCNC: 142 MG/DL (ref 70–130)
HCT VFR BLD AUTO: 28.9 % (ref 37.5–51)
HGB BLD-MCNC: 9.4 G/DL (ref 13–17.7)
MCH RBC QN AUTO: 29.5 PG (ref 26.6–33)
MCHC RBC AUTO-ENTMCNC: 32.5 G/DL (ref 31.5–35.7)
MCV RBC AUTO: 90.6 FL (ref 79–97)
PLATELET # BLD AUTO: 144 10*3/MM3 (ref 140–450)
PMV BLD AUTO: 9.8 FL (ref 6–12)
POTASSIUM BLD-SCNC: 4.3 MMOL/L (ref 3.5–5.2)
RBC # BLD AUTO: 3.19 10*6/MM3 (ref 4.14–5.8)
SODIUM BLD-SCNC: 142 MMOL/L (ref 136–145)
WBC NRBC COR # BLD: 7.91 10*3/MM3 (ref 3.4–10.8)

## 2019-11-01 PROCEDURE — 71046 X-RAY EXAM CHEST 2 VIEWS: CPT

## 2019-11-01 PROCEDURE — 99024 POSTOP FOLLOW-UP VISIT: CPT | Performed by: NURSE PRACTITIONER

## 2019-11-01 PROCEDURE — 80048 BASIC METABOLIC PNL TOTAL CA: CPT | Performed by: THORACIC SURGERY (CARDIOTHORACIC VASCULAR SURGERY)

## 2019-11-01 PROCEDURE — 99232 SBSQ HOSP IP/OBS MODERATE 35: CPT | Performed by: NURSE PRACTITIONER

## 2019-11-01 PROCEDURE — 25010000002 MAGNESIUM SULFATE IN D5W 1G/100ML (PREMIX) 1-5 GM/100ML-% SOLUTION: Performed by: NURSE PRACTITIONER

## 2019-11-01 PROCEDURE — 97110 THERAPEUTIC EXERCISES: CPT

## 2019-11-01 PROCEDURE — 94762 N-INVAS EAR/PLS OXIMTRY CONT: CPT

## 2019-11-01 PROCEDURE — 25010000002 FUROSEMIDE PER 20 MG: Performed by: NURSE PRACTITIONER

## 2019-11-01 PROCEDURE — 25010000002 ENOXAPARIN PER 10 MG: Performed by: THORACIC SURGERY (CARDIOTHORACIC VASCULAR SURGERY)

## 2019-11-01 PROCEDURE — 85027 COMPLETE CBC AUTOMATED: CPT | Performed by: THORACIC SURGERY (CARDIOTHORACIC VASCULAR SURGERY)

## 2019-11-01 PROCEDURE — 82962 GLUCOSE BLOOD TEST: CPT

## 2019-11-01 RX ORDER — MAGNESIUM SULFATE 1 G/100ML
1 INJECTION INTRAVENOUS ONCE
Status: COMPLETED | OUTPATIENT
Start: 2019-11-01 | End: 2019-11-01

## 2019-11-01 RX ORDER — FUROSEMIDE 10 MG/ML
20 INJECTION INTRAMUSCULAR; INTRAVENOUS ONCE
Status: COMPLETED | OUTPATIENT
Start: 2019-11-01 | End: 2019-11-01

## 2019-11-01 RX ORDER — PROPRANOLOL HYDROCHLORIDE 10 MG/1
10 TABLET ORAL EVERY 8 HOURS SCHEDULED
Status: DISCONTINUED | OUTPATIENT
Start: 2019-11-01 | End: 2019-11-01

## 2019-11-01 RX ORDER — POLYETHYLENE GLYCOL 3350 17 G/17G
17 POWDER, FOR SOLUTION ORAL DAILY
Status: DISCONTINUED | OUTPATIENT
Start: 2019-11-01 | End: 2019-11-03 | Stop reason: HOSPADM

## 2019-11-01 RX ORDER — PROPRANOLOL HYDROCHLORIDE 10 MG/1
10 TABLET ORAL EVERY 8 HOURS SCHEDULED
Status: DISCONTINUED | OUTPATIENT
Start: 2019-11-02 | End: 2019-11-03 | Stop reason: HOSPADM

## 2019-11-01 RX ORDER — POTASSIUM CHLORIDE 750 MG/1
20 CAPSULE, EXTENDED RELEASE ORAL ONCE
Status: COMPLETED | OUTPATIENT
Start: 2019-11-01 | End: 2019-11-01

## 2019-11-01 RX ADMIN — SENNOSIDES AND DOCUSATE SODIUM 2 TABLET: 8.6; 5 TABLET ORAL at 21:40

## 2019-11-01 RX ADMIN — ATORVASTATIN CALCIUM 10 MG: 10 TABLET, FILM COATED ORAL at 21:40

## 2019-11-01 RX ADMIN — POTASSIUM CHLORIDE 20 MEQ: 750 CAPSULE, EXTENDED RELEASE ORAL at 11:29

## 2019-11-01 RX ADMIN — MUPIROCIN 1 APPLICATION: 20 OINTMENT TOPICAL at 08:40

## 2019-11-01 RX ADMIN — PANTOPRAZOLE SODIUM 40 MG: 40 TABLET, DELAYED RELEASE ORAL at 06:48

## 2019-11-01 RX ADMIN — AMIODARONE HYDROCHLORIDE 400 MG: 200 TABLET ORAL at 08:40

## 2019-11-01 RX ADMIN — GUAIFENESIN 1200 MG: 600 TABLET, EXTENDED RELEASE ORAL at 21:40

## 2019-11-01 RX ADMIN — POTASSIUM CHLORIDE 20 MEQ: 750 CAPSULE, EXTENDED RELEASE ORAL at 08:40

## 2019-11-01 RX ADMIN — GABAPENTIN 100 MG: 100 CAPSULE ORAL at 08:39

## 2019-11-01 RX ADMIN — HYDROCODONE BITARTRATE AND ACETAMINOPHEN 2 TABLET: 5; 325 TABLET ORAL at 11:29

## 2019-11-01 RX ADMIN — POLYETHYLENE GLYCOL 3350 17 G: 17 POWDER, FOR SOLUTION ORAL at 11:29

## 2019-11-01 RX ADMIN — ASPIRIN 81 MG: 81 TABLET, COATED ORAL at 08:40

## 2019-11-01 RX ADMIN — MAGNESIUM SULFATE 1 G: 1 INJECTION INTRAVENOUS at 11:29

## 2019-11-01 RX ADMIN — GABAPENTIN 100 MG: 100 CAPSULE ORAL at 21:40

## 2019-11-01 RX ADMIN — ENOXAPARIN SODIUM 40 MG: 40 INJECTION SUBCUTANEOUS at 08:40

## 2019-11-01 RX ADMIN — FUROSEMIDE 40 MG: 40 TABLET ORAL at 08:40

## 2019-11-01 RX ADMIN — HYDROCODONE BITARTRATE AND ACETAMINOPHEN 2 TABLET: 5; 325 TABLET ORAL at 02:43

## 2019-11-01 RX ADMIN — GUAIFENESIN 1200 MG: 600 TABLET, EXTENDED RELEASE ORAL at 08:42

## 2019-11-01 RX ADMIN — CYCLOBENZAPRINE 10 MG: 10 TABLET, FILM COATED ORAL at 21:40

## 2019-11-01 RX ADMIN — HYDROCODONE BITARTRATE AND ACETAMINOPHEN 2 TABLET: 5; 325 TABLET ORAL at 06:48

## 2019-11-01 RX ADMIN — AMIODARONE HYDROCHLORIDE 400 MG: 200 TABLET ORAL at 21:40

## 2019-11-01 RX ADMIN — PROPRANOLOL HYDROCHLORIDE 10 MG: 10 TABLET ORAL at 19:46

## 2019-11-01 RX ADMIN — FUROSEMIDE 20 MG: 10 INJECTION, SOLUTION INTRAMUSCULAR; INTRAVENOUS at 11:29

## 2019-11-01 RX ADMIN — HYDROCODONE BITARTRATE AND ACETAMINOPHEN 2 TABLET: 5; 325 TABLET ORAL at 19:58

## 2019-11-01 RX ADMIN — METOPROLOL TARTRATE 25 MG: 25 TABLET ORAL at 08:39

## 2019-11-01 NOTE — CONSULTS
FIRST UROLOGY CONSULT      Patient Identification:  NAME:  Brigido Caraballo  Age:  54 y.o.   Sex:  male   :  1965   MRN:  [id number]       Chief complaint: Skin flaking on scrotum.      History of present illness:  A/p aneurysm repair. Pt noticed dry skin on scrotum x 24 hours. No pain. No swelling.         Past medical history:  Past Medical History:   Diagnosis Date   • Abnormal EKG     Baseline anterolateral T wave abnormalities    • Aortic aneurysm (CMS/HCC)    • Aortic stenosis    • Arthritis     THUMBS   • Bicuspid aortic valve    • Diabetes (CMS/HCC)    • Heart murmur    • Hyperlipidemia    • Hypertension    • ZENON (obstructive sleep apnea)     Intolerant to CPAP  TESTED 20 YRS AGO  NO PROBLEMS NOW USES  NOSE STRIP AT NIGHT       Past surgical history:  Past Surgical History:   Procedure Laterality Date   • ASCENDING ARCH/HEMIARCH REPLACEMENT N/A 10/29/2019    Procedure: INTRAOPERATIVE RICHARD; STERNOTOMY THORACIC AORTIC ANEURYSM ASCENDING ARCH REPAIR WITH CIRCULATORY ARREST; AORTIC VALVE REPLACEMENT AND PRP.;  Surgeon: Felipe Shell MD;  Location: [facility] OR;  Service: Cardiothoracic   • CARDIAC CATHETERIZATION N/A 2019    Procedure: Left Heart Cath (pre-op aortic valve replacement and aortic aneurysm repair);  Surgeon: Crow Ely MD;  Location: [facility] CATH INVASIVE LOCATION;  Service: Cardiovascular       Allergies:  Patient has no known allergies.    Home medications:  Medications Prior to Admission   Medication Sig Dispense Refill Last Dose   • aspirin 81 MG EC tablet Take 81 mg by mouth Daily.   10/28/2019 at 0730   • atorvastatin (LIPITOR) 10 MG tablet Take 10 mg by mouth Every Night.   10/28/2019 at 2200   • chlorhexidine (PERIDEX) 0.12 % solution Apply 15 mL to the mouth or throat. AS DIRECTED   10/29/2019 at 0400   • Chlorhexidine Gluconate 2 % pads Apply  topically. AS DIRECTED   10/29/2019 at 0400   • Cholecalciferol (VITAMIN D3) 5000 units capsule capsule Take 5,000  Units by mouth Daily.   10/25/2019   • CINNAMON PO Take 1 tablet by mouth As Needed.   10/25/2019   • Coenzyme Q10 10 MG capsule Take 200 mg by mouth Daily.   10/25/2019   • Flaxseed, Linseed, (FLAX SEEDS PO) Take 1,000 mg by mouth Daily.   10/25/2019   • Garlic 500 MG tablet Take 1 tablet by mouth Daily.   10/25/2019   • Ginger, Zingiber officinalis, (GINGER ROOT) 500 MG capsule Take 550 mg by mouth Daily.   10/25/2019   • metFORMIN (GLUCOPHAGE) 1000 MG tablet Take 1,000 mg by mouth 2 (Two) Times a Day With Meals. IF BS IS LOW WILL ONLY TAKE 500 MG   10/28/2019 at 0730   • mupirocin (BACTROBAN) 2 % ointment Apply  topically to the appropriate area as directed. AS DIRECTED   10/29/2019 at 0400   • propranolol (INDERAL) 10 MG tablet Take 10 mg by mouth 3 (Three) Times a Day.   10/29/2019 at 0400        Hospital medications:    amiodarone 400 mg Oral Q12H   aspirin 81 mg Oral Daily   atorvastatin 10 mg Oral Nightly   enoxaparin 40 mg Subcutaneous Daily   furosemide 40 mg Oral Daily   gabapentin 100 mg Oral Q12H   guaiFENesin 1,200 mg Oral Q12H   insulin lispro 0-9 Units Subcutaneous 4x Daily With Meals & Nightly   mupirocin  Each Nare BID   pantoprazole 40 mg Oral Q AM   polyethylene glycol 17 g Oral Daily   potassium chloride 20 mEq Oral Daily   propranolol 10 mg Oral Q8H   senna-docusate sodium 2 tablet Oral Nightly       sodium chloride 30 mL/hr Last Rate: 30 mL/hr (10/29/19 1153)   sodium chloride 30 mL/hr      •  acetaminophen **OR** acetaminophen **OR** acetaminophen  •  ALPRAZolam  •  bisacodyl  •  bisacodyl  •  cyclobenzaprine  •  dextrose  •  dextrose  •  glucagon (human recombinant)  •  HYDROcodone-acetaminophen  •  magnesium hydroxide  •  Morphine **AND** naloxone  •  Morphine  •  ondansetron  •  oxyCODONE  •  potassium chloride **OR** potassium chloride  •  potassium chloride **OR** potassium chloride  •  potassium chloride  •  promethazine **OR** promethazine  •  sodium chloride  •  sodium chloride  •   sodium chloride    Family history:  Family History   Problem Relation Age of Onset   • Coronary artery disease Mother    • Aortic stenosis Mother    • Stroke Mother    • Diabetes Father    • Stroke Father    • Malig Hyperthermia Neg Hx        Social history:  Social History     Tobacco Use   • Smoking status: Former Smoker     Packs/day: 1.00     Years: 20.00     Pack years: 20.00     Types: Cigarettes     Last attempt to quit:      Years since quittin.8   • Smokeless tobacco: Never Used   • Tobacco comment: Smoked 1 ppd for 25 years - quit 2011   Substance Use Topics   • Alcohol use: Yes     Frequency: Never     Comment: SOCIALLY   • Drug use: No       Review of systems:      Positive for:  Dry scrotal skin.    Negative for:  Fever.      Objective:  TMax 24 hours:   Temp (24hrs), Av.4 °F (36.9 °C), Min:97.7 °F (36.5 °C), Max:99.3 °F (37.4 °C)      Vitals Ranges:   Temp:  [97.7 °F (36.5 °C)-99.3 °F (37.4 °C)] 98.1 °F (36.7 °C)  Heart Rate:  [] 100  Resp:  [16-18] 17  BP: (117-136)/(60-82) 120/82    Intake/Output Last 3 shifts:  I/O last 3 completed shifts:  In: 1018 [P.O.:420; I.V.:598]  Out: 2995 [Urine:2850; Chest Tube:145]     Physical Exam:    General Appearance:    Alert, cooperative, NAD   HEENT:    No trauma, pupils reactive, hearing intact   Back:     No CVA tenderness   Lungs:     Respirations unlabored, no wheezing    Heart:    RRR.   Abdomen:     Soft, NDNT, no masses, no guarding   :    Testes descended bilaterally, no nodules.  Penis normal.  No scrotal or penile rashes noted. Dry skin scrotum.. No cellulitis.     Extremities:   No edema, no deformity   Lymphatic:   No neck or groin LAD   Skin:   No bleeding, bruising or rashes   Neuro/Psych:   Orientation intact, mood/affect pleasant, no focal findings       Results review:   I reviewed the patient's new clinical results.    Data review:  Lab Results (last 24 hours)     Procedure Component Value Units Date/Time    POC Glucose Once  [[id number]]  (Normal) Collected:  11/01/19 1544    Specimen:  Blood Updated:  11/01/19 1546     Glucose 112 mg/dL     POC Glucose Once [[id number]]  (Abnormal) Collected:  11/01/19 1053    Specimen:  Blood Updated:  11/01/19 1056     Glucose 142 mg/dL     POC Glucose Once [[id number]]  (Normal) Collected:  11/01/19 0622    Specimen:  Blood Updated:  11/01/19 0623     Glucose 107 mg/dL     Basic Metabolic Panel [[id number]]  (Abnormal) Collected:  11/01/19 0256    Specimen:  Blood Updated:  11/01/19 0414     Glucose 104 mg/dL      BUN 17 mg/dL      Creatinine 1.01 mg/dL      Sodium 142 mmol/L      Potassium 4.3 mmol/L      Chloride 103 mmol/L      CO2 27.6 mmol/L      Calcium 9.0 mg/dL      eGFR Non African Amer 78 mL/min/1.73      BUN/Creatinine Ratio 16.8     Anion Gap 11.4 mmol/L     Narrative:       GFR Normal >60  Chronic Kidney Disease <60  Kidney Failure <15    CBC (No Diff) [[id number]]  (Abnormal) Collected:  11/01/19 0256    Specimen:  Blood Updated:  11/01/19 0352     WBC 7.91 10*3/mm3      RBC 3.19 10*6/mm3      Hemoglobin 9.4 g/dL      Hematocrit 28.9 %      MCV 90.6 fL      MCH 29.5 pg      MCHC 32.5 g/dL      RDW 12.0 %      RDW-SD 40.0 fl      MPV 9.8 fL      Platelets 144 10*3/mm3     POC Glucose Once [[id number]]  (Abnormal) Collected:  10/31/19 1954    Specimen:  Blood Updated:  10/31/19 1955     Glucose 135 mg/dL            Imaging:  Imaging Results (Last 24 Hours)     Procedure Component Value Units Date/Time    XR Chest PA & Lateral [[id number]] Collected:  11/01/19 1128     Updated:  11/01/19 1434    Narrative:       2-VIEW CHEST     HISTORY: Recent cardiac surgery. Atelectasis.     FINDINGS: The lungs are moderately expanded with improvement in some  right basilar atelectasis and pleural effusion compared to yesterday's  exam. There is no pneumothorax. The heart remains mildly enlarged.     This report was finalized on 11/1/2019 2:31 PM by Dr. Ryan Cardozo M.D.       XR Chest 1 View [[id number]]  Collected:  10/31/19 0634     Updated:  11/01/19 0124    Narrative:       PORTABLE CHEST X-RAY     CLINICAL HISTORY: Post-Op Heart Surgery; I71.2-Thoracic aortic aneurysm,  without rupture     COMPARISON: 10/30/2019.     FINDINGS: Portable AP view of the chest was obtained with overlying  monitor leads in place. Life support lines are unchanged and there is no  pneumothorax. Lungs are slightly better aerated. Increasing right lung  base atelectasis. No edema or significant pleural fluid. Stable  cardiomegaly.             Impression:       Slight increase in right lung base atelectasis.        This report was finalized on 11/1/2019 1:21 AM by Cruz Fox M.D.                Assessment:       Thoracic aortic aneurysm without rupture (CMS/HCC)    Dry skin scrotum.  No evidence of swelling, infection.      Plan:     Should resolve with hydration skin lotion.      Elroy Galindo MD  11/01/19  4:44 PM

## 2019-11-01 NOTE — PROGRESS NOTES
"[facility]    Patient Name: Brigido Caraballo  :1965  54 y.o.  LOS: 3  Encounter Provider: KAIT Arciniega      Patient Care Team:  Marjorie Danielle MD as PCP - General (Internal Medicine)  Brayden Win MD as Consulting Physician (Cardiology)    Chief Complaint:  S/p AVR and ascending aneurysm repair    Interval History: Rate & BP controlled. Using IS frequently.  Has been ambulatory with PT. patient complains of redness and dry flaky skin to his testicle region.  Denies difficulty with urinating since Diaz catheter has been removed.       Objective   Vital Signs  Temp:  [97.7 °F (36.5 °C)-99.3 °F (37.4 °C)] 98 °F (36.7 °C)  Heart Rate:  [] 110  Resp:  [16-18] 17  BP: (116-136)/(60-76) 136/75    Intake/Output Summary (Last 24 hours) at 2019 1241  Last data filed at 2019 0821  Gross per 24 hour   Intake 310 ml   Output 1865 ml   Net -1555 ml     Flowsheet Rows      First Filed Value   Admission Height  177.8 cm (70\") Documented at 10/29/2019 0547   Admission Weight  93.7 kg (206 lb 9 oz) Documented at 10/29/2019 0547            Physical Exam   Constitutional: He is oriented to person, place, and time. Vital signs are normal. He appears well-developed and well-nourished.   Eyes: Conjunctivae are normal.   Neck: Carotid bruit is not present.   Cardiovascular: Normal rate, regular rhythm and normal heart sounds.   No murmur heard.  Pulmonary/Chest: Effort normal and breath sounds normal.   Abdominal: Normal appearance.   Genitourinary:   Genitourinary Comments: Testicular exam was witnessed by patient's wife.  Patient's testicles are dry, flaky, erythematous.  He does have 2 scabbed regions on the left testicle.   Musculoskeletal: Normal range of motion.   No pedal edema   Neurological: He is alert and oriented to person, place, and time. GCS eye subscore is 4. GCS verbal subscore is 5. GCS motor subscore is 6.   Skin: Skin is warm and dry.   Psychiatric: He has " a normal mood and affect. His speech is normal and behavior is normal. Judgment and thought content normal. Cognition and memory are normal.       Results Review:    Results from last 7 days   Lab Units 11/01/19  0256   SODIUM mmol/L 142   POTASSIUM mmol/L 4.3   CHLORIDE mmol/L 103   CO2 mmol/L 27.6   BUN mg/dL 17   CREATININE mg/dL 1.01   GLUCOSE mg/dL 104*   CALCIUM mg/dL 9.0         Results from last 7 days   Lab Units 11/01/19  0256   WBC 10*3/mm3 7.91   HEMOGLOBIN g/dL 9.4*   HEMATOCRIT % 28.9*   PLATELETS 10*3/mm3 144     Results from last 7 days   Lab Units 10/30/19  0300 10/29/19  1144 10/29/19  1024 10/29/19  1018   INR  1.26* 1.41* 2.1* 1.68*   APTT seconds  --  27.7  --  25.5     Results from last 7 days   Lab Units 10/30/19  0300   MAGNESIUM mg/dL 2.6                   Medication Review:     amiodarone 400 mg Oral Q12H   aspirin 81 mg Oral Daily   atorvastatin 10 mg Oral Nightly   enoxaparin 40 mg Subcutaneous Daily   furosemide 40 mg Oral Daily   gabapentin 100 mg Oral Q12H   guaiFENesin 1,200 mg Oral Q12H   insulin lispro 0-9 Units Subcutaneous 4x Daily With Meals & Nightly   mupirocin  Each Nare BID   pantoprazole 40 mg Oral Q AM   polyethylene glycol 17 g Oral Daily   potassium chloride 20 mEq Oral Daily   propranolol 10 mg Oral Q8H   senna-docusate sodium 2 tablet Oral Nightly          sodium chloride 30 mL/hr Last Rate: 30 mL/hr (10/29/19 1153)   sodium chloride 30 mL/hr        Assessment/Plan   1. Severe aortic stenosis, ascending aortic aneurysm: s/p AVR, aneurysm repair 10/29  2. Hypertension  3. Obstructive sleep apnea  4. Diabetes  5. Postop anemia  6. Thrombocytopenia    -Heart rate controlled in the 90s.  Continue metoprolol tartrate 25 mg twice daily    -Blood pressure controlled    -Hemoglobin stable at 9.4    -Encourage pulmonary toilet, wean O2 as tolerated    -Planning for home within the next few days with home health    -Patient has erythema to the testicles.  Will consult urology for  further evaluation.    KAIT Arciniega  [facility]  11/01/19  12:41 PM

## 2019-11-01 NOTE — DISCHARGE PLACEMENT REQUEST
"Davon Caraballo (54 y.o. Male)     Date of Birth Social Security Number Address Home Phone MRN    1965  [address] [address] [phone] [id number]    [employer] Marital Status          Gnosticist        Admission Date Admission Type Admitting Provider Attending Provider Department, Room/Bed    10/29/19 Elective Felipe Shell MD Pagni, Sebastian, MD [facility] CARDIOVASC UNIT, 2227/1    Discharge Date Discharge Disposition Discharge Destination                       Attending Provider:  Felipe Shell MD    Allergies:  No Known Allergies    Isolation:  None   Infection:  None   Code Status:  CPR    Ht:  177.8 cm (70\")   Wt:  92.7 kg (204 lb 6.4 oz)    Admission Cmt:  None   Principal Problem:  Thoracic aortic aneurysm without rupture (CMS/HCC) [I71.2] More...                 Active Insurance as of 10/29/2019     Primary Coverage     Payor Plan Insurance Group Employer/Plan Group    [facility]      Payor Plan Address Payor Plan Phone Number Payor Plan Fax Number Effective Dates    [address] [address]   11/1/2018 - None Entered    [place] [address]       Subscriber Name Subscriber Birth Date Member ID       DAVON CARABALLO 1965 [id number]                 Emergency Contacts      (Rel.) Home Phone Work Phone Mobile Phone    PANFILO HAMILTON (Spouse) [phone] -- [phone]              "

## 2019-11-01 NOTE — PROGRESS NOTES
Discharge Planning Assessment  [facility]     Patient Name: Brigido Caraballo  MRN: [id number]  Today's Date: 11/1/2019    Admit Date: 10/29/2019    Discharge Needs Assessment     Row Name 11/01/19 0923       Living Environment    Lives With  spouse    Name(s) of Who Lives With Patient  Yodit Caraballo, spouse    Current Living Arrangements  home/apartment/condo    Primary Care Provided by  self    Provides Primary Care For  pet(s) 3 dogs & 1 cat    Family Caregiver if Needed  spouse    Quality of Family Relationships  helpful;involved;supportive    Able to Return to Prior Arrangements  yes       Resource/Environmental Concerns    Resource/Environmental Concerns  none       Transition Planning    Patient/Family Anticipates Transition to  home with family    Patient/Family Anticipated Services at Transition  home health care       Discharge Needs Assessment    Concerns to be Addressed  discharge planning    Equipment Currently Used at Home  glucometer Pt reports he no longer uses a CPAP.      Anticipated Changes Related to Illness  none    Equipment Needed After Discharge  none    Discharge Facility/Level of Care Needs  home with home health    Offered/Gave Vendor List  yes    Patient's Choice of Community Agency(s)  Pt chose [facility]        Discharge Plan     Row Name 11/01/19 0949       Plan    Plan  Home w/ [facility]    Patient/Family in Agreement with Plan  yes    Plan Comments  Spoke with Pt and spouse Yodit Caraballo ([phone]) at bedside.  CCP introduced self and role.  Pt confirmed information on face sheet.  Pt stated he is IADL'S, works full time & drives.  Pt lives in a house with four entrance steps with his spouse, three dogs & one cat.  Pt reports PCP is Sukhwinder Danielle MD.  Pt [facility] [address] [facility].  Pt has been enrolled in [facility].  Pt denies issues with affording his medications.  Pt denies past home health and sub-acute rehab.  Pt has a glucometer and blood pressure monitoring  machine at home.  Pt plans to return home at discharge with 24-hour assistance of his spouse.  Pt chose [facility] to follow him at discharge from list provided.  Referral given to Caterina/HC.  CCP will continue to follow…DAVID REYNOSO/CCP        Destination      No service coordination in this encounter.      Durable Medical Equipment      No service coordination in this encounter.      Dialysis/Infusion      No service coordination in this encounter.      Home Medical Care      Service Provider Request Status Selected Services Address Phone Number Fax Number    [facility] Pending - Request Sent N/A [address] [address] [phone]      Therapy      No service coordination in this encounter.      Community Resources      No service coordination in this encounter.          Demographic Summary     Row Name 11/01/19 0923       General Information    Admission Type  inpatient    Arrived From  operating room;home    Referral Source  admission list;physician    Reason for Consult  discharge planning    Preferred Language  English     Used During This Interaction  no        Functional Status     Row Name 11/01/19 0923       Functional Status    Usual Activity Tolerance  good    Current Activity Tolerance  fair       Functional Status, IADL    Medications  independent    Meal Preparation  independent    Housekeeping  independent    Laundry  independent    Shopping  independent       Mental Status    General Appearance WDL  WDL       Mental Status Summary    Recent Changes in Mental Status/Cognitive Functioning  no changes       Employment/    Employment Status  employed full time        Psychosocial    No documentation.       Abuse/Neglect    No documentation.       Legal    No documentation.       Substance Abuse    No documentation.       Patient Forms    No documentation.           Jeanna Lorenzana RN

## 2019-11-01 NOTE — PLAN OF CARE
Problem: Patient Care Overview  Goal: Plan of Care Review   11/01/19 0959   Coping/Psychosocial   Plan of Care Reviewed With patient;spouse   OTHER   Outcome Summary Pt is displaying great recovery and increasing his independence with ambulation and functional mobility. He is still very limited by SOA with all standing activity and needs to take multiple rest breaks in standing while walking in the halls and ascending steps. Pt was able to walk up and down 8 steps with continuous CGA this visit and reported moderate to severe SOA that resolved quickly. He reported wanting to continue therapy for one more visit in an attempt to improve his walking endurance and ascend/descend steps with therapists one more time.

## 2019-11-01 NOTE — THERAPY TREATMENT NOTE
Patient Name: Brigido Caraballo  : 1965    MRN: [id number]                              Today's Date: 2019       Admit Date: 10/29/2019    Visit Dx:     ICD-10-CM ICD-9-CM   1. Thoracic aortic aneurysm without rupture (CMS/HCC) I71.2 441.2     Patient Active Problem List   Diagnosis   • Ascending aortic aneurysm (CMS/HCC)   • Obesity (BMI 30-39.9)   • Aortic stenosis, severe   • ZENON (obstructive sleep apnea)   • HTN (hypertension), benign   • Bicuspid aortic valve   • Abnormal EKG   • Type 2 diabetes mellitus without complication, without long-term current use of insulin (CMS/HCC)   • Nonrheumatic aortic valve stenosis   • Thoracic aortic aneurysm without rupture (CMS/HCC)     Past Medical History:   Diagnosis Date   • Abnormal EKG     Baseline anterolateral T wave abnormalities    • Aortic aneurysm (CMS/HCC)    • Aortic stenosis    • Arthritis     THUMBS   • Bicuspid aortic valve    • Diabetes (CMS/HCC)    • Heart murmur    • Hyperlipidemia    • Hypertension    • ZENON (obstructive sleep apnea)     Intolerant to CPAP  TESTED 20 YRS AGO  NO PROBLEMS NOW USES  NOSE STRIP AT NIGHT     Past Surgical History:   Procedure Laterality Date   • ASCENDING ARCH/HEMIARCH REPLACEMENT N/A 10/29/2019    Procedure: INTRAOPERATIVE RICHARD; STERNOTOMY THORACIC AORTIC ANEURYSM ASCENDING ARCH REPAIR WITH CIRCULATORY ARREST; AORTIC VALVE REPLACEMENT AND PRP.;  Surgeon: Felipe Shell MD;  Location: [facility] OR;  Service: Cardiothoracic   • CARDIAC CATHETERIZATION N/A 2019    Procedure: Left Heart Cath (pre-op aortic valve replacement and aortic aneurysm repair);  Surgeon: Crow Ely MD;  Location: [facility] INVASIVE LOCATION;  Service: Cardiovascular     General Information     Row Name 19 0953          PT Evaluation Time/Intention    Document Type  therapy note (daily note)  (Pended)   -AP     Mode of Treatment  physical therapy  (Pended)   -AP     Row Name 19 0953          General Information     Patient Profile Reviewed?  yes  (Pended)   -     Existing Precautions/Restrictions  cardiac;fall;sternal  (Pended)   -     Row Name 11/01/19 0953          Cognitive Assessment/Intervention- PT/OT    Orientation Status (Cognition)  oriented x 4  (Pended)   -     Row Name 11/01/19 0953          Safety Issues, Functional Mobility    Impairments Affecting Function (Mobility)  endurance/activity tolerance;shortness of breath  (Pended)   -       User Key  (r) = Recorded By, (t) = Taken By, (c) = Cosigned By    Initials Name Provider Type     Aury Martinez, PT Student PT Student        Mobility     Row Name 11/01/19 0954          Bed Mobility Assessment/Treatment    Brad[place] Level (Bed Mobility)  not tested  (Pended)   -AP     Comment (Bed Mobility)  in bedside chair, no bed mobility tested  (Pended)   -     Row Name 11/01/19 0954          Sit-Stand Transfer    Sit-Stand [place] (Transfers)  contact guard  (Pended)   -     Row Name 11/01/19 0954          Gait/Stairs Assessment/Training    Gait/Stairs Assessment/Training  gait/ambulation independence  (Pended)   -     Bradford Level (Gait)  contact guard  (Pended)  hand hold assist of wife on other side  -AP     Distance in Feet (Gait)  130  (Pended)   -AP     Deviations/Abnormal Patterns (Gait)  gait speed decreased;base of support, wide  (Pended)   -AP     Bilateral Gait Deviations  heel strike decreased  (Pended)   -       User Key  (r) = Recorded By, (t) = Taken By, (c) = Cosigned By    Initials Name Provider Type    Aury Vegas PT Student PT Student        Obj/Interventions     Row Name 11/01/19 0955          Therapeutic Exercise    Sets/Reps (Therapeutic Exercise)  1/5  (Pended)   -     Comment (Therapeutic Exercise)  5 reps per cardiac protocol, level 4/5 due to SOA- wants to be able to walk 200 feet and do steps  (Pended)   -     Row Name 11/01/19 0955          Static Standing Balance    Level of Bradford (Supported  Standing, Static Balance)  standby assist  (Pended)   -AP     Row Name 11/01/19 0955          Dynamic Standing Balance    Level of [place], Reaches Outside Midline (Standing, Dynamic Balance)  contact guard assist  (Pended)   -AP     Row Name 11/01/19 0955          Sensory Assessment/Intervention    Sensory General Assessment  no sensation deficits identified  (Pended)   -       User Key  (r) = Recorded By, (t) = Taken By, (c) = Cosigned By    Initials Name Provider Type    Aury Vegas, PT Student PT Student        Goals/Plan    No documentation.       Clinical Impression     Row Name 11/01/19 0957          Pain Scale: Numbers Pre/Post-Treatment    Pain Scale: Numbers, Pretreatment  0/10 - no pain  (Pended)   -AP     Pre/Post Treatment Pain Comment  no pain reported but has mild discomfort especially when moving arms  (Pended)   -     Pain Intervention(s)  Repositioned;Ambulation/increased activity  (Pended)   -AP     Row Name 11/01/19 0957          Plan of Care Review    Plan of Care Reviewed With  patient;spouse  (Pended)   -AP     Row Name 11/01/19 0957          Vital Signs    Pre Systolic BP Rehab  124  (Pended)   -AP     Pre Treatment Diastolic BP  68  (Pended)   -AP     Pretreatment Heart Rate (beats/min)  104  (Pended)   -AP     Pre SpO2 (%)  94  (Pended)   -AP     O2 Delivery Pre Treatment  supplemental O2  (Pended)   -AP     Row Name 11/01/19 0957          Positioning and Restraints    Pre-Treatment Position  sitting in chair/recliner  (Pended)   -AP     Post Treatment Position  other  (Pended)  with transport, on stretcher  -AP     In Bed  with other staff;supine  (Pended)  with transport on stretcher  -AP       User Key  (r) = Recorded By, (t) = Taken By, (c) = Cosigned By    Initials Name Provider Type    Aury Vegas, PT Student PT Student        Outcome Measures     [facility] Name 11/01/19 1003          How much help from another person do you currently need...    Turning from your back to  your side while in flat bed without using bedrails?  3  (Pended)   -AP     Moving from lying on back to sitting on the side of a flat bed without bedrails?  2  (Pended)   -AP     Moving to and from a bed to a chair (including a wheelchair)?  3  (Pended)   -AP     Standing up from a chair using your arms (e.g., wheelchair, bedside chair)?  3  (Pended)   -AP     Climbing 3-5 steps with a railing?  3  (Pended)   -AP     To walk in hospital room?  3  (Pended)   -AP     AM-PAC 6 Clicks Score (PT)  17  (Pended)   -AP       User Key  (r) = Recorded By, (t) = Taken By, (c) = Cosigned By    Initials Name Provider Type    Aury Vegas, PT Student PT Student        Physical Therapy Education     Title: PT OT SLP Therapies (Done)     Topic: Physical Therapy (Done)     Point: Mobility training (Done)     Learning Progress Summary           Patient Acceptance, E, VU by AP at 11/1/2019  9:59 AM    Acceptance, E, VU by AP at 10/31/2019 10:21 AM    Acceptance, E, VU,NR by AP at 10/30/2019  9:04 AM   Family Acceptance, E, VU by AP at 11/1/2019  9:59 AM    Acceptance, E, VU by AP at 10/31/2019 10:21 AM                   Point: Home exercise program (Done)     Learning Progress Summary           Patient Acceptance, E, VU by AP at 11/1/2019  9:59 AM   Family Acceptance, E, VU by AP at 11/1/2019  9:59 AM                   Point: Body mechanics (Done)     Learning Progress Summary           Patient Acceptance, E, VU by AP at 11/1/2019  9:59 AM    Acceptance, E, VU by AP at 10/31/2019 10:21 AM    Acceptance, E, VU,NR by AP at 10/30/2019  9:04 AM   Family Acceptance, E, VU by AP at 11/1/2019  9:59 AM    Acceptance, E, VU by AP at 10/31/2019 10:21 AM                   Point: Precautions (Done)     Learning Progress Summary           Patient Acceptance, E, VU by AP at 11/1/2019  9:59 AM    Acceptance, E, VU by AP at 10/31/2019 10:21 AM    Acceptance, E, VU,NR by AP at 10/30/2019  9:04 AM   Family SILVINO Haynes VU by GINA at 11/1/2019  9:59  AM    Acceptance, E, VU by GINA at 10/31/2019 10:21 AM                               User Key     Initials Effective Dates Name Provider Type Discipline     09/04/19 -  Aury Martniez PT Student PT Student PT              PT Recommendation and Plan  Planned Therapy Interventions (PT Eval): balance training, bed mobility training, gait training, home exercise program, patient/family education, strengthening, transfer training, stair training  Outcome Summary/Treatment Plan (PT)  Anticipated Discharge Disposition (PT): home with assist  Plan of Care Reviewed With: (P) patient, spouse  Outcome Summary: (P) Pt is displaying great recovery and increasing his independence with ambulation and functional mobility. He is still very limited by SOA with all standing activity and needs to take multiple rest breaks in standing while walking in the halls and ascending steps. Pt was able to walk up and down 8 steps with continuous CGA this visit and reported moderate to severe SOA that resolved quickly. He reported wanting to continue therapy for one more visit in an attempt to improve his walking endurance and ascend/descend steps with therapists one more time.      Time Calculation:   PT Charges     Row Name 11/01/19 1004             Time Calculation    Start Time  0938  (Pended)   -AP      Stop Time  0952  (Pended)   -AP      Time Calculation (min)  14 min  (Pended)   -AP      PT Received On  11/01/19  (Pended)   -AP      PT - Next Appointment  11/02/19  (Pended)   -AP        User Key  (r) = Recorded By, (t) = Taken By, (c) = Cosigned By    Initials Name Provider Type    AP Aury Martinez PT Student PT Student        Therapy Charges for Today     Code Description Service Date Service Provider Modifiers Qty    03605849244 HC PT THER PROC EA 15 MIN 10/31/2019 Aury Martinez, PT Student GP 1    00249717656 HC PT THER SUPP EA 15 MIN 10/31/2019 MartinezAury tristan, PT Student GP 1    07330455868 HC PT THER PROC EA 15 MIN 11/1/2019 Martinez,  Aury PT Student GP 1          PT G-Codes  Outcome Measure Options: AM-PAC 6 Clicks Basic Mobility (PT)  AM-PAC 6 Clicks Score (PT): (P) 17    Aury Martinez PT Student  11/1/2019

## 2019-11-01 NOTE — PROGRESS NOTES
" LOS: 3 days   Patient Care Team:  Marjorie Danielle MD as PCP - General (Internal Medicine)  Brayden Win MD as Consulting Physician (Cardiology)    Chief Complaint: postop    Subjective:  Symptoms:  No shortness of breath or chest pain.    Pain:  He complains of pain that is mild.  He reports pain is improving.  Pain is well controlled.          Vital Signs  Temp:  [97.7 °F (36.5 °C)-99.3 °F (37.4 °C)] 98.2 °F (36.8 °C)  Heart Rate:  [] 98  Resp:  [16-18] 18  BP: (113-131)/(60-76) 124/68  Body mass index is 29.33 kg/m².    Intake/Output Summary (Last 24 hours) at 11/1/2019 0854  Last data filed at 11/1/2019 0821  Gross per 24 hour   Intake 310 ml   Output 1865 ml   Net -1555 ml     I/O this shift:  In: 310 [P.O.:310]  Out: -             10/29/19  0547 10/31/19  0642 11/01/19  0630   Weight: 93.7 kg (206 lb 9 oz) 94 kg (207 lb 3.2 oz) 92.7 kg (204 lb 6.4 oz)         Objective:  General Appearance:  Comfortable, well-appearing, in no acute distress and not in pain.    Vital signs: (most recent): Blood pressure 124/68, pulse 98, temperature 98.2 °F (36.8 °C), temperature source Oral, resp. rate 18, height 177.8 cm (70\"), weight 92.7 kg (204 lb 6.4 oz), SpO2 96 %.  Vital signs are normal.  No fever.    Output: Producing urine.  Stool output assessment: passing flatualence.    Lungs:  Normal effort and normal respiratory rate.  He is not in respiratory distress.  No decreased breath sounds.    Heart: Tachycardia.  Regular rhythm.  S1 normal and S2 normal.  No murmur.   Abdomen: Abdomen is soft and non-distended.  Bowel sounds are normal.   There is no abdominal tenderness.     Extremities: Normal range of motion.    Neurological: Patient is alert and oriented to person, place and time.    Pupils:  Pupils are equal, round, and reactive to light.    Skin:  Warm and dry.  (Dressings clean, dry, and intact  )            Results Review:        WBC WBC   Date Value Ref Range Status   11/01/2019 7.91 3.40 - " 10.80 10*3/mm3 Final   10/31/2019 10.11 3.40 - 10.80 10*3/mm3 Final   10/30/2019 10.53 3.40 - 10.80 10*3/mm3 Final   10/29/2019 8.44 3.40 - 10.80 10*3/mm3 Final   10/29/2019 10.33 3.40 - 10.80 10*3/mm3 Final   10/29/2019 12.11 (H) 3.40 - 10.80 10*3/mm3 Final      HGB Hemoglobin   Date Value Ref Range Status   11/01/2019 9.4 (L) 13.0 - 17.7 g/dL Final   10/31/2019 9.0 (L) 13.0 - 17.7 g/dL Final   10/30/2019 8.4 (L) 13.0 - 17.7 g/dL Final   10/29/2019 10.3 (L) 13.0 - 17.7 g/dL Final   10/29/2019 11.5 (L) 13.0 - 17.7 g/dL Final   10/29/2019 9.5 (L) 12.0 - 17.0 g/dL Final   10/29/2019 10.0 (L) 13.0 - 17.7 g/dL Final   10/29/2019 10.2 (L) 12.0 - 17.0 g/dL Final   10/29/2019 10.2 (L) 12.0 - 17.0 g/dL Final   10/29/2019 9.9 (L) 12.0 - 17.0 g/dL Final      HCT Hematocrit   Date Value Ref Range Status   11/01/2019 28.9 (L) 37.5 - 51.0 % Final   10/31/2019 27.5 (L) 37.5 - 51.0 % Final   10/30/2019 25.5 (L) 37.5 - 51.0 % Final   10/29/2019 30.6 (L) 37.5 - 51.0 % Final   10/29/2019 34.6 (L) 37.5 - 51.0 % Final   10/29/2019 28 (L) 38 - 51 % Final   10/29/2019 30.2 (L) 37.5 - 51.0 % Final   10/29/2019 30 (L) 38 - 51 % Final   10/29/2019 30 (L) 38 - 51 % Final   10/29/2019 29 (L) 38 - 51 % Final      Platelets Platelets   Date Value Ref Range Status   11/01/2019 144 140 - 450 10*3/mm3 Final   10/31/2019 120 (L) 140 - 450 10*3/mm3 Final   10/30/2019 115 (L) 140 - 450 10*3/mm3 Final   10/29/2019 134 (L) 140 - 450 10*3/mm3 Final   10/29/2019 149 140 - 450 10*3/mm3 Final   10/29/2019 184 140 - 450 10*3/mm3 Final        PT/INR:    Protime   Date Value Ref Range Status   10/30/2019 15.5 (H) 11.7 - 14.2 Seconds Final   10/29/2019 16.9 (H) 11.7 - 14.2 Seconds Final   10/29/2019 24.0 (H) 12.8 - 15.2 seconds Final     Comment:     Serial Number: [id number]:  [id number]   10/29/2019 19.5 (H) 11.7 - 14.2 Seconds Final   /  INR   Date Value Ref Range Status   10/30/2019 1.26 (H) 0.90 - 1.10 Final   10/29/2019 1.41 (H) 0.90 - 1.10 Final    10/29/2019 2.1 (H) 0.8 - 1.2 Final   10/29/2019 1.68 (H) 0.90 - 1.10 Final       Sodium Sodium   Date Value Ref Range Status   11/01/2019 142 136 - 145 mmol/L Final   10/31/2019 142 136 - 145 mmol/L Final   10/30/2019 146 (H) 136 - 145 mmol/L Final   10/29/2019 146 (H) 136 - 145 mmol/L Final   10/29/2019 145 136 - 145 mmol/L Final      Potassium Potassium   Date Value Ref Range Status   11/01/2019 4.3 3.5 - 5.2 mmol/L Final   10/31/2019 4.0 3.5 - 5.2 mmol/L Final   10/30/2019 3.8 3.5 - 5.2 mmol/L Final   10/30/2019 3.6 3.5 - 5.2 mmol/L Final   10/29/2019 4.0 3.5 - 5.2 mmol/L Final   10/29/2019 4.0 3.5 - 5.2 mmol/L Final   10/29/2019 3.8 3.5 - 5.2 mmol/L Final      Chloride Chloride   Date Value Ref Range Status   11/01/2019 103 98 - 107 mmol/L Final   10/31/2019 104 98 - 107 mmol/L Final   10/30/2019 111 (H) 98 - 107 mmol/L Final   10/29/2019 112 (H) 98 - 107 mmol/L Final   10/29/2019 108 (H) 98 - 107 mmol/L Final      Bicarbonate CO2   Date Value Ref Range Status   11/01/2019 27.6 22.0 - 29.0 mmol/L Final   10/31/2019 27.8 22.0 - 29.0 mmol/L Final   10/30/2019 23.5 22.0 - 29.0 mmol/L Final   10/29/2019 24.8 22.0 - 29.0 mmol/L Final   10/29/2019 24.2 22.0 - 29.0 mmol/L Final      BUN BUN   Date Value Ref Range Status   11/01/2019 17 6 - 20 mg/dL Final   10/31/2019 18 6 - 20 mg/dL Final   10/30/2019 14 6 - 20 mg/dL Final   10/29/2019 16 6 - 20 mg/dL Final   10/29/2019 15 6 - 20 mg/dL Final      Creatinine Creatinine   Date Value Ref Range Status   11/01/2019 1.01 0.76 - 1.27 mg/dL Final   10/31/2019 1.16 0.76 - 1.27 mg/dL Final   10/30/2019 1.07 0.76 - 1.27 mg/dL Final   10/29/2019 1.29 (H) 0.76 - 1.27 mg/dL Final   10/29/2019 1.27 0.76 - 1.27 mg/dL Final      Calcium Calcium   Date Value Ref Range Status   11/01/2019 9.0 8.6 - 10.5 mg/dL Final   10/31/2019 8.9 8.6 - 10.5 mg/dL Final   10/30/2019 7.7 (L) 8.6 - 10.5 mg/dL Final   10/29/2019 7.7 (L) 8.6 - 10.5 mg/dL Final   10/29/2019 7.7 (L) 8.6 - 10.5 mg/dL Final       Magnesium Magnesium   Date Value Ref Range Status   10/30/2019 2.6 1.6 - 2.6 mg/dL Final   10/29/2019 2.8 (H) 1.6 - 2.6 mg/dL Final   10/29/2019 3.5 (H) 1.6 - 2.6 mg/dL Final            amiodarone 400 mg Oral Q12H   aspirin 81 mg Oral Daily   atorvastatin 10 mg Oral Nightly   enoxaparin 40 mg Subcutaneous Daily   furosemide 40 mg Oral Daily   gabapentin 100 mg Oral Q12H   guaiFENesin 1,200 mg Oral Q12H   insulin lispro 0-9 Units Subcutaneous 4x Daily With Meals & Nightly   metoprolol tartrate 25 mg Oral Q12H   mupirocin  Each Nare BID   pantoprazole 40 mg Oral Q AM   potassium chloride 20 mEq Oral Daily   senna-docusate sodium 2 tablet Oral Nightly       sodium chloride 30 mL/hr Last Rate: 30 mL/hr (10/29/19 1153)   sodium chloride 30 mL/hr            Patient Active Problem List   Diagnosis Code   • Ascending aortic aneurysm (CMS/[facility]) I71.2   • Obesity (BMI 30-39.9) E66.9   • Aortic stenosis, severe I35.0   • ZENON (obstructive sleep apnea) G47.33   • HTN (hypertension), benign I10   • Bicuspid aortic valve Q23.1   • Abnormal EKG R94.31   • Type 2 diabetes mellitus without complication, without long-term current use of insulin (CMS/[facility]) E11.9   • Nonrheumatic aortic valve stenosis I35.0   • Thoracic aortic aneurysm without rupture (CMS/[facility]) I71.2       Assessment & Plan    -Severe AS, ascending aortic aneurysm--s/p AVR, aneurysm repair --POD#3 Pagni  -obesity  -hypertension  -ZENON  -Diabetes  -post op anemia-expected acute blood loss, hgb improving, monitor   -TCP-plt count 144- improved       Isolate and tape epi wires.  Restart home beta blocker.   Increase activity.  Give additional IV diureses  Encourage pulmonary toilet.  Wean O2 as tolerated  Progressing well.   Anticipate home with home health in the next few days.    Omaira Mike, KAIT  11/01/19  8:54 AM

## 2019-11-02 LAB
ANION GAP SERPL CALCULATED.3IONS-SCNC: 12.2 MMOL/L (ref 5–15)
BUN BLD-MCNC: 17 MG/DL (ref 6–20)
BUN/CREAT SERPL: 17.7 (ref 7–25)
CALCIUM SPEC-SCNC: 9.1 MG/DL (ref 8.6–10.5)
CHLORIDE SERPL-SCNC: 103 MMOL/L (ref 98–107)
CO2 SERPL-SCNC: 25.8 MMOL/L (ref 22–29)
CREAT BLD-MCNC: 0.96 MG/DL (ref 0.76–1.27)
DEPRECATED RDW RBC AUTO: 40.4 FL (ref 37–54)
ERYTHROCYTE [DISTWIDTH] IN BLOOD BY AUTOMATED COUNT: 11.9 % (ref 12.3–15.4)
GFR SERPL CREATININE-BSD FRML MDRD: 82 ML/MIN/1.73
GLUCOSE BLD-MCNC: 113 MG/DL (ref 65–99)
GLUCOSE BLDC GLUCOMTR-MCNC: 103 MG/DL (ref 70–130)
GLUCOSE BLDC GLUCOMTR-MCNC: 124 MG/DL (ref 70–130)
GLUCOSE BLDC GLUCOMTR-MCNC: 132 MG/DL (ref 70–130)
GLUCOSE BLDC GLUCOMTR-MCNC: 168 MG/DL (ref 70–130)
HCT VFR BLD AUTO: 30.6 % (ref 37.5–51)
HGB BLD-MCNC: 10.1 G/DL (ref 13–17.7)
MCH RBC QN AUTO: 30.2 PG (ref 26.6–33)
MCHC RBC AUTO-ENTMCNC: 33 G/DL (ref 31.5–35.7)
MCV RBC AUTO: 91.6 FL (ref 79–97)
PLATELET # BLD AUTO: 200 10*3/MM3 (ref 140–450)
PMV BLD AUTO: 9.6 FL (ref 6–12)
POTASSIUM BLD-SCNC: 4.1 MMOL/L (ref 3.5–5.2)
RBC # BLD AUTO: 3.34 10*6/MM3 (ref 4.14–5.8)
SODIUM BLD-SCNC: 141 MMOL/L (ref 136–145)
WBC NRBC COR # BLD: 7.37 10*3/MM3 (ref 3.4–10.8)

## 2019-11-02 PROCEDURE — 25010000002 ENOXAPARIN PER 10 MG: Performed by: THORACIC SURGERY (CARDIOTHORACIC VASCULAR SURGERY)

## 2019-11-02 PROCEDURE — 99024 POSTOP FOLLOW-UP VISIT: CPT | Performed by: NURSE PRACTITIONER

## 2019-11-02 PROCEDURE — 85027 COMPLETE CBC AUTOMATED: CPT | Performed by: THORACIC SURGERY (CARDIOTHORACIC VASCULAR SURGERY)

## 2019-11-02 PROCEDURE — 82962 GLUCOSE BLOOD TEST: CPT

## 2019-11-02 PROCEDURE — 80048 BASIC METABOLIC PNL TOTAL CA: CPT | Performed by: THORACIC SURGERY (CARDIOTHORACIC VASCULAR SURGERY)

## 2019-11-02 PROCEDURE — 97110 THERAPEUTIC EXERCISES: CPT | Performed by: PHYSICAL THERAPIST

## 2019-11-02 PROCEDURE — 63710000001 INSULIN LISPRO (HUMAN) PER 5 UNITS: Performed by: THORACIC SURGERY (CARDIOTHORACIC VASCULAR SURGERY)

## 2019-11-02 PROCEDURE — 94799 UNLISTED PULMONARY SVC/PX: CPT

## 2019-11-02 RX ORDER — LACTULOSE 10 G/15ML
30 SOLUTION ORAL ONCE
Status: COMPLETED | OUTPATIENT
Start: 2019-11-02 | End: 2019-11-02

## 2019-11-02 RX ADMIN — ATORVASTATIN CALCIUM 10 MG: 10 TABLET, FILM COATED ORAL at 21:06

## 2019-11-02 RX ADMIN — INSULIN LISPRO 2 UNITS: 100 INJECTION, SOLUTION INTRAVENOUS; SUBCUTANEOUS at 11:46

## 2019-11-02 RX ADMIN — GUAIFENESIN 1200 MG: 600 TABLET, EXTENDED RELEASE ORAL at 21:06

## 2019-11-02 RX ADMIN — ASPIRIN 81 MG: 81 TABLET, COATED ORAL at 09:53

## 2019-11-02 RX ADMIN — AMIODARONE HYDROCHLORIDE 400 MG: 200 TABLET ORAL at 09:53

## 2019-11-02 RX ADMIN — LACTULOSE 30 G: 10 SOLUTION ORAL at 15:30

## 2019-11-02 RX ADMIN — HYDROCODONE BITARTRATE AND ACETAMINOPHEN 2 TABLET: 5; 325 TABLET ORAL at 12:22

## 2019-11-02 RX ADMIN — GUAIFENESIN 1200 MG: 600 TABLET, EXTENDED RELEASE ORAL at 09:52

## 2019-11-02 RX ADMIN — POLYETHYLENE GLYCOL 3350 17 G: 17 POWDER, FOR SOLUTION ORAL at 09:54

## 2019-11-02 RX ADMIN — ACETAMINOPHEN 650 MG: 325 TABLET, FILM COATED ORAL at 09:54

## 2019-11-02 RX ADMIN — AMIODARONE HYDROCHLORIDE 400 MG: 200 TABLET ORAL at 21:06

## 2019-11-02 RX ADMIN — PANTOPRAZOLE SODIUM 40 MG: 40 TABLET, DELAYED RELEASE ORAL at 05:43

## 2019-11-02 RX ADMIN — PROPRANOLOL HYDROCHLORIDE 10 MG: 10 TABLET ORAL at 14:04

## 2019-11-02 RX ADMIN — FUROSEMIDE 40 MG: 40 TABLET ORAL at 09:52

## 2019-11-02 RX ADMIN — MUPIROCIN 1 APPLICATION: 20 OINTMENT TOPICAL at 09:54

## 2019-11-02 RX ADMIN — CYCLOBENZAPRINE 10 MG: 10 TABLET, FILM COATED ORAL at 21:06

## 2019-11-02 RX ADMIN — HYDROCODONE BITARTRATE AND ACETAMINOPHEN 2 TABLET: 5; 325 TABLET ORAL at 05:42

## 2019-11-02 RX ADMIN — PROPRANOLOL HYDROCHLORIDE 10 MG: 10 TABLET ORAL at 22:25

## 2019-11-02 RX ADMIN — ENOXAPARIN SODIUM 40 MG: 40 INJECTION SUBCUTANEOUS at 19:00

## 2019-11-02 RX ADMIN — POTASSIUM CHLORIDE 20 MEQ: 750 CAPSULE, EXTENDED RELEASE ORAL at 09:53

## 2019-11-02 RX ADMIN — MUPIROCIN 1 APPLICATION: 20 OINTMENT TOPICAL at 21:07

## 2019-11-02 RX ADMIN — PROPRANOLOL HYDROCHLORIDE 10 MG: 10 TABLET ORAL at 05:43

## 2019-11-02 NOTE — THERAPY TREATMENT NOTE
Patient Name: Brigido Caraballo  : 1965    MRN: [id number]                              Today's Date: 2019       Admit Date: 10/29/2019    Visit Dx:     ICD-10-CM ICD-9-CM   1. Thoracic aortic aneurysm without rupture (CMS/HCC) I71.2 441.2     Patient Active Problem List   Diagnosis   • Ascending aortic aneurysm (CMS/HCC)   • Obesity (BMI 30-39.9)   • Aortic stenosis, severe   • ZENON (obstructive sleep apnea)   • HTN (hypertension), benign   • Bicuspid aortic valve   • Abnormal EKG   • Type 2 diabetes mellitus without complication, without long-term current use of insulin (CMS/HCC)   • Nonrheumatic aortic valve stenosis   • Thoracic aortic aneurysm without rupture (CMS/HCC)     Past Medical History:   Diagnosis Date   • Abnormal EKG     Baseline anterolateral T wave abnormalities    • Aortic aneurysm (CMS/HCC)    • Aortic stenosis    • Arthritis     THUMBS   • Bicuspid aortic valve    • Diabetes (CMS/HCC)    • Heart murmur    • Hyperlipidemia    • Hypertension    • ZENON (obstructive sleep apnea)     Intolerant to CPAP  TESTED 20 YRS AGO  NO PROBLEMS NOW USES  NOSE STRIP AT NIGHT     Past Surgical History:   Procedure Laterality Date   • ASCENDING ARCH/HEMIARCH REPLACEMENT N/A 10/29/2019    Procedure: INTRAOPERATIVE RICHARD; STERNOTOMY THORACIC AORTIC ANEURYSM ASCENDING ARCH REPAIR WITH CIRCULATORY ARREST; AORTIC VALVE REPLACEMENT AND PRP.;  Surgeon: Felipe Shell MD;  Location: [facility] OR;  Service: Cardiothoracic   • CARDIAC CATHETERIZATION N/A 2019    Procedure: Left Heart Cath (pre-op aortic valve replacement and aortic aneurysm repair);  Surgeon: Crow Ely MD;  Location: [facility] INVASIVE LOCATION;  Service: Cardiovascular     General Information     Row Name 19 0909          PT Evaluation Time/Intention    Document Type  discharge treatment  -KH     Mode of Treatment  individual therapy;physical therapy  -KH       User Key  (r) = Recorded By, (t) = Taken By, (c) = Cosigned  By    Initials Name Provider Type    Michelle Ralph, PT Physical Therapist        Mobility     Row Name 11/02/19 0909          Bed Mobility Assessment/Treatment    [place] Level (Bed Mobility)  contact guard assist  -     Assistive Device (Bed Mobility)  head of bed elevated  -     Row Name 11/02/19 0909          Sit-Stand Transfer    Sit-Stand [place] (Transfers)  supervision  -     Row Name 11/02/19 0909          Gait/Stairs Assessment/Training    [place] Level (Gait)  supervision  -     Distance in Feet (Gait)  200  -     Deviations/Abnormal Patterns (Gait)  gait speed decreased  -     [place] Level (Stairs)  supervision  -     Handrail Location (Stairs)  right side (ascending)  -     Number of Steps (Stairs)  8  -     Ascending Technique (Stairs)  step-over-step  -     Descending Technique (Stairs)  step-to-step  -       User Key  (r) = Recorded By, (t) = Taken By, (c) = Cosigned By    Initials Name Provider Type    Michelle Ralph, FRED Physical Therapist        Obj/Interventions     Row Name 11/02/19 0910          Therapeutic Exercise    Comment (Therapeutic Exercise)  10 reps cardiac protocol  -       User Key  (r) = Recorded By, (t) = Taken By, (c) = Cosigned By    Initials Name Provider Type    Michelle Ralph, PT Physical Therapist        Goals/Plan     Row Name 11/02/19 0911          Patient Education Goal (PT)    Progress/Outcome (Patient Education Goal, PT)  goal met  -       User Key  (r) = Recorded By, (t) = Taken By, (c) = Cosigned By    Initials Name Provider Type    Michelle Ralph, PT Physical Therapist        Clinical Impression     Row Name 11/02/19 0910          Pain Assessment    Additional Documentation  Pain Scale: Numbers Pre/Post-Treatment (Group)  -     Row Name 11/02/19 0910          Pain Scale: Numbers Pre/Post-Treatment    Pain Scale: Numbers, Pretreatment  0/10 - no pain  -     Row Name  11/02/19 0910          Plan of Care Review    Plan of Care Reviewed With  patient  -NIRMALA     Row Name 11/02/19 0910          Positioning and Restraints    Pre-Treatment Position  in bed  -KH     Post Treatment Position  bed  -KH     In Bed  fowlers;call light within reach;encouraged to call for assist  -       User Key  (r) = Recorded By, (t) = Taken By, (c) = Cosigned By    Initials Name Provider Type    Michelle Ralph, PT Physical Therapist        Outcome Measures     Row Name 11/02/19 0913          How much help from another person do you currently need...    Turning from your back to your side while in flat bed without using bedrails?  4  -KH     Moving from lying on back to sitting on the side of a flat bed without bedrails?  3  -KH     Moving to and from a bed to a chair (including a wheelchair)?  4  -KH     Standing up from a chair using your arms (e.g., wheelchair, bedside chair)?  4  -KH     Climbing 3-5 steps with a railing?  3  -KH     To walk in hospital room?  4  -KH     AM-PAC 6 Clicks Score (PT)  22  -NIRMALA     Row Name 11/02/19 0913          Functional Assessment    Outcome Measure Options  AM-PAC 6 Clicks Basic Mobility (PT)  -       User Key  (r) = Recorded By, (t) = Taken By, (c) = Cosigned By    Initials Name Provider Type    Michelle Ralph, PT Physical Therapist        Physical Therapy Education     Title: PT OT SLP Therapies (Resolved)     Topic: Physical Therapy (Resolved)     Point: Mobility training (Resolved)     Learning Progress Summary           Patient Acceptance, E, VU,DU by NIRMALA at 11/2/2019  9:11 AM    Acceptance, E, VU by GINA at 11/1/2019  9:59 AM    Acceptance, E, VU by GINA at 10/31/2019 10:21 AM    Acceptance, E, VU,NR by GINA at 10/30/2019  9:04 AM   Family Acceptance, E, VU by GINA at 11/1/2019  9:59 AM    Acceptance, E, VU by GINA at 10/31/2019 10:21 AM                   Point: Home exercise program (Resolved)     Learning Progress Summary           Patient  Acceptance, E, VU,DU by  at 11/2/2019  9:11 AM    Acceptance, E, VU by AP at 11/1/2019  9:59 AM   Family Acceptance, E, VU by AP at 11/1/2019  9:59 AM                   Point: Body mechanics (Resolved)     Learning Progress Summary           Patient Acceptance, E, VU,DU by  at 11/2/2019  9:11 AM    Acceptance, E, VU by AP at 11/1/2019  9:59 AM    Acceptance, E, VU by AP at 10/31/2019 10:21 AM    Acceptance, E, VU,NR by AP at 10/30/2019  9:04 AM   Family Acceptance, E, VU by AP at 11/1/2019  9:59 AM    Acceptance, E, VU by AP at 10/31/2019 10:21 AM                   Point: Precautions (Resolved)     Learning Progress Summary           Patient Acceptance, E, VU,DU by  at 11/2/2019  9:11 AM    Acceptance, E, VU by AP at 11/1/2019  9:59 AM    Acceptance, E, VU by AP at 10/31/2019 10:21 AM    Acceptance, E, VU,NR by AP at 10/30/2019  9:04 AM   Family Acceptance, E, VU by AP at 11/1/2019  9:59 AM    Acceptance, E, VU by AP at 10/31/2019 10:21 AM                               User Key     Initials Effective Dates Name Provider Type [facility] 02/05/19 -  Michelle Quan, PT Physical Therapist PT     09/04/19 -  Aury Martinez, PT Student PT Student PT              PT Recommendation and Plan     Outcome Summary/Treatment Plan (PT)  Anticipated Discharge Disposition (PT): home with assist  Plan of Care Reviewed With: patient  Outcome Summary: Pt is progressing well. No SOA noted during today's treatment and no c/o pain. Pt is safe to d/c home when medically stable. Encouraged pt to continue ambulating with nursing or family. PT will sign off.      Time Calculation:   PT Charges     Row Name 11/02/19 0908             Time Calculation    Start Time  0900  -      Stop Time  0909  -      Time Calculation (min)  9 min  -      PT Received On  11/02/19  -      PT - Next Appointment  11/03/19  -        User Key  (r) = Recorded By, (t) = Taken By, (c) = Cosigned By    Initials Name Provider Type      Michelle Quan, PT Physical Therapist        Therapy Charges for Today     Code Description Service Date Service Provider Modifiers Qty    01039052160 HC PT THER PROC EA 15 MIN 11/2/2019 Michelle Quan, PT GP 1          PT G-Codes  Outcome Measure Options: AM-PAC 6 Clicks Basic Mobility (PT)  AM-PAC 6 Clicks Score (PT): 22    Michelle Quan, PT  11/2/2019

## 2019-11-02 NOTE — PLAN OF CARE
Problem: Patient Care Overview  Goal: Plan of Care Review   11/02/19 0912   OTHER   Outcome Summary Pt is progressing well. No SOA noted during today's treatment and no c/o pain. Pt is safe to d/c home when medically stable. Encouraged pt to continue ambulating with nursing or family. PT will sign off.

## 2019-11-02 NOTE — PLAN OF CARE
Problem: Patient Care Overview  Goal: Plan of Care Review  Outcome: Ongoing (interventions implemented as appropriate)   11/02/19 1109   Coping/Psychosocial   Plan of Care Reviewed With patient;spouse   OTHER   Outcome Summary Pt sitting up in chair. Some incisional pain. Administered prn tylenol. Plan is to remove wires today, awaiting order. Pt states no bm since 10/29/19. Adminstered scheduled miralax. Will continue to monitor. Pt ambulating with asstx1 to BR. Will continue to monitor pt.        Problem: Fall Risk (Adult)  Goal: Absence of Fall  Outcome: Ongoing (interventions implemented as appropriate)   11/02/19 1109   Fall Risk (Adult)   Absence of Fall achieves outcome      11/02/19 1109   Fall Risk (Adult)   Absence of Fall achieves outcome  (Continues to be free of fall. Repeated education.)

## 2019-11-02 NOTE — PROGRESS NOTES
" LOS: 4 days   Patient Care Team:  Marjorie Danielle MD as PCP - General (Internal Medicine)  Brayden Win MD as Consulting Physician (Cardiology)    Chief Complaint: postop    Patient up to chair. No complaints. Now on room air    Vital Signs  Temp:  [97.3 °F (36.3 °C)-100.2 °F (37.9 °C)] 97.3 °F (36.3 °C)  Heart Rate:  [] 89  Resp:  [16-20] 16  BP: (120-139)/(66-82) 122/66  Body mass index is 29.01 kg/m².    Intake/Output Summary (Last 24 hours) at 11/2/2019 1128  Last data filed at 11/2/2019 0320  Gross per 24 hour   Intake 1040 ml   Output 1650 ml   Net -610 ml     No intake/output data recorded.            10/31/19  0642 11/01/19  0630 11/02/19  0535   Weight: 94 kg (207 lb 3.2 oz) 92.7 kg (204 lb 6.4 oz) 91.7 kg (202 lb 3.2 oz)         Objective:  General Appearance:  Comfortable, well-appearing, in no acute distress and not in pain.    Vital signs: (most recent): Blood pressure 122/66, pulse 89, temperature 97.3 °F (36.3 °C), temperature source Oral, resp. rate 16, height 177.8 cm (70\"), weight 91.7 kg (202 lb 3.2 oz), SpO2 94 %.  Vital signs are normal.  No fever.    Output: Producing urine.  Stool output assessment: passing flatualence.    Lungs:  Normal effort and normal respiratory rate.  He is not in respiratory distress.  No decreased breath sounds.    Heart: Tachycardia.  Regular rhythm.  S1 normal and S2 normal.  No murmur.   Abdomen: Abdomen is soft and non-distended.  Bowel sounds are normal.   There is no abdominal tenderness.     Extremities: Normal range of motion.    Neurological: Patient is alert and oriented to person, place and time.    Pupils:  Pupils are equal, round, and reactive to light.    Skin:  Warm and dry.  (Dressings clean, dry, and intact  )            Results Review:        WBC WBC   Date Value Ref Range Status   11/02/2019 7.37 3.40 - 10.80 10*3/mm3 Final   11/01/2019 7.91 3.40 - 10.80 10*3/mm3 Final   10/31/2019 10.11 3.40 - 10.80 10*3/mm3 Final      HGB " Hemoglobin   Date Value Ref Range Status   11/02/2019 10.1 (L) 13.0 - 17.7 g/dL Final   11/01/2019 9.4 (L) 13.0 - 17.7 g/dL Final   10/31/2019 9.0 (L) 13.0 - 17.7 g/dL Final      HCT Hematocrit   Date Value Ref Range Status   11/02/2019 30.6 (L) 37.5 - 51.0 % Final   11/01/2019 28.9 (L) 37.5 - 51.0 % Final   10/31/2019 27.5 (L) 37.5 - 51.0 % Final      Platelets Platelets   Date Value Ref Range Status   11/02/2019 200 140 - 450 10*3/mm3 Final   11/01/2019 144 140 - 450 10*3/mm3 Final   10/31/2019 120 (L) 140 - 450 10*3/mm3 Final        PT/INR:    No results found for: PROTIME/  No results found for: INR    Sodium Sodium   Date Value Ref Range Status   11/02/2019 141 136 - 145 mmol/L Final   11/01/2019 142 136 - 145 mmol/L Final   10/31/2019 142 136 - 145 mmol/L Final      Potassium Potassium   Date Value Ref Range Status   11/02/2019 4.1 3.5 - 5.2 mmol/L Final   11/01/2019 4.3 3.5 - 5.2 mmol/L Final   10/31/2019 4.0 3.5 - 5.2 mmol/L Final      Chloride Chloride   Date Value Ref Range Status   11/02/2019 103 98 - 107 mmol/L Final   11/01/2019 103 98 - 107 mmol/L Final   10/31/2019 104 98 - 107 mmol/L Final      Bicarbonate CO2   Date Value Ref Range Status   11/02/2019 25.8 22.0 - 29.0 mmol/L Final   11/01/2019 27.6 22.0 - 29.0 mmol/L Final   10/31/2019 27.8 22.0 - 29.0 mmol/L Final      BUN BUN   Date Value Ref Range Status   11/02/2019 17 6 - 20 mg/dL Final   11/01/2019 17 6 - 20 mg/dL Final   10/31/2019 18 6 - 20 mg/dL Final      Creatinine Creatinine   Date Value Ref Range Status   11/02/2019 0.96 0.76 - 1.27 mg/dL Final   11/01/2019 1.01 0.76 - 1.27 mg/dL Final   10/31/2019 1.16 0.76 - 1.27 mg/dL Final      Calcium Calcium   Date Value Ref Range Status   11/02/2019 9.1 8.6 - 10.5 mg/dL Final   11/01/2019 9.0 8.6 - 10.5 mg/dL Final   10/31/2019 8.9 8.6 - 10.5 mg/dL Final      Magnesium No results found for: MG         amiodarone 400 mg Oral Q12H   aspirin 81 mg Oral Daily   atorvastatin 10 mg Oral Nightly    enoxaparin 40 mg Subcutaneous Daily   furosemide 40 mg Oral Daily   guaiFENesin 1,200 mg Oral Q12H   insulin lispro 0-9 Units Subcutaneous 4x Daily With Meals & Nightly   mupirocin  Each Nare BID   pantoprazole 40 mg Oral Q AM   polyethylene glycol 17 g Oral Daily   potassium chloride 20 mEq Oral Daily   propranolol 10 mg Oral Q8H   senna-docusate sodium 2 tablet Oral Nightly       sodium chloride 30 mL/hr Last Rate: 30 mL/hr (10/29/19 1153)   sodium chloride 30 mL/hr            Patient Active Problem List   Diagnosis Code   • Ascending aortic aneurysm (CMS/HCC) I71.2   • Obesity (BMI 30-39.9) E66.9   • Aortic stenosis, severe I35.0   • ZENON (obstructive sleep apnea) G47.33   • HTN (hypertension), benign I10   • Bicuspid aortic valve Q23.1   • Abnormal EKG R94.31   • Type 2 diabetes mellitus without complication, without long-term current use of insulin (CMS/[facility]) E11.9   • Nonrheumatic aortic valve stenosis I35.0   • Thoracic aortic aneurysm without rupture (CMS/[facility]) I71.2       Assessment & Plan    -Severe AS, ascending aortic aneurysm--s/p AVR, aneurysm repair --POD#4 Pagni  -obesity  -hypertension  -ZENON  -Diabetes  -post op anemia-expected acute blood loss, hgb improving, monitor   -TCP-plt count 144- improved       Progressing well.   Dc wires  Increase bowel regimen  Tentative discharge to home tomorrow    REBEKAH BOATENG, KAIT  11/02/19  11:28 AM

## 2019-11-02 NOTE — PLAN OF CARE
Problem: Health Knowledge, Opportunity to Enhance (Adult,Obstetrics,Pediatric)  Goal: Knowledgeable about Health Subject/Topic  Outcome: Ongoing (interventions implemented as appropriate)   11/02/19 1112   Health Knowledge, Opportunity to Enhance (Adult,Obstetrics,Pediatric)   Knowledgeable about Health Subject/Topic making progress toward outcome  (Provided education on what to expect when wires are pulled l)

## 2019-11-02 NOTE — PROGRESS NOTES
"[facility]    Patient Name: Brigido Caraballo  :1965  54 y.o.  LOS: 4  Encounter Provider: Lori Rose MD      Patient Care Team:  Marjorie Danielle MD as PCP - General (Internal Medicine)  Brayden Win MD as Consulting Physician (Cardiology)    Chief Complaint:  S/p AVR and ascending aneurysm repair    Interval History: No complaints    Objective   Vital Signs  Temp:  [97.3 °F (36.3 °C)-100.2 °F (37.9 °C)] 97.4 °F (36.3 °C)  Heart Rate:  [] 91  Resp:  [16-20] 16  BP: (120-139)/(66-82) 128/77    Intake/Output Summary (Last 24 hours) at 2019 1332  Last data filed at 2019 0320  Gross per 24 hour   Intake 640 ml   Output 1650 ml   Net -1010 ml     Flowsheet Rows      First Filed Value   Admission Height  177.8 cm (70\") Documented at 10/29/2019 0547   Admission Weight  93.7 kg (206 lb 9 oz) Documented at 10/29/2019 0547        GEN: no distress, alert and oriented  Eyes: normal sclerae, normal lids and lashes  HENT: moist mucous membranes,   Lungs: CTAB, no rales or wheezes  Chest: no abnormalities  Neck: no JVD or carotid bruits  CV: RRR, no murmurs, +2 DP and 2+ carotid pulses b/l  Abdomen: soft, nontender, nondistended  Extremities: no edema  Skin: no rash, warm, dry  Heme/Lymph: no bruising  Psych: organized thought, normal behavior and affect            Results Review:    Results from last 7 days   Lab Units 19  0331   SODIUM mmol/L 141   POTASSIUM mmol/L 4.1   CHLORIDE mmol/L 103   CO2 mmol/L 25.8   BUN mg/dL 17   CREATININE mg/dL 0.96   GLUCOSE mg/dL 113*   CALCIUM mg/dL 9.1         Results from last 7 days   Lab Units 19  0331   WBC 10*3/mm3 7.37   HEMOGLOBIN g/dL 10.1*   HEMATOCRIT % 30.6*   PLATELETS 10*3/mm3 200     Results from last 7 days   Lab Units 10/30/19  0300 10/29/19  1144 10/29/19  1024 10/29/19  1018   INR  1.26* 1.41* 2.1* 1.68*   APTT seconds  --  27.7  --  25.5     Results from last 7 days   Lab Units 10/30/19  0300   MAGNESIUM mg/dL " 2.6                   Medication Review:     amiodarone 400 mg Oral Q12H   aspirin 81 mg Oral Daily   atorvastatin 10 mg Oral Nightly   enoxaparin 40 mg Subcutaneous Daily   furosemide 40 mg Oral Daily   guaiFENesin 1,200 mg Oral Q12H   insulin lispro 0-9 Units Subcutaneous 4x Daily With Meals & Nightly   lactulose 30 g Oral Once   mupirocin  Each Nare BID   pantoprazole 40 mg Oral Q AM   polyethylene glycol 17 g Oral Daily   potassium chloride 20 mEq Oral Daily   propranolol 10 mg Oral Q8H   senna-docusate sodium 2 tablet Oral Nightly          sodium chloride 30 mL/hr Last Rate: 30 mL/hr (10/29/19 1153)   sodium chloride 30 mL/hr        Assessment/Plan   1. Severe aortic stenosis, ascending aortic aneurysm: s/p AVR, aneurysm repair 10/29  2. Hypertension  3. Obstructive sleep apnea  4. Diabetes  5. Postop anemia  6. Thrombocytopenia    No changes today. Ready for d/c likely tomorrow.     Lori Rose MD  [facility]  11/02/19  1:32 PM

## 2019-11-03 VITALS
OXYGEN SATURATION: 96 % | TEMPERATURE: 98.3 F | DIASTOLIC BLOOD PRESSURE: 64 MMHG | HEART RATE: 87 BPM | RESPIRATION RATE: 18 BRPM | SYSTOLIC BLOOD PRESSURE: 108 MMHG | BODY MASS INDEX: 28.63 KG/M2 | HEIGHT: 70 IN | WEIGHT: 200 LBS

## 2019-11-03 PROBLEM — Z98.890 S/P VENTRICULAR SEPTAL MYECTOMY: Status: ACTIVE | Noted: 2019-11-03

## 2019-11-03 PROBLEM — Z98.890 S/P ASCENDING AORTIC ANEURYSM REPAIR: Status: ACTIVE | Noted: 2019-11-03

## 2019-11-03 PROBLEM — Z95.2 S/P AVR (AORTIC VALVE REPLACEMENT): Status: ACTIVE | Noted: 2019-11-03

## 2019-11-03 PROBLEM — Z86.79 S/P ASCENDING AORTIC ANEURYSM REPAIR: Status: ACTIVE | Noted: 2019-11-03

## 2019-11-03 LAB
ABO + RH BLD: NORMAL
ABO + RH BLD: NORMAL
ANION GAP SERPL CALCULATED.3IONS-SCNC: 15.6 MMOL/L (ref 5–15)
BH BB BLOOD EXPIRATION DATE: NORMAL
BH BB BLOOD EXPIRATION DATE: NORMAL
BH BB BLOOD TYPE BARCODE: 9500
BH BB BLOOD TYPE BARCODE: 9500
BH BB DISPENSE STATUS: NORMAL
BH BB DISPENSE STATUS: NORMAL
BH BB PRODUCT CODE: NORMAL
BH BB PRODUCT CODE: NORMAL
BH BB UNIT NUMBER: NORMAL
BH BB UNIT NUMBER: NORMAL
BUN BLD-MCNC: 17 MG/DL (ref 6–20)
BUN/CREAT SERPL: 17.2 (ref 7–25)
CALCIUM SPEC-SCNC: 9.6 MG/DL (ref 8.6–10.5)
CHLORIDE SERPL-SCNC: 102 MMOL/L (ref 98–107)
CO2 SERPL-SCNC: 24.4 MMOL/L (ref 22–29)
CREAT BLD-MCNC: 0.99 MG/DL (ref 0.76–1.27)
CROSSMATCH INTERPRETATION: NORMAL
CROSSMATCH INTERPRETATION: NORMAL
GFR SERPL CREATININE-BSD FRML MDRD: 79 ML/MIN/1.73
GLUCOSE BLD-MCNC: 107 MG/DL (ref 65–99)
GLUCOSE BLDC GLUCOMTR-MCNC: 102 MG/DL (ref 70–130)
GLUCOSE BLDC GLUCOMTR-MCNC: 116 MG/DL (ref 70–130)
POTASSIUM BLD-SCNC: 4.8 MMOL/L (ref 3.5–5.2)
SODIUM BLD-SCNC: 142 MMOL/L (ref 136–145)
UNIT  ABO: NORMAL
UNIT  ABO: NORMAL
UNIT  RH: NORMAL
UNIT  RH: NORMAL

## 2019-11-03 PROCEDURE — 82962 GLUCOSE BLOOD TEST: CPT

## 2019-11-03 PROCEDURE — 80048 BASIC METABOLIC PNL TOTAL CA: CPT | Performed by: THORACIC SURGERY (CARDIOTHORACIC VASCULAR SURGERY)

## 2019-11-03 RX ORDER — FUROSEMIDE 40 MG/1
40 TABLET ORAL DAILY
Qty: 5 TABLET | Refills: 0 | Status: SHIPPED | OUTPATIENT
Start: 2019-11-04 | End: 2019-11-14

## 2019-11-03 RX ORDER — POTASSIUM CHLORIDE 750 MG/1
10 CAPSULE, EXTENDED RELEASE ORAL DAILY
Qty: 5 CAPSULE | Refills: 0 | Status: SHIPPED | OUTPATIENT
Start: 2019-11-04 | End: 2019-11-14

## 2019-11-03 RX ORDER — HYDROCODONE BITARTRATE AND ACETAMINOPHEN 5; 325 MG/1; MG/1
2 TABLET ORAL EVERY 4 HOURS PRN
Qty: 60 TABLET | Refills: 0 | Status: SHIPPED | OUTPATIENT
Start: 2019-11-03 | End: 2019-11-08

## 2019-11-03 RX ADMIN — POTASSIUM CHLORIDE 20 MEQ: 750 CAPSULE, EXTENDED RELEASE ORAL at 08:48

## 2019-11-03 RX ADMIN — PROPRANOLOL HYDROCHLORIDE 10 MG: 10 TABLET ORAL at 06:30

## 2019-11-03 RX ADMIN — PANTOPRAZOLE SODIUM 40 MG: 40 TABLET, DELAYED RELEASE ORAL at 06:30

## 2019-11-03 RX ADMIN — MUPIROCIN 1 APPLICATION: 20 OINTMENT TOPICAL at 08:49

## 2019-11-03 RX ADMIN — ASPIRIN 81 MG: 81 TABLET, COATED ORAL at 08:48

## 2019-11-03 RX ADMIN — HYDROCODONE BITARTRATE AND ACETAMINOPHEN 2 TABLET: 5; 325 TABLET ORAL at 01:03

## 2019-11-03 RX ADMIN — GUAIFENESIN 1200 MG: 600 TABLET, EXTENDED RELEASE ORAL at 08:48

## 2019-11-03 RX ADMIN — FUROSEMIDE 40 MG: 40 TABLET ORAL at 08:48

## 2019-11-03 RX ADMIN — AMIODARONE HYDROCHLORIDE 400 MG: 200 TABLET ORAL at 08:48

## 2019-11-03 RX ADMIN — ACETAMINOPHEN 650 MG: 325 TABLET, FILM COATED ORAL at 08:49

## 2019-11-03 NOTE — DISCHARGE SUMMARY
Date of Admission: 10/29/2019  Date of Discharge:  11/3/2019    Discharge Diagnosis:   Ascending and arch aortic aneurysm, Bicuspid aortopathy, Bicuspid aortic valve with severe stenosis, Asymmetric septal hypertrophy S/p AVR/ Proximal arch repair/ Ascending aortic aneurysm repair/Septal Myectomy    Presenting Problem/History of Present Illness  Thoracic aortic aneurysm without rupture (CMS/HCC) [I71.2]  Thoracic aortic aneurysm without rupture (CMS/HCC) [I71.2]     Hospital Course  Patient is a 54 y.o. male presented for scheduled AVR/ Proximal arch repair/ Ascending aortic aneurysm repair/Septal Myectomy with Dr. Shell on 10/29/19. He was transferred to the recovery unit in stable condition and extubated shortly after. He has recovered very well and experienced no postop complications. He passed a nocturnal oximetry prior to discharge and will require no home oxygen. He is being discharged to home with family and home health in stable condition.      Procedures Performed  Procedure(s):  10/29/19-- INTRAOPERATIVE RICHARD; STERNOTOMY THORACIC AORTIC ANEURYSM ASCENDING ARCH REPAIR WITH CIRCULATORY ARREST; AORTIC VALVE REPLACEMENT AND PRP.       Consults:   Consults     Date and Time Order Name Status Description    11/1/2019 1537 Inpatient Urology Consult      10/29/2019 1134 Inpatient Cardiology Consult Completed           Pertinent Test Results:    Lab Results   Component Value Date    WBC 7.37 11/02/2019    HGB 10.1 (L) 11/02/2019    HCT 30.6 (L) 11/02/2019    MCV 91.6 11/02/2019     11/02/2019      Lab Results   Component Value Date    GLUCOSE 107 (H) 11/03/2019    CALCIUM 9.6 11/03/2019     11/03/2019    K 4.8 11/03/2019    CO2 24.4 11/03/2019     11/03/2019    BUN 17 11/03/2019    CREATININE 0.99 11/03/2019    EGFRIFNONA 79 11/03/2019    BCR 17.2 11/03/2019    ANIONGAP 15.6 (H) 11/03/2019     Lab Results   Component Value Date    INR 1.26 (H) 10/30/2019    PROTIME 15.5 (H) 10/30/2019          Condition on Discharge: Stable for discharge to home with family and home health    Vital Signs  Temp:  [97.4 °F (36.3 °C)-98.3 °F (36.8 °C)] 98.3 °F (36.8 °C)  Heart Rate:  [82-97] 87  Resp:  [16-18] 18  BP: (108-143)/(64-80) 108/64      Discharge Disposition  Home-Health Care c    Discharge Medications     Discharge Medications      New Medications      Instructions Start Date   furosemide 40 MG tablet  Commonly known as:  LASIX   40 mg, Oral, Daily   Start Date:  11/4/2019     HYDROcodone-acetaminophen 5-325 MG per tablet  Commonly known as:  NORCO   2 tablets, Oral, Every 4 Hours PRN      potassium chloride 10 MEQ CR capsule  Commonly known as:  MICRO-K   10 mEq, Oral, Daily   Start Date:  11/4/2019        Continue These Medications      Instructions Start Date   aspirin 81 MG EC tablet   81 mg, Oral, Daily      atorvastatin 10 MG tablet  Commonly known as:  LIPITOR   10 mg, Oral, Nightly      CINNAMON PO   1 tablet, Oral, As Needed      Coenzyme Q10 10 MG capsule   200 mg, Oral, Daily      FLAX SEEDS PO   1,000 mg, Oral, Daily      Garlic 500 MG tablet   1 tablet, Oral, Daily      Ginger Root 500 MG capsule   550 mg, Oral, Daily      metFORMIN 1000 MG tablet  Commonly known as:  GLUCOPHAGE   1,000 mg, Oral, 2 Times Daily With Meals, IF BS IS LOW WILL ONLY TAKE 500 MG      propranolol 10 MG tablet  Commonly known as:  INDERAL   10 mg, Oral, 3 Times Daily      vitamin D3 125 MCG (5000 UT) capsule capsule   5,000 Units, Oral, Daily         Stop These Medications    chlorhexidine 0.12 % solution  Commonly known as:  PERIDEX     Chlorhexidine Gluconate 2 % pads     mupirocin 2 % ointment  Commonly known as:  BACTROBAN            Discharge Diet:   Healthy Heart Diet    Activity at Discharge:   As tolerated, no lifting > 10 pounds x 6 weeks    Follow-up Appointments  No future appointments.  Additional Instructions for the Follow-ups that You Need to Schedule     Discharge Follow-up with PCP   As directed        Currently Documented PCP:    Marjorie Danielle MD    PCP Phone Number:    [phone]     Follow Up Details:  in one month         Discharge Follow-up with Specialty: Cardiology; 2 Weeks   As directed      Specialty:  Cardiology    Follow Up:  2 Weeks    Follow Up Details:  Follow up with cardiologist Dr. Win in 2 weeks.         Discharge Follow-up with Specified Provider: Cardiac Surgery; 6 Weeks   As directed      To:  Cardiac Surgery    Follow Up:  6 Weeks    Follow Up Details:  Follow up with [facility] in 6 weeeks         Referral to Home Health   As directed      Face to Face Visit Date:  11/3/2019    Follow-up provider for Plan of Care?:  I will be treating the patient on an ongoing basis.  Please send me the Plan of Care for signature.    Follow-up provider:  REJI CHO [[id number]]    Reason/Clinical Findings:  S/P Cardiac Surgery    Describe mobility limitations that make leaving home difficult:  S/p Cardiac Surgery    Nursing/Therapeutic Services Requested:  Skilled Nursing    Skilled nursing orders:  Other (Post Cardiac Surgery Care)    Frequency:  Other (3 times weekly)         Call MD With Problems / Concerns   Nov 04, 2019      Instructions: Call for temp >101 or surgical wound drainage    Order Comments:  Instructions: Call for temp >101 or surgical wound drainage                     KAIT WARD  11/03/19  10:17 AM

## 2019-11-03 NOTE — DISCHARGE INSTR - ACTIVITY
Continue to use your incentive spirometer for an additional 2 weeks.  Continue to wear your JIMBO hose for an additional 2 weeks. You may remove them at night.  Walk 10 minutes at a time at least 3 times a day.  Do not drive for 2 weeks and no longer taking narcotics. Ride in the backseat during this period of time  Do not lift, push or pull greater than 10 pounds for 6 weeks.  You may shower, but do not submerge your incisions until your surgeon approves (i.e., no baths, pools, hot tubs, etc.).  Clean your incision daily in the shower with Dial or Ivory soap. Do not put any additional lotions, creams, or any other substance on your incision without your surgeon's approval.

## 2019-11-03 NOTE — PLAN OF CARE
Problem: Patient Care Overview  Goal: Plan of Care Review  Outcome: Ongoing (interventions implemented as appropriate)   11/03/19 0623   Coping/Psychosocial   Plan of Care Reviewed With patient;spouse   OTHER   Outcome Summary VSS. RA. SR. Pain tolerated with PO medication. Up ad mable. BM 11/2. Possible d/c today. Will continue to monitor.    Plan of Care Review   Progress improving       Problem: Fall Risk (Adult)  Goal: Absence of Fall  Outcome: Ongoing (interventions implemented as appropriate)      Problem: Cardiac Surgery (Adult)  Goal: Signs and Symptoms of Listed Potential Problems Will be Absent, Minimized or Managed (Cardiac Surgery)  Outcome: Ongoing (interventions implemented as appropriate)      Problem: Skin Injury Risk (Adult)  Goal: Skin Health and Integrity  Outcome: Ongoing (interventions implemented as appropriate)

## 2019-11-04 ENCOUNTER — TELEPHONE (OUTPATIENT)
Dept: CARDIAC SURGERY | Facility: CLINIC | Age: 54
End: 2019-11-04

## 2019-11-04 ENCOUNTER — READMISSION MANAGEMENT (OUTPATIENT)
Dept: CALL CENTER | Facility: HOSPITAL | Age: 54
End: 2019-11-04

## 2019-11-04 NOTE — OUTREACH NOTE
Prep Survey      Responses   Facility patient discharged from?  [place]   Is patient eligible?  Yes   Discharge diagnosis  Ascending and arch aortic aneurysm, Bicuspid aortopathy, Bicuspid aortic valve with severe stenosis   Does the patient have one of the following disease processes/diagnoses(primary or secondary)?  Cardiothoracic surgery   Does the patient have Home health ordered?  Yes   What is the Home health agency?   [facility]    Is there a DME ordered?  No   Medication alerts for this patient  Lasix    Prep survey completed?  Yes          Radha Ernandez RN

## 2019-11-04 NOTE — PAYOR COMM NOTE
"Davon Caraballo (54 y.o. Male)                      ATTENTION;   DC SUMMARY CASE REF # [id number] / TJ GOLDSTEIN LPN UR DEPT                                          [phone] ,  [phone]         Date of Birth Social Security Number Address Home Phone MRN    1965  [address] [phone] [id number]    Restorationist Marital Status          Yazidism        Admission Date Admission Type Admitting Provider Attending Provider Department, Room/Bed    10/29/19 Elective Felipe Shell MD  [facility] CARDIOVASC UNIT, 2227/1    Discharge Date Discharge Disposition Discharge Destination        11/3/2019 Home-Health Care Svc              Attending Provider:  (none)   Allergies:  No Known Allergies    Isolation:  None   Infection:  None   Code Status:  Prior    Ht:  177.8 cm (70\")   Wt:  90.7 kg (200 lb)    Admission Cmt:  None   Principal Problem:  Thoracic aortic aneurysm without rupture (CMS/HCC) [I71.2] More...                 Active Insurance as of 10/29/2019     Primary Coverage     Payor Plan Insurance Group Employer/Plan Group    [place] [facility] [place]      Payor Plan Address Payor Plan Phone Number Payor Plan Fax Number Effective Dates    [address] [address]   11/1/2018 - None Entered    [place] [address]       Subscriber Name Subscriber Birth Date Member ID       DAVON CARABALLO 1965 [id number]                 Emergency Contacts      (Rel.) Home Phone Work Phone Mobile Phone    PANFILO HAMILTON (Spouse) [phone] -- [phone]               Discharge Summary      Jennifer Ho APRN at 11/03/19 1017          Date of Admission: 10/29/2019  Date of Discharge:  11/3/2019    Discharge Diagnosis:   Ascending and arch aortic aneurysm, Bicuspid aortopathy, Bicuspid aortic valve with severe stenosis, Asymmetric septal hypertrophy S/p AVR/ Proximal arch repair/ Ascending aortic aneurysm repair/Septal " Myectomy    Presenting Problem/History of Present Illness  Thoracic aortic aneurysm without rupture (CMS/HCC) [I71.2]  Thoracic aortic aneurysm without rupture (CMS/HCC) [I71.2]     Hospital Course  Patient is a 54 y.o. male presented for scheduled AVR/ Proximal arch repair/ Ascending aortic aneurysm repair/Septal Myectomy with Dr. Shell on 10/29/19. He was transferred to the recovery unit in stable condition and extubated shortly after. He has recovered very well and experienced no postop complications. He passed a nocturnal oximetry prior to discharge and will require no home oxygen. He is being discharged to home with family and home health in stable condition.      Procedures Performed  Procedure(s):  10/29/19-- INTRAOPERATIVE RICHARD; STERNOTOMY THORACIC AORTIC ANEURYSM ASCENDING ARCH REPAIR WITH CIRCULATORY ARREST; AORTIC VALVE REPLACEMENT AND PRP.       Consults:   Consults     Date and Time Order Name Status Description    11/1/2019 1537 Inpatient Urology Consult      10/29/2019 1134 Inpatient Cardiology Consult Completed           Pertinent Test Results:    Lab Results   Component Value Date    WBC 7.37 11/02/2019    HGB 10.1 (L) 11/02/2019    HCT 30.6 (L) 11/02/2019    MCV 91.6 11/02/2019     11/02/2019      Lab Results   Component Value Date    GLUCOSE 107 (H) 11/03/2019    CALCIUM 9.6 11/03/2019     11/03/2019    K 4.8 11/03/2019    CO2 24.4 11/03/2019     11/03/2019    BUN 17 11/03/2019    CREATININE 0.99 11/03/2019    EGFRIFNONA 79 11/03/2019    BCR 17.2 11/03/2019    ANIONGAP 15.6 (H) 11/03/2019     Lab Results   Component Value Date    INR 1.26 (H) 10/30/2019    PROTIME 15.5 (H) 10/30/2019         Condition on Discharge: Stable for discharge to home with family and home health    Vital Signs  Temp:  [97.4 °F (36.3 °C)-98.3 °F (36.8 °C)] 98.3 °F (36.8 °C)  Heart Rate:  [82-97] 87  Resp:  [16-18] 18  BP: (108-143)/(64-80) 108/64      Discharge Disposition  Home-Health Care  [facility]    Discharge Medications     Discharge Medications      New Medications      Instructions Start Date   furosemide 40 MG tablet  Commonly known as:  LASIX   40 mg, Oral, Daily   Start Date:  11/4/2019     HYDROcodone-acetaminophen 5-325 MG per tablet  Commonly known as:  NORCO   2 tablets, Oral, Every 4 Hours PRN      potassium chloride 10 MEQ CR capsule  Commonly known as:  MICRO-K   10 mEq, Oral, Daily   Start Date:  11/4/2019        Continue These Medications      Instructions Start Date   aspirin 81 MG EC tablet   81 mg, Oral, Daily      atorvastatin 10 MG tablet  Commonly known as:  LIPITOR   10 mg, Oral, Nightly      CINNAMON PO   1 tablet, Oral, As Needed      Coenzyme Q10 10 MG capsule   200 mg, Oral, Daily      FLAX SEEDS PO   1,000 mg, Oral, Daily      Garlic 500 MG tablet   1 tablet, Oral, Daily      Ginger Root 500 MG capsule   550 mg, Oral, Daily      metFORMIN 1000 MG tablet  Commonly known as:  GLUCOPHAGE   1,000 mg, Oral, 2 Times Daily With Meals, IF BS IS LOW WILL ONLY TAKE 500 MG      propranolol 10 MG tablet  Commonly known as:  INDERAL   10 mg, Oral, 3 Times Daily      vitamin D3 125 MCG (5000 UT) capsule capsule   5,000 Units, Oral, Daily         Stop These Medications    chlorhexidine 0.12 % solution  Commonly known as:  PERIDEX     Chlorhexidine Gluconate 2 % pads     mupirocin 2 % ointment  Commonly known as:  BACTROBAN            Discharge Diet:   Healthy Heart Diet    Activity at Discharge:   As tolerated, no lifting > 10 pounds x 6 weeks    Follow-up Appointments  No future appointments.  Additional Instructions for the Follow-ups that You Need to Schedule     Discharge Follow-up with PCP   As directed       Currently Documented PCP:    Marjorie Danielle MD    PCP Phone Number:    [phone]     Follow Up Details:  in one month         Discharge Follow-up with Specialty: Cardiology; 2 Weeks   As directed      Specialty:  Cardiology    Follow Up:  2 Weeks    Follow Up Details:  Follow  up with cardiologist Dr. Win in 2 weeks.         Discharge Follow-up with Specified Provider: Cardiac Surgery; 6 Weeks   As directed      To:  Cardiac Surgery    Follow Up:  6 Weeks    Follow Up Details:  Follow up with [facility] in 6 weeeks         Referral to Home Health   As directed      Face to Face Visit Date:  11/3/2019    Follow-up provider for Plan of Care?:  I will be treating the patient on an ongoing basis.  Please send me the Plan of Care for signature.    Follow-up provider:  REJI CHO [[id number]]    Reason/Clinical Findings:  S/P Cardiac Surgery    Describe mobility limitations that make leaving home difficult:  S/p Cardiac Surgery    Nursing/Therapeutic Services Requested:  Skilled Nursing    Skilled nursing orders:  Other (Post Cardiac Surgery Care)    Frequency:  Other (3 times weekly)         Call MD With Problems / Concerns   Nov 04, 2019      Instructions: Call for temp >101 or surgical wound drainage    Order Comments:  Instructions: Call for temp >101 or surgical wound drainage                     KAIT WARD  11/03/19  10:17 AM                Electronically signed by Sebastien Felix MD at 11/03/19 1454

## 2019-11-04 NOTE — TELEPHONE ENCOUNTER
Toy a [facility] nurse called because patient is experiencing significant pain in his shoulders since surgery. I spoke with Omaira CARBALLO who ordered Flexeril 5mg to be taken every 8 hours for pain 90 tablets with no refills. I called this order in to patients pharmacy

## 2019-11-04 NOTE — PROGRESS NOTES
Case Management Discharge Note    Final Note: Pt d/c home 11/3/19 with [facility] scheduledd to follow.     Destination      No service has been selected for the patient.      Durable Medical Equipment      No service has been selected for the patient.      Dialysis/Infusion      No service has been selected for the patient.      Home Medical Care - Selection Complete      Service Provider Request Status Selected Services Address Phone Number Fax Number    [facility] Health [facility] [address] [address] [phone]      Therapy      No service has been selected for the patient.      Community Resources      No service has been selected for the patient.             Final Discharge Disposition Code: 06 - home with home health care

## 2019-11-07 ENCOUNTER — READMISSION MANAGEMENT (OUTPATIENT)
Dept: CALL CENTER | Facility: HOSPITAL | Age: 54
End: 2019-11-07

## 2019-11-07 NOTE — OUTREACH NOTE
CT Surgery Week 1 Survey      Responses   Facility patient discharged from?  [place]   Does the patient have one of the following disease processes/diagnoses(primary or secondary)?  Cardiothoracic surgery   Is there a successful TCM telephone encounter documented?  No   Week 1 attempt successful?  Yes   Call start time  1153   Rescheduled  Rescheduled-pt requested   Call end time  1153   Discharge diagnosis  Ascending and arch aortic aneurysm, Bicuspid aortopathy, Bicuspid aortic valve with severe stenosis            Yodit Larsen RN

## 2019-11-12 ENCOUNTER — READMISSION MANAGEMENT (OUTPATIENT)
Dept: CALL CENTER | Facility: HOSPITAL | Age: 54
End: 2019-11-12

## 2019-11-14 ENCOUNTER — OFFICE VISIT (OUTPATIENT)
Dept: CARDIOLOGY | Facility: CLINIC | Age: 54
End: 2019-11-14

## 2019-11-14 ENCOUNTER — TELEPHONE (OUTPATIENT)
Dept: CARDIOLOGY | Facility: CLINIC | Age: 54
End: 2019-11-14

## 2019-11-14 ENCOUNTER — CLINICAL SUPPORT (OUTPATIENT)
Dept: CARDIAC SURGERY | Facility: CLINIC | Age: 54
End: 2019-11-14

## 2019-11-14 ENCOUNTER — TELEPHONE (OUTPATIENT)
Dept: CARDIAC REHAB | Facility: HOSPITAL | Age: 54
End: 2019-11-14

## 2019-11-14 VITALS
WEIGHT: 195 LBS | HEIGHT: 70 IN | OXYGEN SATURATION: 99 % | HEART RATE: 79 BPM | SYSTOLIC BLOOD PRESSURE: 116 MMHG | BODY MASS INDEX: 27.92 KG/M2 | DIASTOLIC BLOOD PRESSURE: 70 MMHG

## 2019-11-14 DIAGNOSIS — Z95.2 S/P AVR (AORTIC VALVE REPLACEMENT): ICD-10-CM

## 2019-11-14 DIAGNOSIS — E66.9 OBESITY, UNSPECIFIED CLASSIFICATION, UNSPECIFIED OBESITY TYPE, UNSPECIFIED WHETHER SERIOUS COMORBIDITY PRESENT: ICD-10-CM

## 2019-11-14 DIAGNOSIS — E11.9 TYPE 2 DIABETES MELLITUS WITHOUT COMPLICATION, UNSPECIFIED WHETHER LONG TERM INSULIN USE (HCC): ICD-10-CM

## 2019-11-14 DIAGNOSIS — I10 HTN (HYPERTENSION), BENIGN: ICD-10-CM

## 2019-11-14 DIAGNOSIS — Z98.890 S/P ASCENDING AORTIC ANEURYSM REPAIR: Primary | ICD-10-CM

## 2019-11-14 DIAGNOSIS — G47.33 OSA (OBSTRUCTIVE SLEEP APNEA): ICD-10-CM

## 2019-11-14 DIAGNOSIS — I10 ESSENTIAL (PRIMARY) HYPERTENSION: ICD-10-CM

## 2019-11-14 DIAGNOSIS — Z48.812 ENCOUNTER FOR SURGICAL AFTERCARE FOLLOWING SURGERY ON THE CIRCULATORY SYSTEM: Primary | ICD-10-CM

## 2019-11-14 DIAGNOSIS — Z98.890 S/P VENTRICULAR SEPTAL MYECTOMY: ICD-10-CM

## 2019-11-14 DIAGNOSIS — Z86.79 S/P ASCENDING AORTIC ANEURYSM REPAIR: Primary | ICD-10-CM

## 2019-11-14 PROCEDURE — 93000 ELECTROCARDIOGRAM COMPLETE: CPT | Performed by: NURSE PRACTITIONER

## 2019-11-14 PROCEDURE — 99374 HOME HEALTH CARE SUPERVISION: CPT | Performed by: THORACIC SURGERY (CARDIOTHORACIC VASCULAR SURGERY)

## 2019-11-14 PROCEDURE — 99214 OFFICE O/P EST MOD 30 MIN: CPT | Performed by: NURSE PRACTITIONER

## 2019-11-14 NOTE — TELEPHONE ENCOUNTER
Called pt secondary to referral for cardiac rehab on work que. Explained purpose and benefits of program.  Pt said he is willing to attend if his insurance will cover it.  He says home health is finishing up this week and will not need to come back.  Pt says he is doing well. Will try to contact insurance for coverage information.

## 2019-11-14 NOTE — TELEPHONE ENCOUNTER
11/14/19   Lilia,   I have held a spot for pt on Monday 12 /9/19 @ 12:00 noon,   Will you please call pt with appt time/date.   Thanks Luis ROSE

## 2019-11-14 NOTE — PROGRESS NOTES
[facility] CARDIOLOGY  [address]  [place] [address]  Phone: [phone]      Patient Name: Brigido Caraballo  :1965  Age: 54 y.o.  Primary Cardiologist: Lucio Win MD  Encounter Provider:  KAIT Arciniega      Chief Complaint:   Chief Complaint   Patient presents with   • Follow-up     1 week Hospital         HPI  Brigido Caraballo is a 54 y.o. male who presents today for hospital follow-up/evaluation of chest pain area.     Pt has a  history significant for asending aortic aneurysm with repair on 10/29/2019, aortic valve stenosis with bicuspid aortic valve with repair on 10/29/2019, hypertension, obstructive sleep apnea, diabetes    Patient was hospitalized 10/29-11/3 where he underwent a sitting and aortic arch aneurysm repair, aortic valve replacement, septal myectomy.    Patient states that he has done well since discharge.  He reports that he does note his heartbeat more than normal, but denies tachycardia or abnormal beats.  He does note this more when he is resting.  He does note mild GONCALVES as he goes up the steps, but states that it is improving.  He also complains of some positional lightheadedness.  He also notes paresthesia to the left hand.  No loss of movement.  He denies chest pain.  Reports incision is healing well without signs of infection.  Notes chest soreness.        The following portions of the patient's history were reviewed and updated as appropriate: allergies, current medications, past family history, past medical history, past social history, past surgical history and problem list.      Review of Systems   Constitution: Negative for malaise/fatigue.   Cardiovascular: Positive for dyspnea on exertion. Negative for chest pain and leg swelling.   Respiratory: Positive for cough. Negative for shortness of breath.    Endocrine: Positive for cold intolerance, heat intolerance and polyuria.   Neurological: Positive for dizziness,  "headaches and light-headedness.   Psychiatric/Behavioral: The patient is nervous/anxious.    All other systems reviewed and are negative.      OBJECTIVE:     Vitals:    11/14/19 1101   BP: 116/70   BP Location: Right arm   Patient Position: Sitting   Pulse: 79   SpO2: 99%   Weight: 88.5 kg (195 lb)   Height: 177.8 cm (70\")     Body mass index is 27.98 kg/m².    Physical Exam   Constitutional: He is oriented to person, place, and time. Vital signs are normal. He appears well-developed and well-nourished.   Eyes: Conjunctivae are normal.   Neck: Carotid bruit is not present.   Cardiovascular: Normal rate, regular rhythm and normal heart sounds.   No murmur heard.  Pulmonary/Chest: Effort normal and breath sounds normal.   Abdominal: Normal appearance.   Musculoskeletal: Normal range of motion.   No pedal edema   Neurological: He is alert and oriented to person, place, and time. GCS eye subscore is 4. GCS verbal subscore is 5. GCS motor subscore is 6.   Skin: Skin is warm and dry.   Midsternal incision is well approximated without signs of wound dehiscence, erythema, soft tissue swelling, drainage.     Psychiatric: He has a normal mood and affect. His speech is normal and behavior is normal. Judgment and thought content normal. Cognition and memory are normal.         ECG 12 Lead  Date/Time: 11/14/2019 11:11 AM  Performed by: Bertha Benítez APRN  Authorized by: Bertha Benítez APRN   Comparison: compared with previous ECG from 10/31/2019  Rhythm: sinus rhythm  Rate: normal  BPM: 82  T inversion: I, aVL, V3, V4, V5 and V6  QRS axis: normal    Clinical impression: abnormal EKG            Cardiac Procedures:  1. Lexiscan cardiac stress test 9/25/2015.  No evidence of ischemia.  2. Echocardiogram 9/25/2015.  EF 63%.  Normal diastolic function.  Bicuspid aortic valve.  Mild to moderate aortic stenosis with peak gradient 34 mmHg and mean radiant 20 mmHg  3. Echocardiogram 8/12/2019.  EF 67%.  Grade 1 diastolic " dysfunction noted.  Bicuspid aortic valve with moderate to severe aortic valve stenosis.  Maximum pressure gradient 57 mmHg, mean pressure gradient 34 mmHg.  Calculated RVSP 29 mmHg.  A sending aorta dilated measuring 4.1 cm.  4. Myocardial perfusion stress test 8/12/2019.  No evidence of ischemia.  EF 59%.  ST segment depression 1 mm noted during stress beginning at 3 minutes of stress.  Baseline ST depression.  Patient complains of pain.  5. Cardiac catheterization 9/24/2019.  Normal coronary arteries.  Elevated LV filling pressures 27 mmHg.  Peak to peak gradient across the aortic valve is 53 mmHg.  6. A sending aortic aneurysm repair and aortic valve replacement 10/29/2019.        ASSESSMENT:      Diagnosis Plan   1. S/P ascending aortic aneurysm repair  ECG 12 Lead    Ambulatory Referral to Cardiac Rehab   2. S/P AVR (aortic valve replacement)  ECG 12 Lead    Ambulatory Referral to Cardiac Rehab   3. S/P ventricular septal myectomy  Ambulatory Referral to Cardiac Rehab   4. HTN (hypertension), benign  ECG 12 Lead   5. ZENON (obstructive sleep apnea)           PLAN OF CARE:     1. Status post ascending aortic aneurysm repair and aortic valve replacement with ventricular septal myectomy.  Patient is doing well since postop.  He does complain of some generalized chest soreness but otherwise denies any shortness of breath or chest pain.  He is ambulating at home without difficulty.  Incision is healing well without signs of infection.  Patient remains in normal sinus rhythm on ECG today.  Will refer patient to cardiac rehab.  Continue current medication regimen.  2. Hypertension.  Blood pressure well controlled in office today at 116/70.  Continue current regimen.  3. Obstructive sleep apnea.  4. Follow-up with Dr. Win in 6 weeks.  Sooner for problems or complications.             Discharge Medications           Accurate as of 11/14/19 11:27 AM. If you have any questions, ask your nurse or doctor.                Continue These Medications      Instructions Start Date   aspirin 81 MG EC tablet   81 mg, Oral, Daily      atorvastatin 10 MG tablet  Commonly known as:  LIPITOR   10 mg, Oral, Nightly      CINNAMON PO   1 tablet, Oral, As Needed      Coenzyme Q10 10 MG capsule   200 mg, Oral, Daily      FLAX SEEDS PO   1,000 mg, Oral, Daily      Garlic 500 MG tablet   1 tablet, Oral, Daily      Ginger Root 500 MG capsule   550 mg, Oral, Daily      metFORMIN 1000 MG tablet  Commonly known as:  GLUCOPHAGE   1,000 mg, Oral, 2 Times Daily With Meals, IF BS IS LOW WILL ONLY TAKE 500 MG      propranolol 10 MG tablet  Commonly known as:  INDERAL   10 mg, Oral, 3 Times Daily      vitamin D3 125 MCG (5000 UT) capsule capsule   5,000 Units, Oral, Daily         Stop These Medications    furosemide 40 MG tablet  Commonly known as:  LASIX  Stopped by:  KAIT Arciniega     potassium chloride 10 MEQ CR capsule  Commonly known as:  MICRO-K  Stopped by:  KAIT Arciniega            Thank you for allowing me to participate in the care of your patient,         KAIT Arciniega  [facility]  11/14/19  11:27 AM    **Dragon Disclaimer:**  Much of this encounter note is an electronic transcription/translation of spoken language to printed text. The electronic translation of spoken language may permit erroneous, or at times, nonsensical words or phrases to be inadvertently transcribed. Although I have reviewed the note for such errors, some may still exist.

## 2019-11-14 NOTE — TELEPHONE ENCOUNTER
----- Message from KAIT Arciniega sent at 11/14/2019 11:37 AM EST -----  Kaleigh,     I know Lucio's schedule is trying, he needs a post op follow up appointment for AVR and aneurysm repair from 10/29.

## 2019-11-21 ENCOUNTER — TELEPHONE (OUTPATIENT)
Dept: CARDIAC REHAB | Facility: HOSPITAL | Age: 54
End: 2019-11-21

## 2019-11-21 ENCOUNTER — READMISSION MANAGEMENT (OUTPATIENT)
Dept: CALL CENTER | Facility: HOSPITAL | Age: 54
End: 2019-11-21

## 2019-11-21 NOTE — TELEPHONE ENCOUNTER
Follow up phone call to patient to make appointment to attend cardiac rehab program.  Finally received authorization for CR from insurance.  Explained insurance coverage to patient and time commitment to program.  Pt wants to attend program, but doesn't feel quite ready to start yet.  Prefers appointment first week in December.  Provided 12/3 appt for 1:30 pm.

## 2019-11-21 NOTE — OUTREACH NOTE
"CT Surgery Week 2 Survey      Responses   Facility patient discharged from?  [place]   Does the patient have one of the following disease processes/diagnoses(primary or secondary)?  Cardiothoracic surgery   Week 2 attempt successful?  Yes   Call start time  1440   Call end time  1449   Discharge diagnosis  Ascending and arch aortic aneurysm, Bicuspid aortopathy, Bicuspid aortic valve with severe stenosis   Is patient permission given to speak with other caregiver?  Yes   List who call center can speak with  Wife   Person spoke with today (if not patient) and relationship  Wife   Meds reviewed with patient/caregiver?  Yes   Is the patient taking all medications as directed (includes completed medication regime)?  Yes   Has the patient kept scheduled appointments due by today?  Yes   Is the patient/caregiver able to teach back normal signs of recovery?  Pain or discomfort at incisional site   Nursing interventions  Reassured on normal signs of recovery   Is the patient /caregiver able to teach back basic post-op care?  Keep incision areas clean, dry and protected, Lifting as instructed by MD in discharge instructions, Take showers only when approved by MD-sponge bathe until then, Drive as instructed by MD in discharge instructions   Is the patient/caregiver able to teach back signs and symptoms of incisional infection?  Pus or odor from incision, Fever, Increased drainage or bleeding, Increased redness, swelling or pain at the incisonal site, Incisional warmth   Is the patient/caregiver able to teach back the hierarchy of who to call/visit for symptoms/problems? PCP, Specialist, Home health nurse, Urgent Care, ED, 911  Yes   Additional teach back comments  Pt has \"itching\" on back and lower abdomen. Not rash or dry skin. Wife will keep an eye on areas and pt may be having rxn to dial soap. Inciion has small darkened area on lower end of incision and wife is keeping an eye on this.    Week 2 call completed?  Yes    "       Anna Polo RN

## 2019-11-25 ENCOUNTER — TELEPHONE (OUTPATIENT)
Dept: CARDIOLOGY | Facility: CLINIC | Age: 54
End: 2019-11-25

## 2019-11-25 NOTE — TELEPHONE ENCOUNTER
Called patient back. He said he called Dr. Shell in the mean time. Dr. Shell's recommendations were that this is normal nerve pain that happens for younger open heart patients. Spoke with Dr. Win. He felt comfortable with Dr. Shell's recommendations. Called patient back and he feels comfortable with the explanations he's received. He will not be coming to [facility].    Vikki Lou RN  [place] [facility]

## 2019-11-25 NOTE — TELEPHONE ENCOUNTER
This sounds like reproducible pain from the sternotomy incision site.  However, since these are new symptoms, he probably should go to the [facility] to be evaluated.  My schedule is very tight today, so the [facility] doc will probably need to see him.  Can you please let him know?  Thanks     MARQUES

## 2019-11-25 NOTE — TELEPHONE ENCOUNTER
"11/25/19  8:23 AM  Brigido Caraballo  1965  Home Phone [phone]   Mobile [phone]     Brigido Bennett called this morning. He had open heart surgery on 10-29-19. He said since this last Saturday he has been having \"pains in his chest\". The pain goes from his incision and radiates out three inches to each side, but is worse on the left than on the rights. He said the pain radiates to his left arm pit. He said he didn't do anything to exacerbate the pain. He also said it helps to put pressure on the area is his having pain, but it comes back when the pressure is released.    He wanted to know if he could be seen today. I explained that probably would not be the case since we don't have any openings this week. Do you have any advice for him? Does he need to go to [facility] or ER?    Thank you!    Vikki Lou RN  Triage [facility]        "

## 2019-11-29 ENCOUNTER — READMISSION MANAGEMENT (OUTPATIENT)
Dept: CALL CENTER | Facility: HOSPITAL | Age: 54
End: 2019-11-29

## 2019-11-29 NOTE — OUTREACH NOTE
CT Surgery Week 3 Survey      Responses   Facility patient discharged from?  [facility]   Does the patient have one of the following disease processes/diagnoses(primary or secondary)?  Cardiothoracic surgery   Week 3 attempt successful?  Yes   Call start time  1431   Call end time  1440   Discharge diagnosis  Ascending and arch aortic aneurysm, Bicuspid aortopathy, Bicuspid aortic valve with severe stenosis   Meds reviewed with patient/caregiver?  Yes   Is the patient having any side effects they believe may be caused by any medication additions or changes?  No   Does the patient have all medications related to this admission filled (includes all antibiotics, pain medications, cardiac medications, etc.)  Yes   Is the patient taking all medications as directed (includes completed medication regime)?  Yes   Does the patient have a primary care provider?   Yes   Does the patient have an appointment scheduled with their C/T surgeon?  Yes   Has the patient kept scheduled appointments due by today?  Yes   Has home health visited the patient within 72 hours of discharge?  Yes   Psychosocial issues?  No   Did the patient receive a copy of their discharge instructions?  Yes   Nursing interventions  Reviewed instructions with patient   What is the patient's perception of their health status since discharge?  Improving   Nursing interventions  Nurse provided patient education   Is the patient/caregiver able to teach back normal signs of recovery?  Pain or discomfort at incisional site   Nursing interventions  Reassured on normal signs of recovery   Is the patient /caregiver able to teach back basic post-op care?  Drive as instructed by MD in discharge instructions, Take showers only when approved by MD-sponge bathe until then, No tub bath, swimming, or hot tub until instructed by MD, Keep incision areas clean, dry and protected, Do not remove steri-strips, Lifting as instructed by MD in discharge instructions   Is the  patient/caregiver able to teach back signs and symptoms of incisional infection?  Increased redness, swelling or pain at the incisonal site, Increased drainage or bleeding, Incisional warmth, Pus or odor from incision, Fever   Is the patient/caregiver able to teach back steps to recovery at home?  Set small, achievable goals for return to baseline health, Rest and rebuild strength, gradually increase activity, Eat a well-balance diet   Is the patient /caregiver able to teach back the importance of cardiac rehab?  Yes   Is the patient/caregiver able to teach back the hierarchy of who to call/visit for symptoms/problems? PCP, Specialist, Home health nurse, Urgent Care, ED, 911  Yes   Week 3 call completed?  Yes          Jessy Roy RN

## 2019-12-03 ENCOUNTER — OFFICE VISIT (OUTPATIENT)
Dept: CARDIAC REHAB | Facility: HOSPITAL | Age: 54
End: 2019-12-03

## 2019-12-03 VITALS — BODY MASS INDEX: 27.61 KG/M2 | HEIGHT: 71 IN | WEIGHT: 197.2 LBS

## 2019-12-03 DIAGNOSIS — Z98.890 S/P ASCENDING AORTIC ANEURYSM REPAIR: ICD-10-CM

## 2019-12-03 DIAGNOSIS — Z95.2 S/P AVR (AORTIC VALVE REPLACEMENT): Primary | ICD-10-CM

## 2019-12-03 DIAGNOSIS — Z86.79 S/P ASCENDING AORTIC ANEURYSM REPAIR: ICD-10-CM

## 2019-12-03 DIAGNOSIS — Z98.890 S/P VENTRICULAR SEPTAL MYECTOMY: ICD-10-CM

## 2019-12-03 PROCEDURE — 93797 PHYS/QHP OP CAR RHAB WO ECG: CPT

## 2019-12-03 NOTE — PROGRESS NOTES
Cardiac Rehab Initial Assessment      Name: Brigido Caraballo  :1965 Allergies:Patient has no known allergies.   MRN: [id number] 54 y.o. Physician: Marjorie Danielle MD   Primary Diagnosis:    Diagnosis Plan   1. S/P AVR (aortic valve replacement)     2. S/P ascending aortic aneurysm repair     3. S/P ventricular septal myectomy      Event Date: 10/29/19   Specialist: Dr. Evans   Secondary Diagnosis: Risk Stratification:Moderate Risk Note Author: Vannesa Rios RN     Cardiovascular History: Comments first heart event     EXERCISE AT HOME  no  Currently walking and doing the exercises that were given to him by home health      EF: 67%      Source: Echo 19          Ambulatory Status:Independent  Ambulatory Fall Risk Assessed on Initial Visit: yes 6 Minute Walk Pre- Cardiac Rehab:  Distance:1018ft      RPE:3  Max. HR: 86       SPO2:97    MET: 2.5  MPH: 1.9             RPD: 3  Resting BP: 122/80 LA, 126/84 RA    Peak BP: 144/80  Recovery BP: 116/78  Comments: Experienced some mild mid right upper chest and left lower chest pain while walking about 2-3 level.  States these are the areas that are tender when he gets up out of bed every morning.       NUTRITION  Lipids:yes If yes, labs as follows;  Total: No components found for: CHOLESTEROL  HDL:   HDL Cholesterol   Date Value Ref Range Status   10/25/2019 38 (L) 40 - 60 mg/dL Final    Lipids continued:  LDL:  LDL Cholesterol    Date Value Ref Range Status   10/25/2019 71 0 - 100 mg/dL Final     Triglyceride: No components found for: TRIGLYCERIDE   Weight Management:                 Weight: 197.2  Height: 71                            BMI: Body mass index is 27.5 kg/m².  Waist Circumference: 42 inches   Alcohol Use: defer Diabetes:Yes,  Monitors BS at home- yes, Frequency: 1 time daily, Random BS: 120 fasting this am    Last HGBA1C with date if applicable:No components found for: A1C         SOCIAL HISTORY  Social History     Socioeconomic History   •  Marital status:      Spouse name: Not on file   • Number of children: Not on file   • Years of education: Not on file   • Highest education level: Not on file   Tobacco Use   • Smoking status: Former Smoker     Packs/day: 1.00     Years: 20.00     Pack years: 20.00     Types: Cigarettes     Last attempt to quit: 2011     Years since quittin.9   • Smokeless tobacco: Never Used   • Tobacco comment: Smoked 1 ppd for 25 years - quit 2011   Substance and Sexual Activity   • Alcohol use: Yes     Frequency: Never     Comment: SOCIALLY   • Drug use: No   • Sexual activity: Defer       Educational Level (choose one that applies) some college Learning Barriers:Ready to Learn    Family Support:yes    Living Arrangement: lives with their spouse        Risk Factors: Stress  Yes   Clinical depression no  Family history yes Mother 2 open heart surgeries.    Hyperlipidemia had been on Lipitor  Was dancing 1-2 times per week. Social dancing for 1-2 hours each session.    Obesity   Tobacco Adjunct: No  Smoked 1ppd for about 20 years quit           Comorbidities: Pulmonary disease, Diabetes Mellitus and 0     PSYCHOSOCIAL  Clinical Depression: no    Stress: yes     Assess presence or absence of depression using a valid screening tool: yes      PHYSICAL ASSESSMENT  Influenza vaccine: no  Pneumococcal vaccine: no          Angina: no    Describe angina scale of 0 - 4: 0 = none    Today are you having incisional pain? Yes. If, Yes, Scale: 2-3    Today are you having any other pain? No. If, Yes, Scale: 0   Diagnosed with Hypertension:yes    Heart Sounds: regular  Rhythm  Valve sounds heard    Lung Sounds: normal air entry, lungs clear to auscultation         Assessment: Midline incision healing well.  Having some tenderness at the upper outer sides of the incision.   Orthopedic Problems: None.  Sees a podiatrist.     Are you being hurt, hit, or frightened by anyone at home or in your life? no    Are you being neglected by  a caregiver? N/A Shoulder flexibility/Range of motion: Average   Some tenderness again in chest with movement of arms to back of neck.   Recommended arm activity: Any    Chair sit and reach within: 12 inches   Leg flexibility: Below average    Leg Strength/Balance/Five times sit to stand: 10 seconds.     Chose one: Average    Recommended stretching: Standing    Assessment: Good balance and arm flexibility  Below average leg flexibility  Family attends [place]: yes Time of arrival: 1330  Time of departure: 1510     Patient Goals: To return to doing social dancing.  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          12/3/2019  3:10 PM  Vannesa Rios RN

## 2019-12-06 ENCOUNTER — READMISSION MANAGEMENT (OUTPATIENT)
Dept: CALL CENTER | Facility: HOSPITAL | Age: 54
End: 2019-12-06

## 2019-12-06 NOTE — OUTREACH NOTE
CT Surgery Week 4 Survey      Responses   Facility patient discharged from?  [facility]   Does the patient have one of the following disease processes/diagnoses(primary or secondary)?  Cardiothoracic surgery   Week 4 attempt successful?  No          Shaina Rivera RN

## 2019-12-09 ENCOUNTER — OFFICE VISIT (OUTPATIENT)
Dept: CARDIOLOGY | Facility: CLINIC | Age: 54
End: 2019-12-09

## 2019-12-09 ENCOUNTER — TREATMENT (OUTPATIENT)
Dept: CARDIAC REHAB | Facility: HOSPITAL | Age: 54
End: 2019-12-09

## 2019-12-09 VITALS
OXYGEN SATURATION: 99 % | BODY MASS INDEX: 27.92 KG/M2 | HEART RATE: 70 BPM | WEIGHT: 199.4 LBS | HEIGHT: 71 IN | DIASTOLIC BLOOD PRESSURE: 74 MMHG | SYSTOLIC BLOOD PRESSURE: 130 MMHG

## 2019-12-09 DIAGNOSIS — Z98.890 STATUS POST VENTRICULAR SEPTAL MYECTOMY: ICD-10-CM

## 2019-12-09 DIAGNOSIS — Z95.2 S/P AVR (AORTIC VALVE REPLACEMENT): Primary | ICD-10-CM

## 2019-12-09 DIAGNOSIS — Q23.1 CONGENITAL BICUSPID AORTIC VALVE: ICD-10-CM

## 2019-12-09 DIAGNOSIS — Z98.890 STATUS POST ASCENDING AORTIC ANEURYSM REPAIR: ICD-10-CM

## 2019-12-09 DIAGNOSIS — Z95.3 STATUS POST AORTIC VALVE REPLACEMENT WITH TISSUE: Primary | ICD-10-CM

## 2019-12-09 DIAGNOSIS — Z86.79 STATUS POST ASCENDING AORTIC ANEURYSM REPAIR: ICD-10-CM

## 2019-12-09 PROCEDURE — 99214 OFFICE O/P EST MOD 30 MIN: CPT | Performed by: INTERNAL MEDICINE

## 2019-12-09 PROCEDURE — 93798 PHYS/QHP OP CAR RHAB W/ECG: CPT

## 2019-12-11 ENCOUNTER — TREATMENT (OUTPATIENT)
Dept: CARDIAC REHAB | Facility: HOSPITAL | Age: 54
End: 2019-12-11

## 2019-12-11 DIAGNOSIS — Z86.79 S/P ASCENDING AORTIC ANEURYSM REPAIR: ICD-10-CM

## 2019-12-11 DIAGNOSIS — Z98.890 S/P ASCENDING AORTIC ANEURYSM REPAIR: ICD-10-CM

## 2019-12-11 DIAGNOSIS — Z95.2 S/P AVR (AORTIC VALVE REPLACEMENT): Primary | ICD-10-CM

## 2019-12-11 PROCEDURE — 93798 PHYS/QHP OP CAR RHAB W/ECG: CPT

## 2019-12-12 ENCOUNTER — OFFICE VISIT (OUTPATIENT)
Dept: CARDIAC SURGERY | Facility: CLINIC | Age: 54
End: 2019-12-12

## 2019-12-12 VITALS
TEMPERATURE: 97.8 F | BODY MASS INDEX: 27.27 KG/M2 | DIASTOLIC BLOOD PRESSURE: 87 MMHG | HEART RATE: 81 BPM | HEIGHT: 71 IN | OXYGEN SATURATION: 97 % | SYSTOLIC BLOOD PRESSURE: 136 MMHG | WEIGHT: 194.8 LBS | RESPIRATION RATE: 20 BRPM

## 2019-12-12 DIAGNOSIS — Z95.2 S/P AVR (AORTIC VALVE REPLACEMENT): ICD-10-CM

## 2019-12-12 PROCEDURE — 99024 POSTOP FOLLOW-UP VISIT: CPT | Performed by: NURSE PRACTITIONER

## 2019-12-13 ENCOUNTER — TREATMENT (OUTPATIENT)
Dept: CARDIAC REHAB | Facility: HOSPITAL | Age: 54
End: 2019-12-13

## 2019-12-13 DIAGNOSIS — Z95.2 S/P AVR (AORTIC VALVE REPLACEMENT): Primary | ICD-10-CM

## 2019-12-13 PROCEDURE — 93798 PHYS/QHP OP CAR RHAB W/ECG: CPT

## 2019-12-13 RX ORDER — CYCLOBENZAPRINE HCL 5 MG
5 TABLET ORAL 3 TIMES DAILY PRN
Qty: 90 TABLET | Refills: 0 | Status: SHIPPED | OUTPATIENT
Start: 2019-12-13

## 2019-12-13 NOTE — PROGRESS NOTES
"CARDIOVASCULAR SURGERY FOLLOW-UP PROGRESS NOTE  Chief Complaint: post op        HPI:   Dear Marjorie Feng MD and colleagues:    It was nice to see Brigido Caraballo in follow up 6 weeks after surgery.  As you know, he is a 54 y.o. male with ascending aortic aneurysm  who underwent ascending aortic aneurysm repair, AVR by Dr. Shell. He did well postoperatively and continues to do well. He comes in today complaining of nothing.  His activity level has been good.       Physical Exam:         /87 (BP Location: Left arm, Patient Position: Sitting, Cuff Size: Adult)   Pulse 81   Temp 97.8 °F (36.6 °C) (Oral)   Resp 20   Ht 180.3 cm (71\")   Wt 88.4 kg (194 lb 12.8 oz)   SpO2 97%   BMI 27.17 kg/m²   Heart:  regular rate and rhythm  Lungs:  clear to auscultation bilaterally  Extremities:  no edema  Incision(s):  sternum stable, healed incisions    Assessment/Plan:     S/P AVR and ascending aortic aneurysm repair. Overall, he is doing well.    No significant post-op complications    OK to drive if not taking narcotic pain medicine  OK to begin cardiac rehab  Follow-up as scheduled with cardiology  Follow-up as scheduled with PCP  Return to clinic in 1 year(s) with CT      Thank you for allowing me to participate in the care of your   patient.  Regards,  KAIT Ferguson      "

## 2019-12-16 ENCOUNTER — TELEPHONE (OUTPATIENT)
Dept: CARDIOLOGY | Facility: CLINIC | Age: 54
End: 2019-12-16

## 2019-12-16 ENCOUNTER — TREATMENT (OUTPATIENT)
Dept: CARDIAC REHAB | Facility: HOSPITAL | Age: 54
End: 2019-12-16

## 2019-12-16 DIAGNOSIS — Z95.2 S/P AVR (AORTIC VALVE REPLACEMENT): Primary | ICD-10-CM

## 2019-12-16 PROCEDURE — 93798 PHYS/QHP OP CAR RHAB W/ECG: CPT

## 2019-12-17 NOTE — TELEPHONE ENCOUNTER
Patient paged the answering service.  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

## 2019-12-18 ENCOUNTER — TREATMENT (OUTPATIENT)
Dept: CARDIAC REHAB | Facility: HOSPITAL | Age: 54
End: 2019-12-18

## 2019-12-18 DIAGNOSIS — Z86.79 S/P ASCENDING AORTIC ANEURYSM REPAIR: ICD-10-CM

## 2019-12-18 DIAGNOSIS — Z98.890 S/P VENTRICULAR SEPTAL MYECTOMY: ICD-10-CM

## 2019-12-18 DIAGNOSIS — Z98.890 S/P ASCENDING AORTIC ANEURYSM REPAIR: ICD-10-CM

## 2019-12-18 DIAGNOSIS — Z95.2 S/P AVR (AORTIC VALVE REPLACEMENT): Primary | ICD-10-CM

## 2019-12-18 PROCEDURE — 93798 PHYS/QHP OP CAR RHAB W/ECG: CPT

## 2019-12-20 ENCOUNTER — TELEPHONE (OUTPATIENT)
Dept: CARDIOLOGY | Facility: CLINIC | Age: 54
End: 2019-12-20

## 2019-12-20 NOTE — TELEPHONE ENCOUNTER
Patient called the other day about having a headache for a split second during sexual intercourse.  I discussed with Dr. Win and we suspect that potentially his blood pressure may be rising.  I just spoke with the patient he said he has had intercourse another time and he did not have any symptoms.  He will check his blood pressure the next time it happens and call with any concerns.  I told him if it continues to happen he needs to see his PCP.

## 2019-12-26 NOTE — PROGRESS NOTES
Date of Office Visit: 2019  Encounter Provider: Brayden Win MD  Place of Service: [facility] CARDIOLOGY  Patient Name: Brigido Caraballo  :1965    Chief complaint: Follow-up for congenital bicuspid aortic valve, ascending aortic   aneurysm repair, tissue aortic valve replacement, and septal myectomy.    History of Present Illness:    I again had the pleasure of seeing the patient in cardiology office on 2019.  He is a very pleasant 54 year-old white male with a past medical history significant for a congenital bicuspid aortic valve, tissue aortic valve replacement, septal myectomy, and ascending aortic aneurysm repair who presents for follow-up.  The patient had formerly followed with Dr. Jorge Alberto Forde at [facility], but switched care to me on 2018.    The patient had an echocardiogram performed on 2019 which showed progression in his aortic stenosis to moderate to severe.  He also had a noncontrast CT of his chest on 2019 which showed a 5 cm ascending aortic aneurysm.  Given this, he was referred for surgical evaluation.  He underwent a left heart catheterization on 2019 which showed angiographically normal coronary arteries.  He ultimately underwent an ascending aortic aneurysm repair with a 28 mm Dacron interposition graft, proximal arch repair with a 28 mm graft with anastomosis in a leonora-arch configuration, tissue aortic valve replacement with a 25 mm Magna pericardial valve, and septal myectomy on 10/29/2019 by Dr. Shell.  He did well postoperatively, and did not have any significant complications.    The patient presents today for follow-up.  He does have some numbness and some mild discomfort at his incision site, although he has not had other significant symptoms.  He is slowly regaining his strength back.  He is ambulating without difficulty.  He has not had any shortness of breath.  He has not felt any palpitations or rapid  heart rates.    Past Medical History:   Diagnosis Date   • Abnormal EKG     Baseline anterolateral T wave abnormalities    • Aortic aneurysm (CMS/HCC)    • Aortic stenosis    • Arthritis     THUMBS   • Bicuspid aortic valve    • Diabetes (CMS/HCC) 2011   • Heart murmur    • Hyperlipidemia    • Hypertension    • ZENON (obstructive sleep apnea)     Intolerant to CPAP  TESTED 20 YRS AGO  NO PROBLEMS NOW USES  NOSE STRIP AT NIGHT       Past Surgical History:   Procedure Laterality Date   • ASCENDING ARCH/HEMIARCH REPLACEMENT N/A 10/29/2019    Procedure: INTRAOPERATIVE RICHARD; STERNOTOMY THORACIC AORTIC ANEURYSM ASCENDING ARCH REPAIR WITH CIRCULATORY ARREST; AORTIC VALVE REPLACEMENT AND PRP.;  Surgeon: Felipe Shell MD;  Location: [facility] MAIN OR;  Service: Cardiothoracic   • CARDIAC CATHETERIZATION N/A 9/24/2019    Procedure: Left Heart Cath (pre-op aortic valve replacement and aortic aneurysm repair);  Surgeon: Crow Ely MD;  Location: [facility] CATH INVASIVE LOCATION;  Service: Cardiovascular       Current Outpatient Medications on File Prior to Visit   Medication Sig Dispense Refill   • aspirin 81 MG EC tablet Take 81 mg by mouth Daily.     • atorvastatin (LIPITOR) 10 MG tablet Take 10 mg by mouth Every Night.     • Cholecalciferol (VITAMIN D3) 5000 units capsule capsule Take 5,000 Units by mouth Daily.     • CINNAMON PO Take 1 tablet by mouth As Needed.     • Coenzyme Q10 10 MG capsule Take 200 mg by mouth Daily.     • Flaxseed, Linseed, (FLAX SEEDS PO) Take 1,000 mg by mouth Daily.     • Garlic 500 MG tablet Take 1 tablet by mouth Daily.     • Ginger, Zingiber officinalis, (GINGER ROOT) 500 MG capsule Take 550 mg by mouth Daily.     • metFORMIN (GLUCOPHAGE) 1000 MG tablet Take 1,000 mg by mouth 2 (Two) Times a Day With Meals. IF BS IS LOW WILL ONLY TAKE 500 MG     • propranolol (INDERAL) 10 MG tablet Take 10 mg by mouth 3 (Three) Times a Day.       No current facility-administered medications on file prior to  "visit.      Allergies as of 2019   • (No Known Allergies)     Social History     Socioeconomic History   • Marital status:      Spouse name: Not on file   • Number of children: Not on file   • Years of education: Not on file   • Highest education level: Not on file   Tobacco Use   • Smoking status: Former Smoker     Packs/day: 1.00     Years: 20.00     Pack years: 20.00     Types: Cigarettes     Last attempt to quit:      Years since quittin.9   • Smokeless tobacco: Never Used   • Tobacco comment: Smoked 1 ppd for 25 years - quit 2011   Substance and Sexual Activity   • Alcohol use: Yes     Frequency: Never     Comment: SOCIALLY   • Drug use: No   • Sexual activity: Defer     Family History   Problem Relation Age of Onset   • Coronary artery disease Mother    • Aortic stenosis Mother    • Stroke Mother    • Diabetes Father    • Stroke Father    • Malig Hyperthermia Neg Hx        Review of Systems   Constitution: Positive for malaise/fatigue.   All other systems reviewed and are negative.     Objective:     Vitals:    19 1233   BP: 130/74   Pulse: 70   SpO2: 99%   Weight: 90.4 kg (199 lb 6.4 oz)   Height: 180.3 cm (70.98\")     Body mass index is 27.82 kg/m².    Physical Exam   Constitutional: He is oriented to person, place, and time. He appears well-developed and well-nourished.   HENT:   Head: Normocephalic and atraumatic.   Eyes: Conjunctivae are normal.   Neck: Neck supple.   Cardiovascular: Normal rate and regular rhythm. Exam reveals no gallop and no friction rub.   Murmur heard.   Systolic murmur is present with a grade of 2/6 at the upper right sternal border and upper left sternal border.  Pulmonary/Chest: Effort normal and breath sounds normal.   Abdominal: Soft. There is no tenderness.   Musculoskeletal: He exhibits no edema.   Neurological: He is alert and oriented to person, place, and time.   Skin: Skin is warm.   Psychiatric: He has a normal mood and affect. His behavior is " normal.     Lab Review:   Procedures    Cardiac Procedures:  1.  Echocardiogram on 8/12/2019: Ejection fraction was 67%.  There was mild LVH.  There was grade 1 diastolic dysfunction.  The right ventricle was normal.  The left atrium was mildly enlarged.  The bicuspid aortic valve was heavily calcified with moderate to severe aortic stenosis.  There was mild tricuspid regurgitation.  2.  CT scan of the chest without contrast on 9/9/2019: There was a 5 cm ascending aortic aneurysm.  3.  Left heart catheterization on 9/24/2019: Angiographically normal coronary arteries.  4.  Status post ascending aortic aneurysm repair with a 28 mm Dacron interposition graft, proximal arch repair with a 28 mm graft with anastomosis in a leonora-arch configuration, tissue aortic valve replacement with a 25 mm Magna pericardial valve, and septal myectomy on 10/29/2019 by Dr. Shell.     Assessment:       Diagnosis Plan   1. Status post aortic valve replacement with tissue  Adult Transthoracic Echo Complete W/ Cont if Necessary Per Protocol   2. Status post ventricular septal myectomy  Adult Transthoracic Echo Complete W/ Cont if Necessary Per Protocol   3. Congenital bicuspid aortic valve     4. Status post ascending aortic aneurysm repair       Plan:       The patient has really done fairly well after his surgery.  He did not have any noted atrial fibrillation, and he has had no palpitations.  I reassured him that the numbness and discomfort at the incision site is normal, and will likely resolve.  He will continue on the aspirin for his history of tissue aortic valve replacement.  I did advise him that he will need to take prophylactic antibiotics prior to any dental procedures and dental cleanings.  I emphasized that this was very important.  He will follow-up with Dr. Shell for the aneurysm repair.  I am going to check an initial postoperative echocardiogram to assess his tissue aortic valve replacement and ejection fraction.  He also  had a septal myectomy, and this will also need to be reassessed.  I will refer him to cardiac rehab.  I will see him back in the office in 3 to 4 months.

## 2019-12-27 ENCOUNTER — HOSPITAL ENCOUNTER (OUTPATIENT)
Dept: CARDIOLOGY | Facility: HOSPITAL | Age: 54
Discharge: HOME | End: 2019-12-27
Admitting: INTERNAL MEDICINE

## 2019-12-27 VITALS
WEIGHT: 194 LBS | HEIGHT: 71 IN | HEART RATE: 72 BPM | BODY MASS INDEX: 27.16 KG/M2 | DIASTOLIC BLOOD PRESSURE: 74 MMHG | SYSTOLIC BLOOD PRESSURE: 130 MMHG

## 2019-12-27 DIAGNOSIS — Z95.3 STATUS POST AORTIC VALVE REPLACEMENT WITH TISSUE: ICD-10-CM

## 2019-12-27 DIAGNOSIS — Z98.890 STATUS POST VENTRICULAR SEPTAL MYECTOMY: ICD-10-CM

## 2019-12-27 LAB
AORTIC ARCH: 2.4 CM
AORTIC DIMENSIONLESS INDEX: 1.4 (DI)
AORTIC ROOT ANNULUS: 2.2 CM
BH CV ECHO MEAS - ACS: 2 CM
BH CV ECHO MEAS - AO MAX PG (FULL): 26.2 MMHG
BH CV ECHO MEAS - AO MAX PG: 30.5 MMHG
BH CV ECHO MEAS - AO MEAN PG (FULL): 10.3 MMHG
BH CV ECHO MEAS - AO MEAN PG: 12.1 MMHG
BH CV ECHO MEAS - AO ROOT AREA (BSA CORRECTED): 1.8
BH CV ECHO MEAS - AO ROOT AREA: 11.4 CM^2
BH CV ECHO MEAS - AO ROOT DIAM: 3.8 CM
BH CV ECHO MEAS - AO V2 MAX: 276.2 CM/SEC
BH CV ECHO MEAS - AO V2 MEAN: 156.4 CM/SEC
BH CV ECHO MEAS - AO V2 VTI: 54 CM
BH CV ECHO MEAS - AVA(I,A): 1.6 CM^2
BH CV ECHO MEAS - AVA(I,D): 1.6 CM^2
BH CV ECHO MEAS - AVA(V,A): 1.4 CM^2
BH CV ECHO MEAS - AVA(V,D): 1.4 CM^2
BH CV ECHO MEAS - BSA(HAYCOCK): 2.1 M^2
BH CV ECHO MEAS - BSA: 2.1 M^2
BH CV ECHO MEAS - BZI_BMI: 27.1 KILOGRAMS/M^2
BH CV ECHO MEAS - BZI_METRIC_HEIGHT: 180.3 CM
BH CV ECHO MEAS - BZI_METRIC_WEIGHT: 88 KG
BH CV ECHO MEAS - EDV(MOD-SP2): 70 ML
BH CV ECHO MEAS - EDV(MOD-SP4): 65 ML
BH CV ECHO MEAS - EDV(TEICH): 87.7 ML
BH CV ECHO MEAS - EF(CUBED): 57.5 %
BH CV ECHO MEAS - EF(MOD-BP): 60 %
BH CV ECHO MEAS - EF(MOD-SP2): 58.6 %
BH CV ECHO MEAS - EF(MOD-SP4): 61.5 %
BH CV ECHO MEAS - EF(TEICH): 49.4 %
BH CV ECHO MEAS - ESV(MOD-SP2): 29 ML
BH CV ECHO MEAS - ESV(MOD-SP4): 25 ML
BH CV ECHO MEAS - ESV(TEICH): 44.4 ML
BH CV ECHO MEAS - FS: 24.8 %
BH CV ECHO MEAS - IVS/LVPW: 1.1
BH CV ECHO MEAS - IVSD: 1.3 CM
BH CV ECHO MEAS - LAT PEAK E' VEL: 8 CM/SEC
BH CV ECHO MEAS - LV DIASTOLIC VOL/BSA (35-75): 31.2 ML/M^2
BH CV ECHO MEAS - LV MASS(C)D: 211.2 GRAMS
BH CV ECHO MEAS - LV MASS(C)DI: 101.5 GRAMS/M^2
BH CV ECHO MEAS - LV MAX PG: 4.3 MMHG
BH CV ECHO MEAS - LV MEAN PG: 1.8 MMHG
BH CV ECHO MEAS - LV SYSTOLIC VOL/BSA (12-30): 12 ML/M^2
BH CV ECHO MEAS - LV V1 MAX: 103.8 CM/SEC
BH CV ECHO MEAS - LV V1 MEAN: 60.8 CM/SEC
BH CV ECHO MEAS - LV V1 VTI: 22.3 CM
BH CV ECHO MEAS - LVIDD: 4.4 CM
BH CV ECHO MEAS - LVIDS: 3.3 CM
BH CV ECHO MEAS - LVLD AP2: 8 CM
BH CV ECHO MEAS - LVLD AP4: 8.1 CM
BH CV ECHO MEAS - LVLS AP2: 7.9 CM
BH CV ECHO MEAS - LVLS AP4: 7.4 CM
BH CV ECHO MEAS - LVOT AREA (M): 3.8 CM^2
BH CV ECHO MEAS - LVOT AREA: 3.8 CM^2
BH CV ECHO MEAS - LVOT DIAM: 2.2 CM
BH CV ECHO MEAS - LVPWD: 1.2 CM
BH CV ECHO MEAS - MED PEAK E' VEL: 10 CM/SEC
BH CV ECHO MEAS - MR MAX PG: 60.4 MMHG
BH CV ECHO MEAS - MR MAX VEL: 388.6 CM/SEC
BH CV ECHO MEAS - MV A DUR: 0.12 SEC
BH CV ECHO MEAS - MV A MAX VEL: 77.6 CM/SEC
BH CV ECHO MEAS - MV DEC SLOPE: 426.6 CM/SEC^2
BH CV ECHO MEAS - MV DEC TIME: 0.18 SEC
BH CV ECHO MEAS - MV E MAX VEL: 93.6 CM/SEC
BH CV ECHO MEAS - MV E/A: 1.2
BH CV ECHO MEAS - MV MAX PG: 4.3 MMHG
BH CV ECHO MEAS - MV MEAN PG: 2.3 MMHG
BH CV ECHO MEAS - MV P1/2T MAX VEL: 97 CM/SEC
BH CV ECHO MEAS - MV P1/2T: 66.6 MSEC
BH CV ECHO MEAS - MV V2 MAX: 104.1 CM/SEC
BH CV ECHO MEAS - MV V2 MEAN: 72.1 CM/SEC
BH CV ECHO MEAS - MV V2 VTI: 36 CM
BH CV ECHO MEAS - MVA P1/2T LCG: 2.3 CM^2
BH CV ECHO MEAS - MVA(P1/2T): 3.3 CM^2
BH CV ECHO MEAS - MVA(VTI): 2.3 CM^2
BH CV ECHO MEAS - PA ACC TIME: 0.18 SEC
BH CV ECHO MEAS - PA MAX PG (FULL): 5.4 MMHG
BH CV ECHO MEAS - PA MAX PG: 7.1 MMHG
BH CV ECHO MEAS - PA PR(ACCEL): -0.22 MMHG
BH CV ECHO MEAS - PA V2 MAX: 133 CM/SEC
BH CV ECHO MEAS - PULM A REVS DUR: 0.11 SEC
BH CV ECHO MEAS - PULM A REVS VEL: 24.3 CM/SEC
BH CV ECHO MEAS - PULM DIAS VEL: 55.2 CM/SEC
BH CV ECHO MEAS - PULM S/D: 0.74
BH CV ECHO MEAS - PULM SYS VEL: 40.7 CM/SEC
BH CV ECHO MEAS - PVA(V,A): 1.6 CM^2
BH CV ECHO MEAS - PVA(V,D): 1.6 CM^2
BH CV ECHO MEAS - QP/QS: 0.54
BH CV ECHO MEAS - RAP SYSTOLE: 3 MMHG
BH CV ECHO MEAS - RV MAX PG: 1.7 MMHG
BH CV ECHO MEAS - RV MEAN PG: 0.95 MMHG
BH CV ECHO MEAS - RV V1 MAX: 64.7 CM/SEC
BH CV ECHO MEAS - RV V1 MEAN: 44.2 CM/SEC
BH CV ECHO MEAS - RV V1 VTI: 14.2 CM
BH CV ECHO MEAS - RVOT AREA: 3.2 CM^2
BH CV ECHO MEAS - RVOT DIAM: 2 CM
BH CV ECHO MEAS - RVSP: 24 MMHG
BH CV ECHO MEAS - SI(AO): 296 ML/M^2
BH CV ECHO MEAS - SI(CUBED): 23.5 ML/M^2
BH CV ECHO MEAS - SI(LVOT): 40.6 ML/M^2
BH CV ECHO MEAS - SI(MOD-SP2): 19.7 ML/M^2
BH CV ECHO MEAS - SI(MOD-SP4): 19.2 ML/M^2
BH CV ECHO MEAS - SI(TEICH): 20.8 ML/M^2
BH CV ECHO MEAS - SV(AO): 616.1 ML
BH CV ECHO MEAS - SV(CUBED): 49 ML
BH CV ECHO MEAS - SV(LVOT): 84.6 ML
BH CV ECHO MEAS - SV(MOD-SP2): 41 ML
BH CV ECHO MEAS - SV(MOD-SP4): 40 ML
BH CV ECHO MEAS - SV(RVOT): 45.8 ML
BH CV ECHO MEAS - SV(TEICH): 43.3 ML
BH CV ECHO MEAS - TAPSE (>1.6): 2.6 CM2
BH CV ECHO MEAS - TR MAX VEL: 229.4 CM/SEC
BH CV ECHO MEASUREMENTS AVERAGE E/E' RATIO: 10.4
BH CV XLRA - RV BASE: 3.1 CM
BH CV XLRA - RV LENGTH: 7.2 CM
BH CV XLRA - RV MID: 2.6 CM
BH CV XLRA - TDI S': 8 CM/SEC
LEFT ATRIUM VOLUME INDEX: 22 ML/M2
SINUS: 3.5 CM
STJ: 3.2 CM

## 2019-12-27 PROCEDURE — 93306 TTE W/DOPPLER COMPLETE: CPT

## 2019-12-27 PROCEDURE — 93306 TTE W/DOPPLER COMPLETE: CPT | Performed by: INTERNAL MEDICINE

## 2019-12-29 NOTE — PROGRESS NOTES
Called and spoke with him on 12/27/2019.  The aortic valve replacement looks excellent.  The pericardial effusion is not causing any issues and likely will not get any bigger.    MARQUES

## 2019-12-31 RX ORDER — CYCLOBENZAPRINE HCL 5 MG
5 TABLET ORAL 3 TIMES DAILY PRN
Qty: 90 TABLET | Refills: 0 | OUTPATIENT
Start: 2019-12-31

## 2020-01-20 ENCOUNTER — TREATMENT (OUTPATIENT)
Dept: CARDIAC REHAB | Facility: HOSPITAL | Age: 55
End: 2020-01-20

## 2020-01-20 DIAGNOSIS — Z95.2 S/P AVR (AORTIC VALVE REPLACEMENT): Primary | ICD-10-CM

## 2020-01-20 PROCEDURE — 93798 PHYS/QHP OP CAR RHAB W/ECG: CPT

## 2020-01-22 ENCOUNTER — TREATMENT (OUTPATIENT)
Dept: CARDIAC REHAB | Facility: HOSPITAL | Age: 55
End: 2020-01-22

## 2020-01-22 DIAGNOSIS — Z95.2 S/P AVR (AORTIC VALVE REPLACEMENT): Primary | ICD-10-CM

## 2020-01-22 PROCEDURE — 93798 PHYS/QHP OP CAR RHAB W/ECG: CPT

## 2020-01-29 ENCOUNTER — TREATMENT (OUTPATIENT)
Dept: CARDIAC REHAB | Facility: HOSPITAL | Age: 55
End: 2020-01-29

## 2020-01-29 DIAGNOSIS — Z95.2 S/P AVR (AORTIC VALVE REPLACEMENT): Primary | ICD-10-CM

## 2020-01-29 PROCEDURE — 93798 PHYS/QHP OP CAR RHAB W/ECG: CPT

## 2020-01-29 NOTE — PROGRESS NOTES
Attended Phase II Cardiac Rehab. No medication or health history changes reported. See [facility] for details.

## 2020-02-12 ENCOUNTER — APPOINTMENT (OUTPATIENT)
Dept: CARDIAC REHAB | Facility: HOSPITAL | Age: 55
End: 2020-02-12

## 2020-02-14 ENCOUNTER — APPOINTMENT (OUTPATIENT)
Dept: CARDIAC REHAB | Facility: HOSPITAL | Age: 55
End: 2020-02-14

## 2020-02-17 ENCOUNTER — APPOINTMENT (OUTPATIENT)
Dept: CARDIAC REHAB | Facility: HOSPITAL | Age: 55
End: 2020-02-17

## 2020-02-19 ENCOUNTER — APPOINTMENT (OUTPATIENT)
Dept: CARDIAC REHAB | Facility: HOSPITAL | Age: 55
End: 2020-02-19

## 2020-02-21 ENCOUNTER — APPOINTMENT (OUTPATIENT)
Dept: CARDIAC REHAB | Facility: HOSPITAL | Age: 55
End: 2020-02-21

## 2020-02-24 ENCOUNTER — APPOINTMENT (OUTPATIENT)
Dept: CARDIAC REHAB | Facility: HOSPITAL | Age: 55
End: 2020-02-24

## 2020-02-26 ENCOUNTER — APPOINTMENT (OUTPATIENT)
Dept: CARDIAC REHAB | Facility: HOSPITAL | Age: 55
End: 2020-02-26

## 2020-02-28 ENCOUNTER — APPOINTMENT (OUTPATIENT)
Dept: CARDIAC REHAB | Facility: HOSPITAL | Age: 55
End: 2020-02-28

## 2020-03-09 ENCOUNTER — OFFICE VISIT (OUTPATIENT)
Dept: CARDIOLOGY | Facility: CLINIC | Age: 55
End: 2020-03-09

## 2020-03-09 VITALS
HEART RATE: 91 BPM | SYSTOLIC BLOOD PRESSURE: 150 MMHG | OXYGEN SATURATION: 98 % | WEIGHT: 208.6 LBS | HEIGHT: 71 IN | BODY MASS INDEX: 29.2 KG/M2 | DIASTOLIC BLOOD PRESSURE: 80 MMHG

## 2020-03-09 DIAGNOSIS — Z95.3 STATUS POST AORTIC VALVE REPLACEMENT WITH TISSUE: Primary | ICD-10-CM

## 2020-03-09 DIAGNOSIS — Z86.79 STATUS POST ASCENDING AORTIC ANEURYSM REPAIR: ICD-10-CM

## 2020-03-09 DIAGNOSIS — Z98.890 STATUS POST VENTRICULAR SEPTAL MYECTOMY: ICD-10-CM

## 2020-03-09 DIAGNOSIS — Z98.890 STATUS POST ASCENDING AORTIC ANEURYSM REPAIR: ICD-10-CM

## 2020-03-09 DIAGNOSIS — G47.33 OSA (OBSTRUCTIVE SLEEP APNEA): ICD-10-CM

## 2020-03-09 DIAGNOSIS — I10 HTN (HYPERTENSION), BENIGN: ICD-10-CM

## 2020-03-09 PROCEDURE — 99214 OFFICE O/P EST MOD 30 MIN: CPT | Performed by: NURSE PRACTITIONER

## 2020-03-09 NOTE — PROGRESS NOTES
[facility] CARDIOLOGY  [address]  [facility] [address]  Phone: [phone]      Patient Name: Brigido Caraballo  :1965  Age: 54 y.o.  Primary Cardiologist: Lucio Win MD  Encounter Provider:  KAIT Arciniega      Chief Complaint:   Chief Complaint   Patient presents with   • 3 month followup         HPI  Brigido Caraballo is a 54 y.o. male who presents today for 6-month reevaluation     Pt has a  history significant for asending aortic aneurysm with repair on 10/29/2019, aortic valve stenosis with bicuspid aortic valve with repair on 10/29/2019, hypertension, obstructive sleep apnea, diabetes    Patient was hospitalized 10/29-11/3 where he underwent a sitting and aortic arch aneurysm repair, aortic valve replacement, septal myectomy.    Patient states that he has done fairly well over the past 3 months.  He does report that he has recently had some earache and lightheadedness when turning his head.  He was seen by his primary care physician and found to have cerumen impaction.  He was also found to have a ear infection and was placed on Claritin and amoxicillin.  States he is not taking any decongestants.  Patient does report that he continues to have extreme fatigue and daytime sleepiness every afternoon around 3 PM.  He states that he does have history of sleep apnea but has not used CPAP device for several years.  He states that he sleeps on his side and the mask frequently became dislodged so he stopped using it.  Patient reports that he exercises routinely without any exertional symptoms.  He states at home his blood pressures been averaging in the 130s/80s on his automated machine.  He denies any chest pain, dyspnea with exertion, palpitations, lower extremity edema.    The following portions of the patient's history were reviewed and updated as appropriate: allergies, current medications, past family history, past medical history, past social  "history, past surgical history and problem list.      Review of Systems   Constitution: Positive for malaise/fatigue.   HENT: Positive for ear pain.    Cardiovascular: Negative for chest pain and leg swelling.   Respiratory: Negative for shortness of breath.    Neurological: Positive for light-headedness.   All other systems reviewed and are negative.      OBJECTIVE:     Vitals:    03/09/20 1426   BP: 150/80   Pulse: 91   SpO2: 98%   Weight: 94.6 kg (208 lb 9.6 oz)   Height: 180.3 cm (71\")     Body mass index is 29.09 kg/m².    Physical Exam   Constitutional: He is oriented to person, place, and time. Vital signs are normal. He appears well-developed and well-nourished.   Eyes: Conjunctivae are normal.   Neck: Carotid bruit is not present.   Cardiovascular: Normal rate, regular rhythm and normal heart sounds.   No murmur heard.  Pulmonary/Chest: Effort normal and breath sounds normal.   Abdominal: Normal appearance.   Musculoskeletal: Normal range of motion.   No pedal edema   Neurological: He is alert and oriented to person, place, and time. GCS eye subscore is 4. GCS verbal subscore is 5. GCS motor subscore is 6.   Skin: Skin is warm and dry.   Psychiatric: He has a normal mood and affect. His speech is normal and behavior is normal. Judgment and thought content normal. Cognition and memory are normal.       Procedures    Cardiac Procedures:  1. Lexiscan cardiac stress test 9/25/2015.  No evidence of ischemia.  2. Echocardiogram 9/25/2015.  EF 63%.  Normal diastolic function.  Bicuspid aortic valve.  Mild to moderate aortic stenosis with peak gradient 34 mmHg and mean radiant 20 mmHg  3. Echocardiogram 8/12/2019.  EF 67%.  Grade 1 diastolic dysfunction noted.  Bicuspid aortic valve with moderate to severe aortic valve stenosis.  Maximum pressure gradient 57 mmHg, mean pressure gradient 34 mmHg.  Calculated RVSP 29 mmHg.  A sending aorta dilated measuring 4.1 cm.  4. Myocardial perfusion stress test 8/12/2019.  No " evidence of ischemia.  EF 59%.  ST segment depression 1 mm noted during stress beginning at 3 minutes of stress.  Baseline ST depression.  Patient complains of pain.  5. Cardiac catheterization 9/24/2019.  Normal coronary arteries.  Elevated LV filling pressures 27 mmHg.  Peak to peak gradient across the aortic valve is 53 mmHg.  6. Asending aortic aneurysm repair and aortic valve replacement 10/29/2019.  7. Echocardiogram 12/27/2019.  LVEF 61-65%.  Mild to moderate LVH.  Normal RV cavity size and systolic function.  LAE.  25 mm bovine pericardial bioprosthetic valve present.  Aortic valve peak and mean gradients are within defined limits.  Mild tricuspid valve regurgitation.  Small to moderate posterior pericardial effusion without evidence of tamponade.  Calculated RVSP 24 mmHg.        ASSESSMENT:      Diagnosis Plan   1. Status post aortic valve replacement with tissue     2. Status post ventricular septal myectomy     3. Status post ascending aortic aneurysm repair     4. HTN (hypertension), benign     5. ZENON (obstructive sleep apnea)           PLAN OF CARE:     1. Status post ascending aortic aneurysm repair and aortic valve replacement with ventricular septal myectomy.  Patient currently asymptomatic.  Gradients across aortic valve replacement were checked on echocardiogram 12/2019 and were normal.  Patient was advised that he will need antibiotics before any dental procedures.  He exercises routinely and does not have any exertional symptoms.  2. Hypertension.  Blood pressure elevated in office today at 150/80 and repeat blood pressure 142/90.  Patient states that at home on his automated cuff blood pressure is 130s/80s.  I recommended patient check his blood pressure daily and keep diary.  He will come to the office for blood pressure check in 1 week and will bring with him his automated cuff.  With patient's history of aneurysm repair and AVR it is very important that we have good blood pressure control  with systolic less than 120s.  3. Obstructive sleep apnea.  Patient reports increased fatigue at 3 PM in the afternoons on a daily basis.  He does have history of obstructive sleep apnea but states that he does not use CPAP device as it was frequently coming dislodged as he is a side sleeper.  We did discuss that untreated sleep apnea would cause hypersomnia.  He recently had laboratory studies done with his family physician and we will check to make sure thyroid level was done.  4. Follow-up with Dr. Win in 6 weeks.  Sooner for problems or complications.             Discharge Medications           Accurate as of March 9, 2020  3:00 PM. If you have any questions, ask your nurse or doctor.               Continue These Medications      Instructions Start Date   ANTIHISTAMINE PO   Oral      aspirin 81 MG EC tablet   81 mg, Oral, Daily      atorvastatin 10 MG tablet  Commonly known as:  LIPITOR   10 mg, Oral, Nightly      CINNAMON PO   1 tablet, Oral, As Needed      Coenzyme Q10 10 MG capsule   200 mg, Oral, Daily      cyclobenzaprine 5 MG tablet  Commonly known as:  FLEXERIL   5 mg, Oral, 3 Times Daily PRN      FLAX SEEDS PO   1,000 mg, Oral, Daily      Garlic 500 MG tablet   1 tablet, Oral, Daily      Ginger Root 500 MG capsule   550 mg, Oral, Daily      metFORMIN 1000 MG tablet  Commonly known as:  GLUCOPHAGE   1,000 mg, Oral, 2 Times Daily With Meals, IF BS IS LOW WILL ONLY TAKE 500 MG      propranolol 10 MG tablet  Commonly known as:  INDERAL   10 mg, Oral, 3 Times Daily      vitamin D3 125 MCG (5000 UT) capsule capsule   5,000 Units, Oral, Daily             Thank you for allowing me to participate in the care of your patient,         KAIT Arciniega  [facility]  03/09/20  3:00 PM    **Joel Disclaimer:**  Much of this encounter note is an electronic transcription/translation of spoken language to printed text. The electronic translation of spoken language may permit erroneous, or at  times, nonsensical words or phrases to be inadvertently transcribed. Although I have reviewed the note for such errors, some may still exist.

## 2020-03-16 ENCOUNTER — CLINICAL SUPPORT (OUTPATIENT)
Dept: CARDIOLOGY | Facility: CLINIC | Age: 55
End: 2020-03-16

## 2020-03-16 VITALS — HEART RATE: 80 BPM | SYSTOLIC BLOOD PRESSURE: 130 MMHG | DIASTOLIC BLOOD PRESSURE: 86 MMHG

## 2020-03-16 RX ORDER — AMLODIPINE BESYLATE 2.5 MG/1
2.5 TABLET ORAL DAILY
Qty: 30 TABLET | Refills: 5 | Status: SHIPPED | OUTPATIENT
Start: 2020-03-16 | End: 2020-03-18 | Stop reason: DRUGHIGH

## 2020-03-16 NOTE — PROGRESS NOTES
Pt presents to office for blood pressure and heart rate check, per KAIT Camargo suggestion. Pt's bp today was 146/98  Rt arm, Hr 80 , 144/96   Lt arm and pt's automated cuff was 130/86.  KAIT Camargo reviewed bp's and added amlodipine 2.5 mg daily. This was sent to pt's requested pharmacy. Lilia spoke with pt and advised him of changes.

## 2020-03-18 ENCOUNTER — TELEPHONE (OUTPATIENT)
Dept: CARDIOLOGY | Facility: CLINIC | Age: 55
End: 2020-03-18

## 2020-03-18 RX ORDER — AMLODIPINE BESYLATE 5 MG/1
5 TABLET ORAL DAILY
Qty: 30 TABLET | Refills: 11 | Status: SHIPPED | OUTPATIENT
Start: 2020-03-18 | End: 2020-10-14 | Stop reason: SDUPTHER

## 2020-03-18 NOTE — TELEPHONE ENCOUNTER
Have him increase to 5 mg daily.  And please remind him that it may take up to 5 days to see results of this medication.

## 2020-03-18 NOTE — TELEPHONE ENCOUNTER
03/18/20  11:35 AM  Brigido Caraballo  1965  Home Phone [phone]   Mobile [phone]       Brigido Caraballo is a patient of Dr Luna.  He is calling in to let you know that he is still experiencing high bp after starting the amlodipine on Monday. I explained to him it may take up to 5 days for his bp to level out.    bp this am 151/96 hr 73    Cardiac meds  Amlodipine 2.5mg daily- he takes at night before bed.    He stated he has a slight headache and it having a hard time focusing, but added that he forgot his glasses today.    Please advise on how to proceed  Thanks  Maria Luisa Dunn RN  Triage nurse

## 2020-04-10 ENCOUNTER — TELEPHONE (OUTPATIENT)
Dept: CARDIOLOGY | Facility: CLINIC | Age: 55
End: 2020-04-10

## 2020-04-10 NOTE — TELEPHONE ENCOUNTER
Spoke with Dr Win in regards to patient. Patient is to discontinue Propanolol and start Coreg 3.125mg 2x a day.  Patient also instructed to only take BP 1-2 hours after he takes medication and record them along with HR and call office next Wednesday/Thursday with readings. Explained that it could take 5-6 days for medication to take effect.  When patient was called he was very anxious and had taken his BP again and it was 160's/110's and was en route to ER. Gave patient Dr Vides instructions and encouraged patient to try and refrain from taking BP too often due to the fact that seeing it elevated can increase his anxiousness and make BP even higher. He verbalized understanding.Advised him that it was still his right to go to the ER if he felt he really needed to.  Patient requested that we send rx to [facility] so he can take it sooner and they were already on campus. [facility] was called and explained situation , gave rx order of Coreg 3.125mg 2x a day to Pharmacist also informed him that patient was on campus and very anxious.  Pharmacist assured that he could have it ready within 15 mins and would call patient for curide service due to COVID pandemic.  Called patient and explained above. He verbalized understanding and will wait for call.    Aurea Watson RN  [facility] Triage Nurse

## 2020-04-10 NOTE — TELEPHONE ENCOUNTER
"04/10/20  12:19 PM  Brigido Caraballo  1965  Home Phone [phone]   Mobile [phone]       Brigido Caraballo is a patient of Dr Win who called office today concerned with blood pressure readings. States he can tell when his BP is high. Its been running 150's/100's today.  He had an aortic aneurysm repair last September and he is scared the grafts may\"blow\"  Patient states he was placed on Amlodipine 5mg daily 2 weeks ago.  He didn't know whether he need ed to go to the hospital. Instructed patient to try and relax as much as possible and he would be getting a return call as soon as possible.    Please advise    Thank you  Aurea Watson RN  [facility] Triage Nurse,  "

## 2020-04-13 ENCOUNTER — TELEPHONE (OUTPATIENT)
Dept: CARDIOLOGY | Facility: CLINIC | Age: 55
End: 2020-04-13

## 2020-04-13 NOTE — TELEPHONE ENCOUNTER
The reason that I did not wean off the propranolol is because the carvedilol was to take it's place.  This would have minimized the increased heart rate and shakiness.  I really think it would be unlikely that he would bottom out with this.  His mother is complicated, and has many other medical issues.  I would have him try to take it as prescribed as a trial to see how he feels.  The elevated heart rate will also go down if taking it regularly.  Thanks    GM

## 2020-04-13 NOTE — TELEPHONE ENCOUNTER
"Patient called office again this morning to report BP and noncompliance with taking Coreg.  BP Taken BEFORE Coreg doses   Sat am 112/75-did not take Coreg \"afraid in may bottom him out cause thas happened to my mom before\"  Sat /75-did not take    BP readings again before taking dose  Sun /78-did not take dose  Sun PM  128/82-did not take dose    This morning /84 so he took the dose.    Concerned that he should have \"weaned off\" Propanolol but he stopped taking as instructed to do on Friday. States HR has been in 80's-90's    States that he has felt  Like his \"heart has been racing and shaky \"sometimes\"  States that \"read about the effects of going off Propanolol and those the 2 were listed.\"    Please advise.    Thanks  Aurea Watson RN  [facility] Triage Nurse       "

## 2020-04-13 NOTE — TELEPHONE ENCOUNTER
"Called patient back with Dr Vides thoughts and recommendations. Patient agreed to take Carvedilol 3.125 2 x a daily as prescribed. It was not reported in last message to Dr Byrd but patient did state the he has a steady headache on right side of his head. He has not taken anything for it as he is \"concerned with the virus stuff that they advised against ibuprofen\" Explained that he could take the ibuprofen or Tylenol since he does seem to appear to be experiencing any of the Covid symptoms. Also the CDC states there is no evidence present at this time stating that ibuprofen increases risks.   Stated that if the headaches don't go away then he will call office back. Also him and wife questioned how low was to low as far as BP to not take it. I explained that unless he is symptomatic( lightheaded, dizziness, nausea) that there would be no reason to take BP prior to Coreg and take BP 1-2 hours after meds as previously instructed.    Aurea Watson RN  [facility] Triage Nurse    "

## 2020-04-14 ENCOUNTER — TELEPHONE (OUTPATIENT)
Dept: CARDIOLOGY | Facility: CLINIC | Age: 55
End: 2020-04-14

## 2020-05-11 RX ORDER — CARVEDILOL 3.12 MG/1
3.12 TABLET ORAL 2 TIMES DAILY
Qty: 60 TABLET | Refills: 3 | Status: SHIPPED | OUTPATIENT
Start: 2020-05-11 | End: 2020-07-20 | Stop reason: SDUPTHER

## 2020-06-18 ENCOUNTER — OFFICE VISIT (OUTPATIENT)
Dept: CARDIOLOGY | Facility: CLINIC | Age: 55
End: 2020-06-18

## 2020-06-18 VITALS
HEIGHT: 71 IN | BODY MASS INDEX: 29.54 KG/M2 | SYSTOLIC BLOOD PRESSURE: 124 MMHG | WEIGHT: 211 LBS | OXYGEN SATURATION: 98 % | HEART RATE: 73 BPM | DIASTOLIC BLOOD PRESSURE: 72 MMHG

## 2020-06-18 DIAGNOSIS — I10 ESSENTIAL HYPERTENSION: ICD-10-CM

## 2020-06-18 DIAGNOSIS — Z86.79 STATUS POST ASCENDING AORTIC ANEURYSM REPAIR: ICD-10-CM

## 2020-06-18 DIAGNOSIS — Z98.890 STATUS POST ASCENDING AORTIC ANEURYSM REPAIR: ICD-10-CM

## 2020-06-18 DIAGNOSIS — Z98.890 STATUS POST VENTRICULAR SEPTAL MYECTOMY: ICD-10-CM

## 2020-06-18 DIAGNOSIS — Q23.1 CONGENITAL BICUSPID AORTIC VALVE: Primary | ICD-10-CM

## 2020-06-18 DIAGNOSIS — Z95.3 STATUS POST AORTIC VALVE REPLACEMENT WITH TISSUE VALVE: ICD-10-CM

## 2020-06-18 PROCEDURE — 99214 OFFICE O/P EST MOD 30 MIN: CPT | Performed by: INTERNAL MEDICINE

## 2020-06-24 NOTE — PROGRESS NOTES
Date of Office Visit:  2020  Encounter Provider: Brayden Win MD  Place of Service: [facility] CARDIOLOGY  Patient Name: Brigido Caraballo  :1965    Chief complaint: Follow-up for congenital bicuspid aortic valve, ascending aortic   aneurysm repair, tissue aortic valve replacement, and septal myectomy.    History of Present Illness:    I again had the pleasure of seeing the patient in cardiology office on 2020.  He is a very pleasant 55 year-old white male with a past medical history significant for a congenital bicuspid aortic valve, tissue aortic valve replacement, septal myectomy, and ascending aortic aneurysm repair who presents for follow-up.  The patient had formerly followed with Dr. Jorge Alberto Forde at [facility], but switched care to me on 2018.    The patient had an echocardiogram performed on 2019 which showed progression in his aortic stenosis to moderate to severe.  He also had a noncontrast CT of his chest on 2019 which showed a 5 cm ascending aortic aneurysm.  Given this, he was referred for surgical evaluation.  He underwent a left heart catheterization on 2019 which showed angiographically normal coronary arteries.  He ultimately underwent an ascending aortic aneurysm repair with a 28 mm Dacron interposition graft, proximal arch repair with a 28 mm graft with anastomosis in a leonora-arch configuration, tissue aortic valve replacement with a 25 mm Magna pericardial valve, and septal myectomy on 10/29/2019 by Dr. Shell.  He did well postoperatively, and did not have any significant complications.    The patient presents today for follow-up.  He has not had any chest pain or shortness of breath.  His main complaint is that he has been having severe cramps in his lower extremities intermittently.  These are mainly occurring at night.  He does get fatigued and tired around 3 PM each day.      Past Medical History:   Diagnosis Date    • Abnormal EKG     Baseline anterolateral T wave abnormalities    • Aortic aneurysm (CMS/HCC)    • Aortic stenosis    • Arthritis     THUMBS   • Bicuspid aortic valve    • Diabetes (CMS/HCC) 2011   • Heart murmur    • Hyperlipidemia    • Hypertension    • ZENON (obstructive sleep apnea)     Intolerant to CPAP  TESTED 20 YRS AGO  NO PROBLEMS NOW USES  NOSE STRIP AT NIGHT       Past Surgical History:   Procedure Laterality Date   • ASCENDING ARCH/HEMIARCH REPLACEMENT N/A 10/29/2019    Procedure: INTRAOPERATIVE RICHARD; STERNOTOMY THORACIC AORTIC ANEURYSM ASCENDING ARCH REPAIR WITH CIRCULATORY ARREST; AORTIC VALVE REPLACEMENT AND PRP.;  Surgeon: Felipe Shell MD;  Location: [facility] OR;  Service: Cardiothoracic   • CARDIAC CATHETERIZATION N/A 9/24/2019    Procedure: Left Heart Cath (pre-op aortic valve replacement and aortic aneurysm repair);  Surgeon: Crow Ely MD;  Location: [facility] CATH INVASIVE LOCATION;  Service: Cardiovascular       Current Outpatient Medications on File Prior to Visit   Medication Sig Dispense Refill   • amLODIPine (NORVASC) 5 MG tablet Take 1 tablet by mouth Daily. 30 tablet 11   • aspirin 81 MG EC tablet Take 81 mg by mouth Daily.     • atorvastatin (LIPITOR) 10 MG tablet Take 10 mg by mouth Every Night.     • carvedilol (COREG) 3.125 MG tablet Take 1 tablet by mouth 2 (Two) Times a Day. 60 tablet 3   • Cholecalciferol (VITAMIN D3) 5000 units capsule capsule Take 5,000 Units by mouth Daily.     • CINNAMON PO Take 1 tablet by mouth As Needed.     • Coenzyme Q10 10 MG capsule Take 200 mg by mouth Daily.     • cyclobenzaprine (FLEXERIL) 5 MG tablet Take 1 tablet by mouth 3 (Three) Times a Day As Needed for Muscle Spasms. 90 tablet 0   • Flaxseed, Linseed, (FLAX SEEDS PO) Take 1,000 mg by mouth Daily.     • Garlic 500 MG tablet Take 1 tablet by mouth Daily.     • Ginger, Zingiber officinalis, (GINGER ROOT) 500 MG capsule Take 550 mg by mouth Daily.     • metFORMIN (GLUCOPHAGE) 1000 MG  "tablet Take 1,000 mg by mouth 2 (Two) Times a Day With Meals. IF BS IS LOW WILL ONLY TAKE 500 MG     • Triprolidine-Pseudoephedrine (ANTIHISTAMINE PO) Take  by mouth.       No current facility-administered medications on file prior to visit.      Allergies as of 2020   • (No Known Allergies)     Social History     Socioeconomic History   • Marital status:      Spouse name: Not on file   • Number of children: Not on file   • Years of education: Not on file   • Highest education level: Not on file   Tobacco Use   • Smoking status: Former Smoker     Packs/day: 1.00     Years: 20.00     Pack years: 20.00     Types: Cigarettes     Last attempt to quit:      Years since quittin.4   • Smokeless tobacco: Never Used   • Tobacco comment: Smoked 1 ppd for 25 years - quit 2011   Substance and Sexual Activity   • Alcohol use: Yes     Frequency: Never     Comment: SOCIALLY   • Drug use: No   • Sexual activity: Defer     Family History   Problem Relation Age of Onset   • Coronary artery disease Mother    • Aortic stenosis Mother    • Stroke Mother    • Diabetes Father    • Stroke Father    • Malig Hyperthermia Neg Hx        Review of Systems   Constitution: Positive for malaise/fatigue.   Skin: Positive for itching.   Musculoskeletal: Positive for muscle cramps, muscle weakness and myalgias.   Genitourinary: Positive for decreased libido.   Neurological: Positive for excessive daytime sleepiness.   All other systems reviewed and are negative.     Objective:     Vitals:    20 1335   BP: 124/72   Pulse: 73   SpO2: 98%   Weight: 95.7 kg (211 lb)   Height: 180.3 cm (70.98\")     Body mass index is 29.44 kg/m².    Physical Exam   Constitutional: He is oriented to person, place, and time. He appears well-developed and well-nourished.   HENT:   Head: Normocephalic and atraumatic.   Eyes: Conjunctivae are normal.   Neck: Neck supple.   Cardiovascular: Normal rate and regular rhythm. Exam reveals no gallop and " no friction rub.   Murmur heard.   Systolic murmur is present with a grade of 2/6 at the upper right sternal border and upper left sternal border.  Pulmonary/Chest: Effort normal and breath sounds normal.   Abdominal: Soft. There is no tenderness.   Musculoskeletal: He exhibits no edema.   Neurological: He is alert and oriented to person, place, and time.   Skin: Skin is warm.   Psychiatric: He has a normal mood and affect. His behavior is normal.     Lab Review:   Procedures    Cardiac Procedures:  1.  Echocardiogram on 8/12/2019: Ejection fraction was 67%.  There was mild LVH.  There was grade 1 diastolic dysfunction.  The right ventricle was normal.  The left atrium was mildly enlarged.  The bicuspid aortic valve was heavily calcified with moderate to severe aortic stenosis.  There was mild tricuspid regurgitation.  2.  CT scan of the chest without contrast on 9/9/2019: There was a 5 cm ascending aortic aneurysm.  3.  Left heart catheterization on 9/24/2019: Angiographically normal coronary arteries.  4.  Status post ascending aortic aneurysm repair with a 28 mm Dacron interposition graft, proximal arch repair with a 28 mm graft with anastomosis in a leonora-arch configuration, tissue aortic valve replacement with a 25 mm Magna pericardial valve, and septal myectomy on 10/29/2019 by Dr. Shell.   5.  Echocardiogram on 12/27/2019: Ejection fraction was 60 to 65%.  There was mild to moderate LVH.  The left atrium was borderline dilated.  The bioprosthetic aortic valve was normal with normal gradients.  There was mild tricuspid regurgitation.    Assessment:       Diagnosis Plan   1. Congenital bicuspid aortic valve  Adult Transthoracic Echo Complete W/ Cont if Necessary Per Protocol   2. Status post ascending aortic aneurysm repair  Adult Transthoracic Echo Complete W/ Cont if Necessary Per Protocol   3. Status post aortic valve replacement with tissue valve  Adult Transthoracic Echo Complete W/ Cont if Necessary Per  Protocol   4. Status post ventricular septal myectomy  Adult Transthoracic Echo Complete W/ Cont if Necessary Per Protocol   5. Essential hypertension       Plan:       With regards to the myalgias and severe muscle cramps.  I highly suspect this is from his Lipitor.  He is only taking 10 mg/day, and he had normal coronary arteries on his heart catheterization.  I have recommended that he discontinue this.  Another statin can always be added later if needed.    His last echocardiogram looked good in December 2019.  I will plan on another echo before the end of this year at his next visit.  He will continue on the aspirin for his history of tissue aortic valve replacement.  He does need prophylactic antibiotics prior to dental appointments and dental cleanings, and I again reminded him of this.  He will need to follow-up with Dr. Shell for his aortic aneurysm repair.  His blood pressure is excellent today at 124/74 on the amlodipine and carvedilol.  I will see him back in the office in 6 months unless other issues arise.

## 2020-07-20 RX ORDER — CARVEDILOL 3.12 MG/1
3.12 TABLET ORAL 2 TIMES DAILY
Qty: 60 TABLET | Refills: 3 | Status: SHIPPED | OUTPATIENT
Start: 2020-07-20 | End: 2020-09-14 | Stop reason: SDUPTHER

## 2020-09-14 RX ORDER — CARVEDILOL 3.12 MG/1
3.12 TABLET ORAL 2 TIMES DAILY
Qty: 60 TABLET | Refills: 3 | Status: SHIPPED | OUTPATIENT
Start: 2020-09-14 | End: 2021-03-31

## 2020-10-14 RX ORDER — AMLODIPINE BESYLATE 5 MG/1
5 TABLET ORAL DAILY
Qty: 30 TABLET | Refills: 11 | Status: SHIPPED | OUTPATIENT
Start: 2020-10-14 | End: 2021-11-17

## 2020-10-14 NOTE — TELEPHONE ENCOUNTER
Patient calling requesting refill.    Patient reports he is completely out of this med.    Please advise?    Thank you,   Tabitha Evans LPN

## 2020-11-30 ENCOUNTER — OFFICE VISIT (OUTPATIENT)
Dept: CARDIOLOGY | Facility: CLINIC | Age: 55
End: 2020-11-30

## 2020-11-30 ENCOUNTER — HOSPITAL ENCOUNTER (OUTPATIENT)
Dept: CARDIOLOGY | Facility: HOSPITAL | Age: 55
Discharge: HOME | End: 2020-11-30
Admitting: INTERNAL MEDICINE

## 2020-11-30 VITALS
HEART RATE: 77 BPM | SYSTOLIC BLOOD PRESSURE: 126 MMHG | WEIGHT: 208.6 LBS | OXYGEN SATURATION: 100 % | BODY MASS INDEX: 29.2 KG/M2 | HEIGHT: 71 IN | DIASTOLIC BLOOD PRESSURE: 78 MMHG

## 2020-11-30 VITALS
WEIGHT: 211 LBS | DIASTOLIC BLOOD PRESSURE: 78 MMHG | BODY MASS INDEX: 29.54 KG/M2 | HEIGHT: 71 IN | SYSTOLIC BLOOD PRESSURE: 130 MMHG

## 2020-11-30 DIAGNOSIS — Q23.1 CONGENITAL BICUSPID AORTIC VALVE: ICD-10-CM

## 2020-11-30 DIAGNOSIS — Z95.3 STATUS POST AORTIC VALVE REPLACEMENT WITH TISSUE VALVE: ICD-10-CM

## 2020-11-30 DIAGNOSIS — Z98.890 STATUS POST ASCENDING AORTIC ANEURYSM REPAIR: ICD-10-CM

## 2020-11-30 DIAGNOSIS — Z86.79 STATUS POST ASCENDING AORTIC ANEURYSM REPAIR: ICD-10-CM

## 2020-11-30 DIAGNOSIS — Q23.1 CONGENITAL BICUSPID AORTIC VALVE: Primary | ICD-10-CM

## 2020-11-30 DIAGNOSIS — Z98.890 STATUS POST VENTRICULAR SEPTAL MYECTOMY: ICD-10-CM

## 2020-11-30 PROCEDURE — 93306 TTE W/DOPPLER COMPLETE: CPT | Performed by: INTERNAL MEDICINE

## 2020-11-30 PROCEDURE — 93306 TTE W/DOPPLER COMPLETE: CPT

## 2020-11-30 PROCEDURE — 99213 OFFICE O/P EST LOW 20 MIN: CPT | Performed by: INTERNAL MEDICINE

## 2020-11-30 RX ORDER — TAMSULOSIN HYDROCHLORIDE 0.4 MG/1
CAPSULE ORAL
COMMUNITY
Start: 2020-10-14

## 2020-12-01 LAB
AORTIC ARCH: 2.9 CM
AORTIC DIMENSIONLESS INDEX: 0.7 (DI)
ASCENDING AORTA: 2.9 CM
BH CV ECHO MEAS - ACS: 1.5 CM
BH CV ECHO MEAS - AO ACC TIME: 0.09 SEC
BH CV ECHO MEAS - AO MAX PG (FULL): 17.2 MMHG
BH CV ECHO MEAS - AO MAX PG: 24 MMHG
BH CV ECHO MEAS - AO MEAN PG (FULL): 8.4 MMHG
BH CV ECHO MEAS - AO MEAN PG: 12 MMHG
BH CV ECHO MEAS - AO ROOT AREA (BSA CORRECTED): 1.6
BH CV ECHO MEAS - AO ROOT AREA: 8.8 CM^2
BH CV ECHO MEAS - AO ROOT DIAM: 3.3 CM
BH CV ECHO MEAS - AO V2 MAX: 244.3 CM/SEC
BH CV ECHO MEAS - AO V2 MEAN: 158.3 CM/SEC
BH CV ECHO MEAS - AO V2 VTI: 56.4 CM
BH CV ECHO MEAS - ASC AORTA: 2.9 CM
BH CV ECHO MEAS - AVA(I,A): 1.8 CM^2
BH CV ECHO MEAS - AVA(I,D): 1.8 CM^2
BH CV ECHO MEAS - AVA(V,A): 2 CM^2
BH CV ECHO MEAS - AVA(V,D): 2 CM^2
BH CV ECHO MEAS - BSA(HAYCOCK): 2.2 M^2
BH CV ECHO MEAS - BSA: 2.1 M^2
BH CV ECHO MEAS - BZI_BMI: 30.3 KILOGRAMS/M^2
BH CV ECHO MEAS - BZI_METRIC_HEIGHT: 177.8 CM
BH CV ECHO MEAS - BZI_METRIC_WEIGHT: 95.7 KG
BH CV ECHO MEAS - EDV(MOD-SP2): 51 ML
BH CV ECHO MEAS - EDV(MOD-SP4): 61 ML
BH CV ECHO MEAS - EDV(TEICH): 82.1 ML
BH CV ECHO MEAS - EF(CUBED): 77.9 %
BH CV ECHO MEAS - EF(MOD-BP): 61 %
BH CV ECHO MEAS - EF(MOD-SP2): 56.9 %
BH CV ECHO MEAS - EF(MOD-SP4): 67.2 %
BH CV ECHO MEAS - EF(TEICH): 70.4 %
BH CV ECHO MEAS - ESV(MOD-SP2): 22 ML
BH CV ECHO MEAS - ESV(MOD-SP4): 20 ML
BH CV ECHO MEAS - ESV(TEICH): 24.3 ML
BH CV ECHO MEAS - FS: 39.5 %
BH CV ECHO MEAS - IVS/LVPW: 1
BH CV ECHO MEAS - IVSD: 1.4 CM
BH CV ECHO MEAS - LAT PEAK E' VEL: 12.2 CM/SEC
BH CV ECHO MEAS - LV DIASTOLIC VOL/BSA (35-75): 28.6 ML/M^2
BH CV ECHO MEAS - LV MASS(C)D: 226.4 GRAMS
BH CV ECHO MEAS - LV MASS(C)DI: 106 GRAMS/M^2
BH CV ECHO MEAS - LV MAX PG: 6.7 MMHG
BH CV ECHO MEAS - LV MEAN PG: 3.5 MMHG
BH CV ECHO MEAS - LV SYSTOLIC VOL/BSA (12-30): 9.4 ML/M^2
BH CV ECHO MEAS - LV V1 MAX: 129.3 CM/SEC
BH CV ECHO MEAS - LV V1 MEAN: 86.3 CM/SEC
BH CV ECHO MEAS - LV V1 VTI: 27.3 CM
BH CV ECHO MEAS - LVIDD: 4.3 CM
BH CV ECHO MEAS - LVIDS: 2.6 CM
BH CV ECHO MEAS - LVLD AP2: 6.6 CM
BH CV ECHO MEAS - LVLD AP4: 6.4 CM
BH CV ECHO MEAS - LVLS AP2: 5.9 CM
BH CV ECHO MEAS - LVLS AP4: 5.2 CM
BH CV ECHO MEAS - LVOT AREA (M): 3.8 CM^2
BH CV ECHO MEAS - LVOT AREA: 3.8 CM^2
BH CV ECHO MEAS - LVOT DIAM: 2.2 CM
BH CV ECHO MEAS - LVPWD: 1.4 CM
BH CV ECHO MEAS - MED PEAK E' VEL: 9 CM/SEC
BH CV ECHO MEAS - MV A MAX VEL: 91.8 CM/SEC
BH CV ECHO MEAS - MV DEC SLOPE: 252.7 CM/SEC^2
BH CV ECHO MEAS - MV DEC TIME: 0.18 SEC
BH CV ECHO MEAS - MV E MAX VEL: 91.1 CM/SEC
BH CV ECHO MEAS - MV E/A: 0.99
BH CV ECHO MEAS - MV MAX PG: 4 MMHG
BH CV ECHO MEAS - MV MEAN PG: 2.3 MMHG
BH CV ECHO MEAS - MV P1/2T MAX VEL: 51.3 CM/SEC
BH CV ECHO MEAS - MV P1/2T: 59.5 MSEC
BH CV ECHO MEAS - MV V2 MAX: 100.4 CM/SEC
BH CV ECHO MEAS - MV V2 MEAN: 71.3 CM/SEC
BH CV ECHO MEAS - MV V2 VTI: 26.2 CM
BH CV ECHO MEAS - MVA P1/2T LCG: 4.3 CM^2
BH CV ECHO MEAS - MVA(P1/2T): 3.7 CM^2
BH CV ECHO MEAS - MVA(VTI): 3.9 CM^2
BH CV ECHO MEAS - PA ACC TIME: 0.12 SEC
BH CV ECHO MEAS - PA MAX PG (FULL): 5.3 MMHG
BH CV ECHO MEAS - PA MAX PG: 8.9 MMHG
BH CV ECHO MEAS - PA PR(ACCEL): 25.5 MMHG
BH CV ECHO MEAS - PA V2 MAX: 149.3 CM/SEC
BH CV ECHO MEAS - PULM A REVS DUR: 0.14 SEC
BH CV ECHO MEAS - PULM A REVS VEL: 70.3 CM/SEC
BH CV ECHO MEAS - PULM DIAS VEL: 43.5 CM/SEC
BH CV ECHO MEAS - PULM S/D: 1.2
BH CV ECHO MEAS - PULM SYS VEL: 53 CM/SEC
BH CV ECHO MEAS - PVA(V,A): 2.3 CM^2
BH CV ECHO MEAS - PVA(V,D): 2.3 CM^2
BH CV ECHO MEAS - QP/QS: 0.6
BH CV ECHO MEAS - RAP SYSTOLE: 3 MMHG
BH CV ECHO MEAS - RV MAX PG: 3.6 MMHG
BH CV ECHO MEAS - RV MEAN PG: 1.8 MMHG
BH CV ECHO MEAS - RV V1 MAX: 94.6 CM/SEC
BH CV ECHO MEAS - RV V1 MEAN: 63.3 CM/SEC
BH CV ECHO MEAS - RV V1 VTI: 17.4 CM
BH CV ECHO MEAS - RVOT AREA: 3.6 CM^2
BH CV ECHO MEAS - RVOT DIAM: 2.1 CM
BH CV ECHO MEAS - RVSP: 30 MMHG
BH CV ECHO MEAS - SI(AO): 232.3 ML/M^2
BH CV ECHO MEAS - SI(CUBED): 28.6 ML/M^2
BH CV ECHO MEAS - SI(LVOT): 48 ML/M^2
BH CV ECHO MEAS - SI(MOD-SP2): 13.6 ML/M^2
BH CV ECHO MEAS - SI(MOD-SP4): 19.2 ML/M^2
BH CV ECHO MEAS - SI(TEICH): 27 ML/M^2
BH CV ECHO MEAS - SUP REN AO DIAM: 2.3 CM
BH CV ECHO MEAS - SV(AO): 496.1 ML
BH CV ECHO MEAS - SV(CUBED): 61 ML
BH CV ECHO MEAS - SV(LVOT): 102.5 ML
BH CV ECHO MEAS - SV(MOD-SP2): 29 ML
BH CV ECHO MEAS - SV(MOD-SP4): 41 ML
BH CV ECHO MEAS - SV(RVOT): 61.8 ML
BH CV ECHO MEAS - SV(TEICH): 57.8 ML
BH CV ECHO MEAS - TAPSE (>1.6): 1.4 CM
BH CV ECHO MEAS - TR MAX VEL: 257.9 CM/SEC
BH CV ECHO MEASUREMENTS AVERAGE E/E' RATIO: 8.59
BH CV XLRA - RV BASE: 3.2 CM
BH CV XLRA - RV LENGTH: 5.9 CM
BH CV XLRA - RV MID: 1.5 CM
BH CV XLRA - TDI S': 10.4 CM/SEC
LEFT ATRIUM VOLUME INDEX: 16 ML/M2
MAXIMAL PREDICTED HEART RATE: 167 BPM
SINUS: 2.7 CM
STJ: 2.5 CM
STRESS TARGET HR: 142 BPM

## 2020-12-14 NOTE — PROGRESS NOTES
Date of Office Visit:  2020  Encounter Provider: Brayden Win MD  Place of Service: [facility]  Patient Name: Brigido Caraballo  :1965    Chief complaint: Follow-up for congenital bicuspid aortic valve, ascending aortic   aneurysm repair, tissue aortic valve replacement, and septal myectomy.    History of Present Illness:    I again had the pleasure of seeing the patient in cardiology office on 2020.  He is a very pleasant 55 year-old white male with a past medical history significant for a congenital bicuspid aortic valve, tissue aortic valve replacement, septal myectomy, and ascending aortic aneurysm repair who presents for follow-up.  The patient had formerly followed with Dr. Jorge Alberto Forde at [facility], but switched care to me on 2018.    The patient had an echocardiogram performed on 2019 which showed progression in his aortic stenosis to moderate to severe.  He also had a noncontrast CT of his chest on 2019 which showed a 5 cm ascending aortic aneurysm.  Given this, he was referred for surgical evaluation.  He underwent a left heart catheterization on 2019 which showed angiographically normal coronary arteries.  He ultimately underwent an ascending aortic aneurysm repair with a 28 mm Dacron interposition graft, proximal arch repair with a 28 mm graft with anastomosis in a leonora-arch configuration, tissue aortic valve replacement with a 25 mm Magna pericardial valve, and septal myectomy on 10/29/2019 by Dr. Shell.  He did well postoperatively, and did not have any significant complications.    The patient presents today for follow-up.  He has been doing fairly well.  Since stopping his statin medication, his muscle cramps have improved significantly.  He states that his thumb joints were nearly crippled, but he has full mobility now.  He has had no chest pain or significant shortness of breath.  He did have labs with   [facility]'s office recently.      Past Medical History:   Diagnosis Date   • Abnormal EKG     Baseline anterolateral T wave abnormalities    • Aortic aneurysm (CMS/HCC)    • Aortic stenosis    • Arthritis     THUMBS   • Bicuspid aortic valve    • Diabetes (CMS/HCC) 2011   • Heart murmur    • Hyperlipidemia    • Hypertension    • ZENON (obstructive sleep apnea)     Intolerant to CPAP  TESTED 20 YRS AGO  NO PROBLEMS NOW USES  NOSE STRIP AT NIGHT       Past Surgical History:   Procedure Laterality Date   • ASCENDING ARCH/HEMIARCH REPLACEMENT N/A 10/29/2019    Procedure: INTRAOPERATIVE RICHARD; STERNOTOMY THORACIC AORTIC ANEURYSM ASCENDING ARCH REPAIR WITH CIRCULATORY ARREST; AORTIC VALVE REPLACEMENT AND PRP.;  Surgeon: Felipe Shell MD;  Location: [facility] MAIN OR;  Service: Cardiothoracic   • CARDIAC CATHETERIZATION N/A 9/24/2019    Procedure: Left Heart Cath (pre-op aortic valve replacement and aortic aneurysm repair);  Surgeon: Crow Ely MD;  Location:  [facility]ATH INVASIVE LOCATION;  Service: Cardiovascular       Current Outpatient Medications on File Prior to Visit   Medication Sig Dispense Refill   • amLODIPine (NORVASC) 5 MG tablet Take 1 tablet by mouth Daily. 30 tablet 11   • aspirin 81 MG EC tablet Take 81 mg by mouth Daily.     • carvedilol (COREG) 3.125 MG tablet Take 1 tablet by mouth 2 (Two) Times a Day. 60 tablet 3   • Cholecalciferol (VITAMIN D3) 5000 units capsule capsule Take 5,000 Units by mouth Daily.     • CINNAMON PO Take 1 tablet by mouth As Needed.     • Coenzyme Q10 10 MG capsule Take 200 mg by mouth Daily.     • cyclobenzaprine (FLEXERIL) 5 MG tablet Take 1 tablet by mouth 3 (Three) Times a Day As Needed for Muscle Spasms. 90 tablet 0   • Flaxseed, Linseed, (FLAX SEEDS PO) Take 1,000 mg by mouth Daily.     • Garlic 500 MG tablet Take 1 tablet by mouth Daily.     • Ginger, Zingiber officinalis, (GINGER ROOT) 500 MG capsule Take 550 mg by mouth Daily.     • metFORMIN (GLUCOPHAGE) 1000 MG tablet  "Take 1,000 mg by mouth 2 (Two) Times a Day With Meals. IF BS IS LOW WILL ONLY TAKE 500 MG     • tamsulosin (FLOMAX) 0.4 MG capsule 24 hr capsule      • Triprolidine-Pseudoephedrine (ANTIHISTAMINE PO) Take  by mouth.       No current facility-administered medications on file prior to visit.      Allergies as of 2020   • (No Known Allergies)     Social History     Socioeconomic History   • Marital status:      Spouse name: Not on file   • Number of children: Not on file   • Years of education: Not on file   • Highest education level: Not on file   Tobacco Use   • Smoking status: Former Smoker     Packs/day: 1.00     Years: 20.00     Pack years: 20.00     Types: Cigarettes     Quit date:      Years since quittin.9   • Smokeless tobacco: Never Used   • Tobacco comment: Smoked 1 ppd for 25 years - quit 2011   Substance and Sexual Activity   • Alcohol use: Yes     Frequency: Never     Comment: SOCIALLY   • Drug use: No   • Sexual activity: Defer     Family History   Problem Relation Age of Onset   • Coronary artery disease Mother    • Aortic stenosis Mother    • Stroke Mother    • Diabetes Father    • Stroke Father    • Malig Hyperthermia Neg Hx        Review of Systems   Constitution: Positive for malaise/fatigue.   Skin: Positive for itching.   Genitourinary: Positive for decreased libido.   Neurological: Positive for excessive daytime sleepiness.   All other systems reviewed and are negative.     Objective:     Vitals:    20 0904   BP: 126/78   Pulse: 77   SpO2: 100%   Weight: 94.6 kg (208 lb 9.6 oz)   Height: 180.3 cm (70.98\")     Body mass index is 29.11 kg/m².    Physical Exam   Constitutional: He is oriented to person, place, and time. He appears well-developed and well-nourished.   HENT:   Head: Normocephalic and atraumatic.   Eyes: Conjunctivae are normal.   Neck: Neck supple.   Cardiovascular: Normal rate and regular rhythm. Exam reveals no gallop and no friction rub.   Murmur " heard.   Systolic murmur is present with a grade of 2/6 at the upper right sternal border and upper left sternal border.  Pulmonary/Chest: Effort normal and breath sounds normal.   Abdominal: Soft. There is no abdominal tenderness.   Musculoskeletal:         General: No edema.   Neurological: He is alert and oriented to person, place, and time.   Skin: Skin is warm.   Psychiatric: He has a normal mood and affect. His behavior is normal.     Lab Review:   Procedures    Cardiac Procedures:  1.  Echocardiogram on 8/12/2019: Ejection fraction was 67%.  There was mild LVH.  There was grade 1 diastolic dysfunction.  The right ventricle was normal.  The left atrium was mildly enlarged.  The bicuspid aortic valve was heavily calcified with moderate to severe aortic stenosis.  There was mild tricuspid regurgitation.  2.  CT scan of the chest without contrast on 9/9/2019: There was a 5 cm ascending aortic aneurysm.  3.  Left heart catheterization on 9/24/2019: Angiographically normal coronary arteries.  4.  Status post ascending aortic aneurysm repair with a 28 mm Dacron interposition graft, proximal arch repair with a 28 mm graft with anastomosis in a leonora-arch configuration, tissue aortic valve replacement with a 25 mm Magna pericardial valve, and septal myectomy on 10/29/2019 by Dr. Shell.   5.  Echocardiogram on 12/27/2019: Ejection fraction was 60 to 65%.  There was mild to moderate LVH.  The left atrium was borderline dilated.  The bioprosthetic aortic valve was normal with normal gradients.  There was mild tricuspid regurgitation.    Assessment:       Diagnosis Plan   1. Congenital bicuspid aortic valve  CT Angiogram Chest With & Without Contrast   2. Status post ascending aortic aneurysm repair  CT Angiogram Chest With & Without Contrast   3. Status post aortic valve replacement with tissue valve     4. Status post ventricular septal myectomy       Plan:       Again, since stopping the Lipitor, his myalgias and  arthralgias are much better.  His arteries were clean on his heart catheterization prior to surgery.  I will review his recent labs from Dr. Danielle's office, although statin therapy may not be the best choice for him.    His last echocardiogram looked good in December 2019.  He will continue on the aspirin for his history of tissue aortic valve replacement.  He does need prophylactic antibiotics prior to dental appointments and dental cleanings, and I again reminded him of this.  His blood pressure is controlled on the amlodipine at 5 mg/day and Coreg 3.125 mg twice a day.  I did order a CT angiogram of his chest to reevaluate the ascending aortic aneurysm repair and to ensure that no other aneurysm has developed given his congenital bicuspid aortic valve.  I will see him back in the office in 6 months unless other issues arise.

## 2020-12-19 ENCOUNTER — HOSPITAL ENCOUNTER (OUTPATIENT)
Dept: CT IMAGING | Facility: HOSPITAL | Age: 55
Discharge: HOME | End: 2020-12-19
Admitting: INTERNAL MEDICINE

## 2020-12-19 PROCEDURE — 71275 CT ANGIOGRAPHY CHEST: CPT

## 2020-12-19 PROCEDURE — 0 IOPAMIDOL PER 1 ML: Performed by: INTERNAL MEDICINE

## 2020-12-19 PROCEDURE — 82565 ASSAY OF CREATININE: CPT

## 2020-12-19 RX ADMIN — IOPAMIDOL 95 ML: 755 INJECTION, SOLUTION INTRAVENOUS at 09:46

## 2020-12-21 LAB — CREAT BLDA-MCNC: 1.2 MG/DL (ref 0.6–1.3)

## 2020-12-21 NOTE — PROGRESS NOTES
Patient made aware of stable aorta.  He requested that the results be forwarded to his CT surgeon.  He will keep his previously scheduled follow-up with Dr. Win in June.

## 2021-01-12 ENCOUNTER — OFFICE VISIT (OUTPATIENT)
Dept: CARDIAC SURGERY | Facility: CLINIC | Age: 56
End: 2021-01-12

## 2021-01-12 VITALS
DIASTOLIC BLOOD PRESSURE: 81 MMHG | OXYGEN SATURATION: 96 % | HEART RATE: 81 BPM | WEIGHT: 208.5 LBS | BODY MASS INDEX: 29.19 KG/M2 | RESPIRATION RATE: 20 BRPM | HEIGHT: 71 IN | SYSTOLIC BLOOD PRESSURE: 127 MMHG | TEMPERATURE: 98.4 F

## 2021-01-12 DIAGNOSIS — Z86.79 S/P ASCENDING AORTIC ANEURYSM REPAIR: ICD-10-CM

## 2021-01-12 DIAGNOSIS — I71.21 ASCENDING AORTIC ANEURYSM (HCC): ICD-10-CM

## 2021-01-12 DIAGNOSIS — Z98.890 S/P ASCENDING AORTIC ANEURYSM REPAIR: ICD-10-CM

## 2021-01-12 DIAGNOSIS — Z95.2 S/P AVR (AORTIC VALVE REPLACEMENT): ICD-10-CM

## 2021-01-12 DIAGNOSIS — Z98.890 S/P VENTRICULAR SEPTAL MYECTOMY: Primary | ICD-10-CM

## 2021-01-12 DIAGNOSIS — I10 HTN (HYPERTENSION), BENIGN: ICD-10-CM

## 2021-01-12 PROCEDURE — 99215 OFFICE O/P EST HI 40 MIN: CPT | Performed by: NURSE PRACTITIONER

## 2021-01-12 NOTE — PROGRESS NOTES
2021      Subjective:      Marjorie Danielle MD    Chief Complaint: Follow up s/p ascending aortic repair, prox aortic arch repair, tissue AVR, and septal myectomy    History of Present Illness:       Dear Marjorie Feng MD and Colleagues,    It was nice to see Brigido Caraballo in [facility] for follow-up. He is a 55 y.o. male with a history of ascending aortic aneurysm, aortic arch aneurysm, bicuspid aortic valve, severe aortic stenosis, and asymmetric septal hypertrophy who underwent ascending aortic aneurysm repair, proximal arch repair in a hemiarch configuration, tissue AVR (25 mm Magna), and septal myectomy by Dr. Shell on 10/29/2019.  Additional PMHx includes:  HTN, DM type 2, ZENON, HLD, and obesity.  He comes in today for routine follow-up for aneurysm surveillence.  The CTA of chest on 2020 was reviewed.  The aortic root measures 2.8 cm, ascending aorta measures 3.6 cm, and DTA measures 2.6 cm.  In review of chart, he had an echo on 2020 and EF 60-65%, moderate LVH, grade I diastolic dysfunction, and normal prosthetic aortic valve with maximum gradient of 24 mmHg and mean gradient 12 mmHg. He reports pain and muscle cramps in his legs, feet, and arches.  He has stopped his statin but tells me he does not see much change in his symptoms.  He reports some right side sciatica as well.  His last hemoglobin a1c was 5.6 per his report. Additionally, he reports some dizziness/lightheadedness when stooping over with his face mask on.  This has been going on since wearing face masks d/t COVID-19 pandemic.  He also reports an excessive amount of gas and has made diet changes without any changes seen.   He denies shortness of breath or chest/back pain.  No vascular deficiencies or hyperextensible or hypermobile joints are noted on exam.  The patient's family history is positive for aneurysms--maternal grandmother  from ruptured brain aneurysm, negative for dissections, negative for connective  tissue disease, and negative for coronary disease in first degree family members.  His mother had a bicuspid aortic valve and has undergone AVR and TAVR procedures.  He does not smoke--quit in 2/2011.  His BP today is 127/81.  His wife is with him today for his appt.    Patient Active Problem List   Diagnosis   • Ascending aortic aneurysm (CMS/HCC)   • Obesity (BMI 30-39.9)   • Aortic stenosis, severe   • ZENON (obstructive sleep apnea)   • HTN (hypertension), benign   • Bicuspid aortic valve   • Abnormal EKG   • Type 2 diabetes mellitus without complication, without long-term current use of insulin (CMS/HCC)   • Nonrheumatic aortic valve stenosis   • Thoracic aortic aneurysm without rupture (CMS/HCC)   • S/P AVR (aortic valve replacement)   • S/P ventricular septal myectomy   • S/P ascending aortic aneurysm repair   • Hyperlipidemia       Past Medical History:   Diagnosis Date   • Abnormal EKG     Baseline anterolateral T wave abnormalities    • Aneurysm (CMS/HCC)    • Aortic aneurysm (CMS/HCC)    • Aortic stenosis    • Arthritis     THUMBS   • Bicuspid aortic valve    • Diabetes (CMS/HCC) 2011   • Heart murmur    • Hyperlipidemia    • Hypertension    • ZENON (obstructive sleep apnea)     Intolerant to CPAP  TESTED 20 YRS AGO  NO PROBLEMS NOW USES  NOSE STRIP AT NIGHT       Past Surgical History:   Procedure Laterality Date   • AORTIC VALVE REPAIR/REPLACEMENT     • ASCENDING ARCH/HEMIARCH REPLACEMENT N/A 10/29/2019    Procedure: INTRAOPERATIVE RICHARD; STERNOTOMY THORACIC AORTIC ANEURYSM ASCENDING ARCH REPAIR WITH CIRCULATORY ARREST; AORTIC VALVE REPLACEMENT AND PRP.;  Surgeon: Felipe Shell MD;  Location: [facility] OR;  Service: Cardiothoracic   • CARDIAC CATHETERIZATION N/A 9/24/2019    Procedure: Left Heart Cath (pre-op aortic valve replacement and aortic aneurysm repair);  Surgeon: Crow Ely MD;  Location: [facility] CATH INVASIVE LOCATION;  Service: Cardiovascular       No Known Allergies      Current  Outpatient Medications:   •  amLODIPine (NORVASC) 5 MG tablet, Take 1 tablet by mouth Daily., Disp: 30 tablet, Rfl: 11  •  aspirin 81 MG EC tablet, Take 81 mg by mouth Daily., Disp: , Rfl:   •  carvedilol (COREG) 3.125 MG tablet, Take 1 tablet by mouth 2 (Two) Times a Day., Disp: 60 tablet, Rfl: 3  •  Cholecalciferol (VITAMIN D3) 5000 units capsule capsule, Take 5,000 Units by mouth Daily., Disp: , Rfl:   •  CINNAMON PO, Take 1 tablet by mouth As Needed., Disp: , Rfl:   •  Coenzyme Q10 10 MG capsule, Take 200 mg by mouth Daily., Disp: , Rfl:   •  cyclobenzaprine (FLEXERIL) 5 MG tablet, Take 1 tablet by mouth 3 (Three) Times a Day As Needed for Muscle Spasms., Disp: 90 tablet, Rfl: 0  •  Flaxseed, Linseed, (FLAX SEEDS PO), Take 1,000 mg by mouth Daily., Disp: , Rfl:   •  Garlic 500 MG tablet, Take 1 tablet by mouth Daily., Disp: , Rfl:   •  Ginger, Zingiber officinalis, (GINGER ROOT) 500 MG capsule, Take 550 mg by mouth Daily., Disp: , Rfl:   •  metFORMIN (GLUCOPHAGE) 1000 MG tablet, Take 1,000 mg by mouth 2 (Two) Times a Day With Meals. IF BS IS LOW WILL ONLY TAKE 500 MG, Disp: , Rfl:   •  tamsulosin (FLOMAX) 0.4 MG capsule 24 hr capsule, , Disp: , Rfl:     Social History     Socioeconomic History   • Marital status:      Spouse name: Not on file   • Number of children: Not on file   • Years of education: Not on file   • Highest education level: Not on file   Tobacco Use   • Smoking status: Former Smoker     Packs/day: 1.00     Years: 20.00     Pack years: 20.00     Types: Cigarettes     Quit date: 2011     Years since quitting: 10.0   • Smokeless tobacco: Never Used   • Tobacco comment: Smoked 1 ppd for 25 years - quit 2/2011   Substance and Sexual Activity   • Alcohol use: Yes     Frequency: Never     Comment: SOCIALLY   • Drug use: No   • Sexual activity: Defer       Family History   Problem Relation Age of Onset   • Coronary artery disease Mother    • Aortic stenosis Mother    • Stroke Mother    •  Diabetes Father    • Stroke Father    • Malig Hyperthermia Neg Hx            Review of Systems:  Review of Systems   Constitutional: Negative for fatigue and fever.   HENT: Negative for postnasal drip and rhinorrhea.         Denies loss of taste/smell   Eyes: Negative for visual disturbance.   Respiratory: Negative for cough and shortness of breath.    Cardiovascular: Negative for chest pain, palpitations and leg swelling.   Gastrointestinal: Negative for constipation, diarrhea, nausea and vomiting.        Excessive gas   Genitourinary: Negative for difficulty urinating.   Musculoskeletal: Positive for back pain.   Skin: Negative for rash and wound.        Skin around sternotomy continues to itch   Allergic/Immunologic: Negative for immunocompromised state.   Neurological: Positive for dizziness, light-headedness and numbness. Negative for syncope, weakness and headaches.   Hematological: Does not bruise/bleed easily.   Psychiatric/Behavioral: Negative for sleep disturbance. The patient is not nervous/anxious.        Physical Exam:    Vital Signs:  Weight: 94.6 kg (208 lb 8 oz)   Body mass index is 29.08 kg/m².  Temp: 98.4 °F (36.9 °C)   Heart Rate: 81   BP: 127/81     Physical Exam  Vitals signs and nursing note reviewed.   Constitutional:       General: He is awake.      Appearance: Normal appearance. He is well-developed, well-groomed and overweight.   HENT:      Head: Normocephalic and atraumatic.      Nose: Nose normal.      Mouth/Throat:      Lips: Pink.      Mouth: Mucous membranes are moist.      Pharynx: Uvula midline.   Eyes:      General: Lids are normal. No scleral icterus.     Extraocular Movements: Extraocular movements intact.      Conjunctiva/sclera: Conjunctivae normal.      Pupils: Pupils are equal, round, and reactive to light.   Neck:      Musculoskeletal: Neck supple.      Thyroid: No thyroid mass or thyromegaly.      Vascular: Normal carotid pulses. No carotid bruit, hepatojugular reflux or  JVD.      Trachea: Trachea normal.   Cardiovascular:      Rate and Rhythm: Normal rate and regular rhythm.      Pulses:           Carotid pulses are 2+ on the right side and 2+ on the left side.       Radial pulses are 2+ on the right side and 2+ on the left side.        Femoral pulses are 2+ on the right side and 2+ on the left side.       Popliteal pulses are 2+ on the right side and 2+ on the left side.        Dorsalis pedis pulses are 2+ on the right side and 2+ on the left side.        Posterior tibial pulses are 2+ on the right side and 2+ on the left side.      Heart sounds: Murmur present. Systolic murmur present with a grade of 2/6.   Pulmonary:      Effort: Pulmonary effort is normal.      Breath sounds: Normal breath sounds.   Abdominal:      General: Abdomen is protuberant. Bowel sounds are normal. There is no distension or abdominal bruit.      Palpations: Abdomen is soft.      Tenderness: There is no abdominal tenderness.   Musculoskeletal:      Right lower leg: No edema.      Left lower leg: No edema.      Comments: Gait steady and strong without use of assistive devices   Lymphadenopathy:      Head:      Right side of head: No submental, submandibular, tonsillar, preauricular, posterior auricular or occipital adenopathy.      Left side of head: No submental, submandibular, tonsillar, preauricular, posterior auricular or occipital adenopathy.      Cervical: No cervical adenopathy.      Upper Body:      Right upper body: No supraclavicular adenopathy.      Left upper body: No supraclavicular adenopathy.   Skin:     General: Skin is warm and dry.      Capillary Refill: Capillary refill takes less than 2 seconds.      Findings: No erythema or rash.      Nails: There is no clubbing.        Comments: Well healed sternotomy   Neurological:      Mental Status: He is alert and oriented to person, place, and time.      GCS: GCS eye subscore is 4. GCS verbal subscore is 5. GCS motor subscore is 6.    Psychiatric:         Attention and Perception: Attention and perception normal.         Mood and Affect: Mood and affect normal.         Speech: Speech normal.         Behavior: Behavior normal. Behavior is cooperative.         Thought Content: Thought content normal.         Cognition and Memory: Cognition and memory normal.         Judgment: Judgment normal.          Assessment:     1.  Ascending aortic aneurysm, aortic arch aneurysm--s/p repair by Dr. Shell 10/29/2019  2.  Bicuspid aortopathy, severe aortic stenosis, bicuspid aortic valve--s/p tissue AVR by Dr. Shell 10/29/2019  3.  HTN--stable    Recommendation/Plan:       Continued risk factor modification of hypertension with a goal blood pressure less than 130/80, hyperlipidemia optimization, and continued avoidance of tobacco/nicotine products.    We discussed the importance of avoidance of over the counter decongestants and stimulants, specifically pseudoephedrine, for hypertension and aneurysm management.    Initiation of low aerobic exercise is indicated to help reduce body habitus, assist with blood pressure and cholesterol control.       Although risk of rupture is low, emergency department presentation is warranted for acute chest, back, or abdominal pain, syncope, or stroke like symptoms.    Follow up in [facility] in one year(s) with CT scan of chest without contrast.  He will continue to have longitudinal surveillance and follow with Dr. Win.  I encouraged him to talk with Dr. Danielle about his increased gas and he could try Gas-X in the short-term.  If his lightheadedness/dizziness continues he should also reach out to Dr. Danielle.     A total of 51 minutes was spent with pt/wife evaluating the data, examining the patient, discussing and counseling.  His CT images were shown to him and discussed.    Thank you for allowing us to participate in his care.    Regards,    Chayito Mistry, KAIT      **All problems and history are new to  this examiner.  Extensive review of records prior to and during patient visit

## 2021-03-26 ENCOUNTER — BULK ORDERING (OUTPATIENT)
Dept: CASE MANAGEMENT | Facility: OTHER | Age: 56
End: 2021-03-26

## 2021-03-26 DIAGNOSIS — Z23 IMMUNIZATION DUE: ICD-10-CM

## 2021-03-31 RX ORDER — CARVEDILOL 3.12 MG/1
TABLET ORAL
Qty: 60 TABLET | Refills: 5 | Status: SHIPPED | OUTPATIENT
Start: 2021-03-31 | End: 2021-09-20

## 2021-06-02 ENCOUNTER — OFFICE VISIT (OUTPATIENT)
Dept: CARDIOLOGY | Facility: CLINIC | Age: 56
End: 2021-06-02

## 2021-06-02 VITALS
HEART RATE: 84 BPM | BODY MASS INDEX: 28.7 KG/M2 | WEIGHT: 205 LBS | HEIGHT: 71 IN | OXYGEN SATURATION: 99 % | SYSTOLIC BLOOD PRESSURE: 118 MMHG | DIASTOLIC BLOOD PRESSURE: 72 MMHG

## 2021-06-02 DIAGNOSIS — Z86.79 STATUS POST ASCENDING AORTIC ANEURYSM REPAIR: ICD-10-CM

## 2021-06-02 DIAGNOSIS — Z95.3 STATUS POST AORTIC VALVE REPLACEMENT WITH TISSUE VALVE: ICD-10-CM

## 2021-06-02 DIAGNOSIS — Z98.890 STATUS POST VENTRICULAR SEPTAL MYECTOMY: ICD-10-CM

## 2021-06-02 DIAGNOSIS — Z98.890 STATUS POST ASCENDING AORTIC ANEURYSM REPAIR: ICD-10-CM

## 2021-06-02 DIAGNOSIS — I10 ESSENTIAL HYPERTENSION: ICD-10-CM

## 2021-06-02 DIAGNOSIS — Q23.1 CONGENITAL BICUSPID AORTIC VALVE: Primary | ICD-10-CM

## 2021-06-02 DIAGNOSIS — G47.33 OSA (OBSTRUCTIVE SLEEP APNEA): ICD-10-CM

## 2021-06-02 DIAGNOSIS — Z78.9 STATIN INTOLERANCE: ICD-10-CM

## 2021-06-02 DIAGNOSIS — E78.2 MIXED HYPERLIPIDEMIA: ICD-10-CM

## 2021-06-02 PROCEDURE — 99214 OFFICE O/P EST MOD 30 MIN: CPT | Performed by: NURSE PRACTITIONER

## 2021-06-02 RX ORDER — EVOLOCUMAB 140 MG/ML
150 INJECTION, SOLUTION SUBCUTANEOUS
Qty: 2.1 ML | Refills: 11 | Status: SHIPPED | OUTPATIENT
Start: 2021-06-02 | End: 2022-01-13

## 2021-06-02 NOTE — PROGRESS NOTES
"    CARDIOLOGY        Patient Name: Brigido Caraballo  :1965  Age: 55 y.o.  Primary Cardiologist: Lucio Win MD  Encounter Provider:  KAIT Arciniega    Date of Service: 21          CHIEF COMPLAINT / REASON FOR OFFICE VISIT     Follow-up (s/p congenital bicuspid aortic valve / 6 month f/u )      HISTORY OF PRESENT ILLNESS       HPI  Brigido Caraballo is a 55 y.o. male who presents today for semiannual evaluation.     Pt has a  history significant for asending aortic aneurysm with repair on 10/29/2019, aortic valve stenosis with bicuspid aortic valve with repair on 10/29/2019, hypertension, obstructive sleep apnea, diabetes    Patient states that he has done very well over the past 6 months.  He does bring with him a copy of his lipid panel results that are dated 2021.  This reveals total cholesterol 231, triglycerides 129, HDL 41, .  Patient has been on atorvastatin and pravastatin in the past and these medications were discontinued secondary to severe myalgias.  He has been following with CT surgery and his aneurysm repair was stable on last assessment.  Reports that he is currently active and exercising routinely without any exertional symptoms.  Denies any episodes of chest discomfort, shortness of breath, dyspnea with exertion, palpitations, lightheadedness, lower extremity edema, fatigue.      The following portions of the patient's history were reviewed and updated as appropriate: allergies, current medications, past family history, past medical history, past social history, past surgical history and problem list.      VITAL SIGNS     Visit Vitals  /72 (BP Location: Left arm, Patient Position: Sitting, Cuff Size: Adult)   Pulse 84   Ht 180.3 cm (71\")   Wt 93 kg (205 lb)   SpO2 99%   BMI 28.59 kg/m²         Wt Readings from Last 3 Encounters:   21 93 kg (205 lb)   21 94.6 kg (208 lb 8 oz)   20 95.7 kg (211 lb)     Body mass index is 28.59 kg/m².      REVIEW " OF SYSTEMS   Review of Systems   Constitutional: Negative for chills, fever, weight gain and weight loss.   Cardiovascular: Negative for leg swelling.   Respiratory: Negative for cough, snoring and wheezing.    Hematologic/Lymphatic: Negative for bleeding problem. Does not bruise/bleed easily.   Skin: Negative for color change.   Musculoskeletal: Negative for falls, joint pain and myalgias.   Gastrointestinal: Negative for melena.   Genitourinary: Negative for hematuria.   Neurological: Negative for excessive daytime sleepiness.   Psychiatric/Behavioral: Negative for depression. The patient is not nervous/anxious.            PHYSICAL EXAMINATION     Constitutional:       Appearance: Normal appearance. Well-developed.   Eyes:      Conjunctiva/sclera: Conjunctivae normal.   Neck:      Vascular: No carotid bruit.   Pulmonary:      Effort: Pulmonary effort is normal.      Breath sounds: Normal breath sounds.   Cardiovascular:      Normal rate. Regular rhythm. Normal S1. Normal S2.      Murmurs: There is no murmur.      No gallop. No click. No rub.   Edema:     Peripheral edema absent.   Musculoskeletal: Normal range of motion. Skin:     General: Skin is warm and dry.   Neurological:      Mental Status: Alert and oriented to person, place, and time.      GCS: GCS eye subscore is 4. GCS verbal subscore is 5. GCS motor subscore is 6.   Psychiatric:         Speech: Speech normal.         Behavior: Behavior normal.         Thought Content: Thought content normal.         Judgment: Judgment normal.           REVIEWED DATA     Procedures    Cardiac Procedures:  1. Lexiscan cardiac stress test 9/25/2015.  No evidence of ischemia.  2. Echocardiogram 9/25/2015.  EF 63%.  Normal diastolic function.  Bicuspid aortic valve.  Mild to moderate aortic stenosis with peak gradient 34 mmHg and mean radiant 20 mmHg  3. Echocardiogram 8/12/2019.  EF 67%.  Grade 1 diastolic dysfunction noted.  Bicuspid aortic valve with moderate to severe  aortic valve stenosis.  Maximum pressure gradient 57 mmHg, mean pressure gradient 34 mmHg.  Calculated RVSP 29 mmHg.  A sending aorta dilated measuring 4.1 cm.  4. Myocardial perfusion stress test 8/12/2019.  No evidence of ischemia.  EF 59%.  ST segment depression 1 mm noted during stress beginning at 3 minutes of stress.  Baseline ST depression.  Patient complains of pain.  5. Cardiac catheterization 9/24/2019.  Normal coronary arteries.  Elevated LV filling pressures 27 mmHg.  Peak to peak gradient across the aortic valve is 53 mmHg.  6. Asending aortic aneurysm repair and aortic valve replacement 10/29/2019.  7. Echocardiogram 12/27/2019.  LVEF 61-65%.  Mild to moderate LVH.  Normal RV cavity size and systolic function.  LAE.  25 mm bovine pericardial bioprosthetic valve present.  Aortic valve peak and mean gradients are within defined limits.  Mild tricuspid valve regurgitation.  Small to moderate posterior pericardial effusion without evidence of tamponade.  Calculated RVSP 24 mmHg.  8. Echocardiogram 11/30/2020.  LVEF 61-65%.  Moderate LVH.  Grade 1 diastolic dysfunction.  Normal RV cavity size and systolic function.  LAE.  Prosthetic aortic valve with normal gradients.  Maximum pressure gradient 24 mmHg, mean pressure gradient 12 mmHg.  Calculated RVSP 30 mmHg.  9. CTA chest 12/19/2020.  Aortic root measures 2.8 cm in diameter.  Ascending aorta measures 3.6 cm in diameter compared to 5 cm on prior study.        ASSESSMENT & PLAN      Diagnosis Plan   1. Congenital bicuspid aortic valve  Adult Transthoracic Echo Complete W/ Cont if Necessary Per Protocol   2. Status post aortic valve replacement with tissue valve  Adult Transthoracic Echo Complete W/ Cont if Necessary Per Protocol   3. Status post ventricular septal myectomy  Adult Transthoracic Echo Complete W/ Cont if Necessary Per Protocol   4. Status post ascending aortic aneurysm repair  Adult Transthoracic Echo Complete W/ Cont if Necessary Per Protocol    5. Essential hypertension     6. ZENON (obstructive sleep apnea)     7. Mixed hyperlipidemia     8. Statin intolerance           SUMMARY/DISCUSSION  1. Congenital bicuspid aortic valve with history of aortic valve replacement.  Patient currently asymptomatic and denies any chest discomfort, shortness of breath, lightheadedness.  Most recent echocardiogram dated 11/30 revealed gradients were normal.  Patient will be in need of repeat echocardiogram in 6 months and this can be done at his follow-up appointment.  2. History of ventricular septal myectomy.  Repeat echocardiogram in 6 months.  3. History of ascending aortic aneurysm repair.  Aortic root and ascending aorta measurements stable on CTA chest December 2020.  Patient follows the aortic clinic with CT surgery.  4. Hypertension.  Blood pressure controlled today at 118/72.  Continue amlodipine 5 mg/day, carvedilol 3.125 mg twice per day.  5. ZENON on CPAP  6. Hyperlipidemia with statin intolerance.  Patient is previously been on both atorvastatin and pravastatin and they were discontinued secondary to myalgias.  Lipid panel dated 4/23/2021 reveals total cholesterol 231 with .  With patient's history of aorta repair goal LDL less than 100.  Recommended patient inflations on PSK 9 inhibitor.  I am hopeful that his insurance will approve this medication as it is very important to have his LDL less than 100 given his history.  7. Follow-up routinely with Dr. Win in 6 months.  Echocardiogram prior to appointment.        MEDICATIONS         Discharge Medications          Accurate as of June 2, 2021  3:03 PM. If you have any questions, ask your nurse or doctor.            New Medications      Instructions Start Date   Repatha solution prefilled syringe injection  Generic drug: Evolocumab  Started by: KAIT Arciniega   140 mg, Subcutaneous, Every 14 Days         Continue These Medications      Instructions Start Date   amLODIPine 5 MG tablet  Commonly  known as: NORVASC   5 mg, Oral, Daily      aspirin 81 MG EC tablet   81 mg, Oral, Daily      carvedilol 3.125 MG tablet  Commonly known as: COREG   TAKE ONE (1) TABLET BY MOUTH TWO (2) (TWO) TIMES A DAY.       CINNAMON PO   1 tablet, Oral, As Needed      Coenzyme Q10 10 MG capsule   200 mg, Oral, Daily      cyclobenzaprine 5 MG tablet  Commonly known as: FLEXERIL   5 mg, Oral, 3 Times Daily PRN      FLAX SEEDS PO   1,000 mg, Oral, Daily      Garlic 500 MG tablet   1 tablet, Oral, Daily      Ginger Root 500 MG capsule   550 mg, Oral, Daily      metFORMIN 1000 MG tablet  Commonly known as: GLUCOPHAGE   1,000 mg, Oral, 2 Times Daily With Meals, IF BS IS LOW WILL ONLY TAKE 500 MG      tamsulosin 0.4 MG capsule 24 hr capsule  Commonly known as: FLOMAX   No dose, route, or frequency recorded.      vitamin D3 125 MCG (5000 UT) capsule capsule   5,000 Units, Oral, Daily                 **Dragon Disclaimer:   Much of this encounter note is an electronic transcription/translation of spoken language to printed text. The electronic translation of spoken language may permit erroneous, or at times, nonsensical words or phrases to be inadvertently transcribed. Although I have reviewed the note for such errors, some may still exist.

## 2021-06-21 ENCOUNTER — TELEPHONE (OUTPATIENT)
Dept: CARDIOLOGY | Facility: CLINIC | Age: 56
End: 2021-06-21

## 2021-08-06 NOTE — OUTREACH NOTE
"CT Surgery Week 1 Survey      Responses   Facility patient discharged from?  [facility]   Does the patient have one of the following disease processes/diagnoses(primary or secondary)?  Cardiothoracic surgery   Is there a successful TCM telephone encounter documented?  No   Week 1 attempt successful?  Yes   Call start time  0927   Call end time  0950   Discharge diagnosis  Ascending and arch aortic aneurysm, Bicuspid aortopathy, Bicuspid aortic valve with severe stenosis   Meds reviewed with patient/caregiver?  Yes   Is the patient having any side effects they believe may be caused by any medication additions or changes?  No   Does the patient have all medications related to this admission filled (includes all antibiotics, pain medications, cardiac medications, etc.)  Yes   Is the patient taking all medications as directed (includes completed medication regime)?  Yes   Medication comments  finished with lasix and K+.    Does the patient have a primary care provider?   Yes   Does the patient have an appointment scheduled with their C/T surgeon?  Yes   Comments regarding PCP  Appointment with PC 11/29/19   Has the patient kept scheduled appointments due by today?  N/A   Comments  Dr. Win's, cardiology, office 11/14/19   What is the Home health agency?   [facility]    Has home health visited the patient within 72 hours of discharge?  Yes   Psychosocial issues?  No   Did the patient receive a copy of their discharge instructions?  Yes   Nursing interventions  Reviewed instructions with patient   What is the patient's perception of their health status since discharge?  Improving [Pt reports, \"pain is pretty common. I'm getting away from the back pain.\"]   Nursing interventions  Nurse provided patient education   Is the patient/caregiver able to teach back normal signs of recovery?  Nausea and lack of appetite, Constipation, Depression or irritability, Pain or discomfort at incisional site   Nursing interventions  " Reassured on normal signs of recovery   Is the patient /caregiver able to teach back basic post-op care?  Continue use of incentive spirometry at least 1 week post discharge, Practice 'cough and deep breath', Drive as instructed by MD in discharge instructions, Lifting as instructed by MD in discharge instructions, No tub bath, swimming, or hot tub until instructed by MD, Take showers only when approved by MD-sponge bathe until then, Keep incision areas clean, dry and protected   Is the patient/caregiver able to teach back signs and symptoms of incisional infection?  Pus or odor from incision, Fever, Increased drainage or bleeding, Increased redness, swelling or pain at the incisonal site, Incisional warmth   Is the patient/caregiver able to teach back steps to recovery at home?  Set small, achievable goals for return to baseline health, Rest and rebuild strength, gradually increase activity, Practice good oral hygiene, Eat a well-balance diet, Make a list of questions for surgeon's appointment   Is the patient /caregiver able to teach back the importance of cardiac rehab?  Yes   Nursing interventions  Provided education on importance of cardiac rehab   If the patient is a current smoker, are they able to teach back resources for cessation?  -- [Pt is not a smoker]   Is the patient/caregiver able to teach back the hierarchy of who to call/visit for symptoms/problems? PCP, Specialist, Home health nurse, Urgent Care, ED, 911  Yes   Week 1 call completed?  Yes   Wrap up additional comments  Pt has numbness in left fingers.            Georgie Sutton RN    94

## 2021-09-01 NOTE — TELEPHONE ENCOUNTER
[place] appeal for Repatha came back denied. Spoke with STEVIE per verbal who stated we will give pt samples of Livalo 2mg and if he has any myalgias with this he is to call. Called and spoke with pt and he verbalized understanding. The have been sat up front for him to  at his   convenience.     LOT [id number]  EXP 06/2023  HG

## 2021-09-20 RX ORDER — CARVEDILOL 3.12 MG/1
TABLET ORAL
Qty: 180 TABLET | Refills: 1 | Status: SHIPPED | OUTPATIENT
Start: 2021-09-20 | End: 2022-01-13

## 2021-10-21 ENCOUNTER — TELEPHONE (OUTPATIENT)
Dept: CARDIOLOGY | Facility: CLINIC | Age: 56
End: 2021-10-21

## 2021-10-21 NOTE — TELEPHONE ENCOUNTER
pts wife called back and I have passed on your message.  Thanks  Maria Luisa Dunn RN  Triage nurse

## 2021-10-21 NOTE — TELEPHONE ENCOUNTER
Pt called and LVM stating his BP is elevated today. He is also lightheaded and dizzy. He is wanting to know if he would be able to take an additional BP med, and if so which one. Please advise.

## 2021-11-17 RX ORDER — AMLODIPINE BESYLATE 5 MG/1
TABLET ORAL
Qty: 30 TABLET | Refills: 4 | Status: SHIPPED | OUTPATIENT
Start: 2021-11-17 | End: 2022-01-13 | Stop reason: SDUPTHER

## 2021-12-23 ENCOUNTER — TELEPHONE (OUTPATIENT)
Dept: CARDIOLOGY | Facility: CLINIC | Age: 56
End: 2021-12-23

## 2021-12-23 NOTE — TELEPHONE ENCOUNTER
Yes or any long-acting antihistamine like Zyrtec, Allegra, Claritin as long as there is no decongestant.

## 2021-12-23 NOTE — TELEPHONE ENCOUNTER
Pt wife called and LVM stating pt has a stuffy nose and they are wanting to know if it is okay for him to take benadryl due to his hx and current medications. Please advise. // HG

## 2022-01-03 ENCOUNTER — TELEPHONE (OUTPATIENT)
Dept: CARDIOLOGY | Facility: CLINIC | Age: 57
End: 2022-01-03

## 2022-01-13 ENCOUNTER — HOSPITAL ENCOUNTER (OUTPATIENT)
Dept: CARDIOLOGY | Facility: HOSPITAL | Age: 57
Discharge: HOME | End: 2022-01-13
Admitting: NURSE PRACTITIONER

## 2022-01-13 ENCOUNTER — OFFICE VISIT (OUTPATIENT)
Dept: CARDIOLOGY | Facility: CLINIC | Age: 57
End: 2022-01-13

## 2022-01-13 VITALS
HEIGHT: 70 IN | BODY MASS INDEX: 30.49 KG/M2 | SYSTOLIC BLOOD PRESSURE: 118 MMHG | RESPIRATION RATE: 16 BRPM | WEIGHT: 213 LBS | OXYGEN SATURATION: 98 % | DIASTOLIC BLOOD PRESSURE: 82 MMHG | HEART RATE: 74 BPM

## 2022-01-13 VITALS
SYSTOLIC BLOOD PRESSURE: 116 MMHG | HEART RATE: 75 BPM | BODY MASS INDEX: 29.35 KG/M2 | HEIGHT: 70 IN | WEIGHT: 205 LBS | DIASTOLIC BLOOD PRESSURE: 70 MMHG

## 2022-01-13 DIAGNOSIS — I10 ESSENTIAL HYPERTENSION: ICD-10-CM

## 2022-01-13 DIAGNOSIS — G47.33 OSA (OBSTRUCTIVE SLEEP APNEA): ICD-10-CM

## 2022-01-13 DIAGNOSIS — Z98.890 STATUS POST ASCENDING AORTIC ANEURYSM REPAIR: ICD-10-CM

## 2022-01-13 DIAGNOSIS — Z98.890 STATUS POST VENTRICULAR SEPTAL MYECTOMY: ICD-10-CM

## 2022-01-13 DIAGNOSIS — Q23.1 CONGENITAL BICUSPID AORTIC VALVE: ICD-10-CM

## 2022-01-13 DIAGNOSIS — Z95.3 STATUS POST AORTIC VALVE REPLACEMENT WITH TISSUE VALVE: ICD-10-CM

## 2022-01-13 DIAGNOSIS — Z86.79 STATUS POST ASCENDING AORTIC ANEURYSM REPAIR: ICD-10-CM

## 2022-01-13 DIAGNOSIS — E78.2 MIXED HYPERLIPIDEMIA: ICD-10-CM

## 2022-01-13 DIAGNOSIS — Q23.1 CONGENITAL BICUSPID AORTIC VALVE: Primary | ICD-10-CM

## 2022-01-13 DIAGNOSIS — Z78.9 STATIN INTOLERANCE: ICD-10-CM

## 2022-01-13 PROCEDURE — 93306 TTE W/DOPPLER COMPLETE: CPT | Performed by: INTERNAL MEDICINE

## 2022-01-13 PROCEDURE — 93306 TTE W/DOPPLER COMPLETE: CPT

## 2022-01-13 PROCEDURE — 99214 OFFICE O/P EST MOD 30 MIN: CPT | Performed by: NURSE PRACTITIONER

## 2022-01-13 PROCEDURE — 93000 ELECTROCARDIOGRAM COMPLETE: CPT | Performed by: NURSE PRACTITIONER

## 2022-01-13 RX ORDER — CARVEDILOL 3.12 MG/1
TABLET ORAL
Qty: 270 TABLET | Refills: 3 | Status: SHIPPED | OUTPATIENT
Start: 2022-01-13 | End: 2022-11-30

## 2022-01-13 RX ORDER — AMLODIPINE BESYLATE 5 MG/1
5 TABLET ORAL DAILY
Qty: 90 TABLET | Refills: 3 | Status: SHIPPED | OUTPATIENT
Start: 2022-01-13 | End: 2023-01-26

## 2022-01-13 NOTE — PROGRESS NOTES
"    CARDIOLOGY        Patient Name: Brigido Caraballo  :1965  Age: 56 y.o.  Primary Cardiologist: Lucio Win MD  Encounter Provider:  KAIT Arciniega    Date of Service: 21          CHIEF COMPLAINT / REASON FOR OFFICE VISIT     Heart Problem (congenital bicuspid aortic valve f/u)      HISTORY OF PRESENT ILLNESS       Heart Problem  Pertinent negatives include no chills, coughing, fever or myalgias.     Brigido Caraballo is a 56 y.o. male who presents today for semiannual evaluation.     Pt has a  history significant for asending aortic aneurysm with repair on 10/29/2019, aortic valve stenosis with bicuspid aortic valve with repair on 10/29/2019, hypertension, obstructive sleep apnea, diabetes.    Patient states that he has done well over the past 6 months.  He did have an echocardiogram prior to appointment but results are not yet available for review.  He never started Repatha as his insurance would not approve the prior authorization.  He also unfortunately did not receive the message to begin Livalo.  Reports some mild generalized fatigue but states that overall he is feeling well.  Admits that he needs to exercise more than he currently is.  He is asymptomatic and denies chest discomfort, shortness of breath, palpitations, lightheadedness, edema.      The following portions of the patient's history were reviewed and updated as appropriate: allergies, current medications, past family history, past medical history, past social history, past surgical history and problem list.      VITAL SIGNS     Visit Vitals  /82 (BP Location: Left arm, Patient Position: Sitting, Cuff Size: Adult)   Pulse 74   Resp 16   Ht 177.8 cm (70\")   Wt 96.6 kg (213 lb)   SpO2 98%   BMI 30.56 kg/m²         Wt Readings from Last 3 Encounters:   22 96.6 kg (213 lb)   22 93 kg (205 lb)   21 93 kg (205 lb)     Body mass index is 30.56 kg/m².      REVIEW OF SYSTEMS   Review of Systems   Constitutional: " Negative for chills, fever, weight gain and weight loss.   Cardiovascular: Negative for leg swelling.   Respiratory: Negative for cough, snoring and wheezing.    Hematologic/Lymphatic: Negative for bleeding problem. Does not bruise/bleed easily.   Skin: Negative for color change.   Musculoskeletal: Negative for falls, joint pain and myalgias.   Gastrointestinal: Negative for melena.   Genitourinary: Negative for hematuria.   Neurological: Negative for excessive daytime sleepiness.   Psychiatric/Behavioral: Negative for depression. The patient is not nervous/anxious.            PHYSICAL EXAMINATION     Constitutional:       Appearance: Normal appearance. Well-developed.   Eyes:      Conjunctiva/sclera: Conjunctivae normal.   Neck:      Vascular: No carotid bruit.   Pulmonary:      Effort: Pulmonary effort is normal.      Breath sounds: Normal breath sounds.   Cardiovascular:      Normal rate. Regular rhythm. Normal S1. Normal S2.      Murmurs: There is no murmur.      No gallop. No click. No rub.   Edema:     Peripheral edema absent.   Musculoskeletal: Normal range of motion. Skin:     General: Skin is warm and dry.   Neurological:      Mental Status: Alert and oriented to person, place, and time.      GCS: GCS eye subscore is 4. GCS verbal subscore is 5. GCS motor subscore is 6.   Psychiatric:         Speech: Speech normal.         Behavior: Behavior normal.         Thought Content: Thought content normal.         Judgment: Judgment normal.           REVIEWED DATA       ECG 12 Lead    Date/Time: 1/13/2022 9:21 AM  Performed by: Bertha Benítez APRN  Authorized by: Bertha Benítez APRN   Comparison: compared with previous ECG from 11/14/2019  Rhythm: sinus rhythm  Rate: normal  BPM: 72  Conduction: conduction normal  ST Segments: ST segments normal  T Waves: T waves normal  QRS axis: normal    Clinical impression: normal ECG            Cardiac Procedures:  1. Lexiscan cardiac stress test 9/25/2015.  No evidence  of ischemia.  2. Echocardiogram 9/25/2015.  EF 63%.  Normal diastolic function.  Bicuspid aortic valve.  Mild to moderate aortic stenosis with peak gradient 34 mmHg and mean radiant 20 mmHg  3. Echocardiogram 8/12/2019.  EF 67%.  Grade 1 diastolic dysfunction noted.  Bicuspid aortic valve with moderate to severe aortic valve stenosis.  Maximum pressure gradient 57 mmHg, mean pressure gradient 34 mmHg.  Calculated RVSP 29 mmHg.  A sending aorta dilated measuring 4.1 cm.  4. Myocardial perfusion stress test 8/12/2019.  No evidence of ischemia.  EF 59%.  ST segment depression 1 mm noted during stress beginning at 3 minutes of stress.  Baseline ST depression.  Patient complains of pain.  5. Cardiac catheterization 9/24/2019.  Normal coronary arteries.  Elevated LV filling pressures 27 mmHg.  Peak to peak gradient across the aortic valve is 53 mmHg.  6. Asending aortic aneurysm repair and aortic valve replacement 10/29/2019.  7. Echocardiogram 12/27/2019.  LVEF 61-65%.  Mild to moderate LVH.  Normal RV cavity size and systolic function.  LAE.  25 mm bovine pericardial bioprosthetic valve present.  Aortic valve peak and mean gradients are within defined limits.  Mild tricuspid valve regurgitation.  Small to moderate posterior pericardial effusion without evidence of tamponade.  Calculated RVSP 24 mmHg.  8. Echocardiogram 11/30/2020.  LVEF 61-65%.  Moderate LVH.  Grade 1 diastolic dysfunction.  Normal RV cavity size and systolic function.  LAE.  Prosthetic aortic valve with normal gradients.  Maximum pressure gradient 24 mmHg, mean pressure gradient 12 mmHg.  Calculated RVSP 30 mmHg.  9. CTA chest 12/19/2020.  Aortic root measures 2.8 cm in diameter.  Ascending aorta measures 3.6 cm in diameter compared to 5 cm on prior study.        ASSESSMENT & PLAN      Diagnosis Plan   1. Congenital bicuspid aortic valve  ECG 12 Lead   2. Status post aortic valve replacement with tissue valve     3. Status post ventricular septal  myectomy     4. Status post ascending aortic aneurysm repair     5. Essential hypertension     6. ZENON (obstructive sleep apnea)     7. Mixed hyperlipidemia     8. Statin intolerance           SUMMARY/DISCUSSION  1. Congenital bicuspid aortic valve with history of aortic valve replacement.  Patient is doing well.  No murmur on exam.  Patient had echocardiogram prior to appointment today to assess gradients.  Results not available for review.  2. History of ventricular septal myectomy.    3. History of ascending aortic aneurysm repair.  Aortic root and ascending aorta aneurysm repair.  CTA 12/2020.  Patient in need of surveillance CT.  This was ordered.   4. Hypertension.  Blood pressure currently very well controlled in clinic.  He will continue amlodipine 5 mg/day, carvedilol 3.125 mg in the morning and 6.25 mg in the evening.  5. ZENON on CPAP  6. Hyperlipidemia with statin intolerance.  Patient is previously been on both atorvastatin and pravastatin and they were discontinued secondary to myalgias.  We have attempted to improve PSK 9 inhibitor however insurance denied the appeal as patient had not tried 3 statin drugs.  We will give him samples of pitavastatin in clinic today.  He will call me to let him know how he tolerates this medication.  Patient had lipid panel with his PCP office approximately 3 months ago.  We will request a copy for our records.  7. Follow-up routinely with Dr. Win in 6 months.       ADDENDUM:  01/14/22  11:17 EST    Patient called the office stating that after using Livalo samples last night he woke up twice during the night with myalgias.  Encouraged him to discontinue this.  He has now had failure on Livalo, Lipitor, Praluent.  We will attempt to get approval for PSK 9 inhibitor.    MEDICATIONS         Discharge Medications          Accurate as of January 13, 2022  9:39 AM. If you have any questions, ask your nurse or doctor.            New Medications      Instructions Start Date    pitavastatin calcium 2 MG tablet tablet  Commonly known as: Livalo  Started by: KAIT Arciniega   2 mg, Oral, Nightly         Continue These Medications      Instructions Start Date   amLODIPine 5 MG tablet  Commonly known as: NORVASC   TAKE ONE (1) TABLET BY MOUTH DAILY.      aspirin 81 MG EC tablet   81 mg, Oral, Daily      carvedilol 3.125 MG tablet  Commonly known as: COREG   TAKE ONE (1) TABLET BY MOUTH TWO (2) (TWO) TIMES A DAY.       CINNAMON PO   1 tablet, Oral, As Needed      Coenzyme Q10 10 MG capsule   200 mg, Oral, Daily      cyclobenzaprine 5 MG tablet  Commonly known as: FLEXERIL   5 mg, Oral, 3 Times Daily PRN      FLAX SEEDS PO   1,000 mg, Oral, Daily      Garlic 500 MG tablet   1 tablet, Oral, Daily      Ginger Root 500 MG capsule   550 mg, Oral, Daily      metFORMIN 1000 MG tablet  Commonly known as: GLUCOPHAGE   1,000 mg, Oral, 2 Times Daily With Meals, IF BS IS LOW WILL ONLY TAKE 500 MG      tamsulosin 0.4 MG capsule 24 hr capsule  Commonly known as: FLOMAX   No dose, route, or frequency recorded.      vitamin D3 125 MCG (5000 UT) capsule capsule   5,000 Units, Oral, Daily         Stop These Medications    Repatha solution prefilled syringe injection  Generic drug: Evolocumab  Stopped by: KAIT Arciniega                **Dragon Disclaimer:   Much of this encounter note is an electronic transcription/translation of spoken language to printed text. The electronic translation of spoken language may permit erroneous, or at times, nonsensical words or phrases to be inadvertently transcribed. Although I have reviewed the note for such errors, some may still exist.

## 2022-01-14 ENCOUNTER — TELEPHONE (OUTPATIENT)
Dept: CARDIOLOGY | Facility: CLINIC | Age: 57
End: 2022-01-14

## 2022-01-14 LAB
AORTIC ARCH: 2.1 CM
AORTIC DIMENSIONLESS INDEX: 0.5 (DI)
ASCENDING AORTA: 3.2 CM
BH CV ECHO AV AORTIC VALVE AT ACCEL TIME CALCULATED: NORMAL MSEC
BH CV ECHO MEAS - ACS: 1.5 CM
BH CV ECHO MEAS - AO ACC TIME: 0.08 SEC
BH CV ECHO MEAS - AO MAX PG (FULL): 19.3 MMHG
BH CV ECHO MEAS - AO MAX PG: 24.4 MMHG
BH CV ECHO MEAS - AO MEAN PG (FULL): 9.5 MMHG
BH CV ECHO MEAS - AO MEAN PG: 12.6 MMHG
BH CV ECHO MEAS - AO ROOT AREA (BSA CORRECTED): 1.8
BH CV ECHO MEAS - AO ROOT AREA: 11.4 CM^2
BH CV ECHO MEAS - AO ROOT DIAM: 3.8 CM
BH CV ECHO MEAS - AO V2 MAX: 247 CM/SEC
BH CV ECHO MEAS - AO V2 MEAN: 170.5 CM/SEC
BH CV ECHO MEAS - AO V2 VTI: 45.9 CM
BH CV ECHO MEAS - ASC AORTA: 3.2 CM
BH CV ECHO MEAS - AT: 74 SEC
BH CV ECHO MEAS - AVA(I,A): 1.7 CM^2
BH CV ECHO MEAS - AVA(I,D): 1.7 CM^2
BH CV ECHO MEAS - AVA(V,A): 1.5 CM^2
BH CV ECHO MEAS - AVA(V,D): 1.5 CM^2
BH CV ECHO MEAS - BSA(HAYCOCK): 2.2 M^2
BH CV ECHO MEAS - BSA: 2.1 M^2
BH CV ECHO MEAS - BZI_BMI: 29.4 KILOGRAMS/M^2
BH CV ECHO MEAS - BZI_METRIC_HEIGHT: 177.8 CM
BH CV ECHO MEAS - BZI_METRIC_WEIGHT: 93 KG
BH CV ECHO MEAS - EDV(MOD-SP2): 82 ML
BH CV ECHO MEAS - EDV(MOD-SP4): 80 ML
BH CV ECHO MEAS - EDV(TEICH): 96.6 ML
BH CV ECHO MEAS - EF(CUBED): 76.1 %
BH CV ECHO MEAS - EF(MOD-BP): 61 %
BH CV ECHO MEAS - EF(MOD-SP2): 59.8 %
BH CV ECHO MEAS - EF(MOD-SP4): 62.5 %
BH CV ECHO MEAS - EF(TEICH): 68.2 %
BH CV ECHO MEAS - ESV(MOD-SP2): 33 ML
BH CV ECHO MEAS - ESV(MOD-SP4): 30 ML
BH CV ECHO MEAS - ESV(TEICH): 30.8 ML
BH CV ECHO MEAS - FS: 37.9 %
BH CV ECHO MEAS - IVS/LVPW: 0.95
BH CV ECHO MEAS - IVSD: 1.3 CM
BH CV ECHO MEAS - LAT PEAK E' VEL: 10.7 CM/SEC
BH CV ECHO MEAS - LV DIASTOLIC VOL/BSA (35-75): 37.9 ML/M^2
BH CV ECHO MEAS - LV MASS(C)D: 225.4 GRAMS
BH CV ECHO MEAS - LV MASS(C)DI: 106.9 GRAMS/M^2
BH CV ECHO MEAS - LV MAX PG: 5.1 MMHG
BH CV ECHO MEAS - LV MEAN PG: 3.1 MMHG
BH CV ECHO MEAS - LV SYSTOLIC VOL/BSA (12-30): 14.2 ML/M^2
BH CV ECHO MEAS - LV V1 MAX: 113.1 CM/SEC
BH CV ECHO MEAS - LV V1 MEAN: 86.2 CM/SEC
BH CV ECHO MEAS - LV V1 VTI: 23.7 CM
BH CV ECHO MEAS - LVIDD: 4.6 CM
BH CV ECHO MEAS - LVIDS: 2.8 CM
BH CV ECHO MEAS - LVLD AP2: 8.4 CM
BH CV ECHO MEAS - LVLD AP4: 7.8 CM
BH CV ECHO MEAS - LVLS AP2: 8.1 CM
BH CV ECHO MEAS - LVLS AP4: 6.9 CM
BH CV ECHO MEAS - LVOT AREA (M): 3.1 CM^2
BH CV ECHO MEAS - LVOT AREA: 3.3 CM^2
BH CV ECHO MEAS - LVOT DIAM: 2 CM
BH CV ECHO MEAS - LVPWD: 1.3 CM
BH CV ECHO MEAS - MED PEAK E' VEL: 9 CM/SEC
BH CV ECHO MEAS - MR MAX PG: 24.4 MMHG
BH CV ECHO MEAS - MR MAX VEL: 247 CM/SEC
BH CV ECHO MEAS - MV A DUR: 0.1 SEC
BH CV ECHO MEAS - MV A MAX VEL: 73.7 CM/SEC
BH CV ECHO MEAS - MV DEC SLOPE: 229.9 CM/SEC^2
BH CV ECHO MEAS - MV DEC TIME: 0.26 SEC
BH CV ECHO MEAS - MV E MAX VEL: 69 CM/SEC
BH CV ECHO MEAS - MV E/A: 0.94
BH CV ECHO MEAS - MV MAX PG: 3.1 MMHG
BH CV ECHO MEAS - MV MEAN PG: 1.3 MMHG
BH CV ECHO MEAS - MV P1/2T MAX VEL: 78.7 CM/SEC
BH CV ECHO MEAS - MV P1/2T: 100.3 MSEC
BH CV ECHO MEAS - MV V2 MAX: 88.2 CM/SEC
BH CV ECHO MEAS - MV V2 MEAN: 53.2 CM/SEC
BH CV ECHO MEAS - MV V2 VTI: 26.8 CM
BH CV ECHO MEAS - MVA P1/2T LCG: 2.8 CM^2
BH CV ECHO MEAS - MVA(P1/2T): 2.2 CM^2
BH CV ECHO MEAS - MVA(VTI): 2.9 CM^2
BH CV ECHO MEAS - PA ACC TIME: 0.11 SEC
BH CV ECHO MEAS - PA MAX PG (FULL): 4.8 MMHG
BH CV ECHO MEAS - PA MAX PG: 8.6 MMHG
BH CV ECHO MEAS - PA PR(ACCEL): 29.1 MMHG
BH CV ECHO MEAS - PA V2 MAX: 146.6 CM/SEC
BH CV ECHO MEAS - PI END-D VEL: 99.1 CM/SEC
BH CV ECHO MEAS - PULM A REVS DUR: 0.1 SEC
BH CV ECHO MEAS - PULM A REVS VEL: 26.2 CM/SEC
BH CV ECHO MEAS - PULM DIAS VEL: 41.1 CM/SEC
BH CV ECHO MEAS - PULM S/D: 1.3
BH CV ECHO MEAS - PULM SYS VEL: 53.8 CM/SEC
BH CV ECHO MEAS - PVA(V,A): 2 CM^2
BH CV ECHO MEAS - PVA(V,D): 2 CM^2
BH CV ECHO MEAS - QP/QS: 0.73
BH CV ECHO MEAS - RAP SYSTOLE: 3 MMHG
BH CV ECHO MEAS - RV MAX PG: 3.8 MMHG
BH CV ECHO MEAS - RV MEAN PG: 2.3 MMHG
BH CV ECHO MEAS - RV V1 MAX: 96.8 CM/SEC
BH CV ECHO MEAS - RV V1 MEAN: 73.4 CM/SEC
BH CV ECHO MEAS - RV V1 VTI: 18.8 CM
BH CV ECHO MEAS - RVOT AREA: 3 CM^2
BH CV ECHO MEAS - RVOT DIAM: 2 CM
BH CV ECHO MEAS - RVSP: 29.9 MMHG
BH CV ECHO MEAS - SI(AO): 248.3 ML/M^2
BH CV ECHO MEAS - SI(CUBED): 34.8 ML/M^2
BH CV ECHO MEAS - SI(LVOT): 36.7 ML/M^2
BH CV ECHO MEAS - SI(MOD-SP2): 23.2 ML/M^2
BH CV ECHO MEAS - SI(MOD-SP4): 23.7 ML/M^2
BH CV ECHO MEAS - SI(TEICH): 31.2 ML/M^2
BH CV ECHO MEAS - SUP REN AO DIAM: 2.2 CM
BH CV ECHO MEAS - SV(AO): 523.8 ML
BH CV ECHO MEAS - SV(CUBED): 73.3 ML
BH CV ECHO MEAS - SV(LVOT): 77.4 ML
BH CV ECHO MEAS - SV(MOD-SP2): 49 ML
BH CV ECHO MEAS - SV(MOD-SP4): 50 ML
BH CV ECHO MEAS - SV(RVOT): 56.8 ML
BH CV ECHO MEAS - SV(TEICH): 65.8 ML
BH CV ECHO MEAS - TAPSE (>1.6): 1.4 CM
BH CV ECHO MEAS - TR MAX VEL: 259.3 CM/SEC
BH CV ECHO MEASUREMENTS AVERAGE E/E' RATIO: 7.01
BH CV XLRA - RV BASE: 2.1 CM
BH CV XLRA - RV LENGTH: 6.6 CM
BH CV XLRA - RV MID: 1.9 CM
BH CV XLRA - TDI S': 10 CM/SEC
LEFT ATRIUM VOLUME INDEX: 21 ML/M2
MAXIMAL PREDICTED HEART RATE: 166 BPM
SINUS: 3.4 CM
STJ: 3 CM
STRESS TARGET HR: 141 BPM

## 2022-01-14 RX ORDER — EVOLOCUMAB 140 MG/ML
150 INJECTION, SOLUTION SUBCUTANEOUS
Qty: 2.1 ML | Refills: 11 | Status: SHIPPED | OUTPATIENT
Start: 2022-01-14 | End: 2023-03-01

## 2022-01-14 NOTE — TELEPHONE ENCOUNTER
Please tell him to stop the medication.  I will get this documented and we will try to get approval for PSK 9 inhibitor.

## 2022-01-14 NOTE — TELEPHONE ENCOUNTER
1/14/22   Spoke with pt. He will stop medication and will wait to see if get approved for Reaptha  Or Praluent.   He will call is any issues arise.   Luis ROSE

## 2022-01-14 NOTE — TELEPHONE ENCOUNTER
1/14/22   Pt called you gave him samples of Livalo.   He took medication last night. He states he woke up twice during night with horrible muscle cramping.   He states he can not take this medication.   Please advise   Pt's call back # [phone]  Thanks Luis ROSE

## 2022-01-20 NOTE — TELEPHONE ENCOUNTER
I received a fax that Repatha is denied.  Would you like to fill out an appeal?  (The appeal form is on Delaney's desk.)  An aRosa

## 2022-01-26 NOTE — TELEPHONE ENCOUNTER
rec'd voicemail from Nissa mayberry/ lauren. She was just wanting to make sure we rec'd the [facility]. Which we did, according to previous notes.    ranjana CONDON

## 2022-02-09 ENCOUNTER — HOSPITAL ENCOUNTER (OUTPATIENT)
Dept: CT IMAGING | Facility: HOSPITAL | Age: 57
Discharge: HOME | End: 2022-02-09
Admitting: NURSE PRACTITIONER

## 2022-02-09 LAB — CREAT BLDA-MCNC: 1.4 MG/DL (ref 0.6–1.3)

## 2022-02-09 PROCEDURE — 0 IOPAMIDOL PER 1 ML: Performed by: NURSE PRACTITIONER

## 2022-02-09 PROCEDURE — 71275 CT ANGIOGRAPHY CHEST: CPT

## 2022-02-09 PROCEDURE — 82565 ASSAY OF CREATININE: CPT

## 2022-02-09 RX ADMIN — IOPAMIDOL 95 ML: 755 INJECTION, SOLUTION INTRAVENOUS at 16:37

## 2022-04-19 ENCOUNTER — OFFICE VISIT (OUTPATIENT)
Dept: CARDIAC SURGERY | Facility: CLINIC | Age: 57
End: 2022-04-19

## 2022-04-19 VITALS
HEIGHT: 71 IN | HEART RATE: 71 BPM | BODY MASS INDEX: 28 KG/M2 | OXYGEN SATURATION: 97 % | WEIGHT: 200 LBS | RESPIRATION RATE: 18 BRPM | DIASTOLIC BLOOD PRESSURE: 80 MMHG | SYSTOLIC BLOOD PRESSURE: 124 MMHG | TEMPERATURE: 97.7 F

## 2022-04-19 DIAGNOSIS — Z98.890 S/P VENTRICULAR SEPTAL MYECTOMY: ICD-10-CM

## 2022-04-19 DIAGNOSIS — I35.0 AORTIC STENOSIS, SEVERE: ICD-10-CM

## 2022-04-19 DIAGNOSIS — Z86.79 S/P ASCENDING AORTIC ANEURYSM REPAIR: ICD-10-CM

## 2022-04-19 DIAGNOSIS — I71.21 ASCENDING AORTIC ANEURYSM: Primary | ICD-10-CM

## 2022-04-19 DIAGNOSIS — Z98.890 S/P ASCENDING AORTIC ANEURYSM REPAIR: ICD-10-CM

## 2022-04-19 DIAGNOSIS — E78.2 MIXED HYPERLIPIDEMIA: ICD-10-CM

## 2022-04-19 DIAGNOSIS — I10 HTN (HYPERTENSION), BENIGN: ICD-10-CM

## 2022-04-19 DIAGNOSIS — Q23.1 BICUSPID AORTIC VALVE: ICD-10-CM

## 2022-04-19 DIAGNOSIS — Z95.2 S/P AVR (AORTIC VALVE REPLACEMENT): ICD-10-CM

## 2022-04-19 PROCEDURE — 99214 OFFICE O/P EST MOD 30 MIN: CPT | Performed by: NURSE PRACTITIONER

## 2022-04-19 NOTE — PROGRESS NOTES
2022      Subjective:      Marjorie Danielle MD    Chief Complaint: Follow-up ascending aortic aneurysm repair, proximal arch repair, tissue AVR, septal myectomy    History of Present Illness:       Dear Marjorie Feng MD and Colleagues,    It was nice to see Brigido Caraballo in [facility] for follow-up. He is a 56 y.o. male with a history of ascending aortic aneurysm, aortic arch aneurysm, bicuspid aortic valve, severe aortic stenosis, and asymmetric septal hypertrophy who underwent ascending aortic aneurysm repair, proximal arch repair in Chris arch configuration, tissue AVR (25 mm magna), and septal myectomy by Dr. Shell on 10/29/2019.  Additional PMHx  Includes:  HTN, type 2 diabetes, ZENON, HLD, and statin intolerance.  His aortic valve pathology was originally diagnosed in .  He comes in today for routine follow-up for aneurysm surveillence.  The CTA of chest on 2022 was reviewed.  The aortic root measures 2.7 to 2.8 cm, ascending aorta 3.5 cm, and DTA 2.6 cm.  These measurements are stable.  Last echocardiogram was 2022 which showed EF 61%, aortic valve with peak velocity 247 cm/S, maximum gradient 24.4 mmHg, mean gradient 12.6 mmHg, aortic valve area 1.7cm², trace to mild mitral regurgitation, trace tricuspid regurgitation, and calculated RVSP 29.9 mmHg.  He denies shortness of breath, chest/back pain, numbness/tingling/pain of extremities.  No vascular deficiencies or hyperextensible or hypermobile joints are noted on exam.  The patient's family history is positive for aneurysms--his maternal grandmother  from a ruptured cerebral aneurysm.  Family history negative for aortic dissections, negative for connective tissue disease, and negative for coronary disease in first degree family members.  His mother has a known bicuspid aortic valve and has undergone traditional AVR and subsequent TAVR procedures.  He does not smoke.  He quit in 2011.  His blood pressure today is  124/80.  His weight is down 8 pounds from his visit in January 2021.  He brought in labs for me to review today.  His lipid panel is what stood out.  He has tried 3 different statins without being able to tolerate them he reports profound myalgias and charley horses.  He tells me that cardiology has attempted to get approval for injectable lipid meds without clearance.  He has not tolerated fish oils in the past as well.  He and his wife are open to homeopathic remedies.  His wife is with him today      Patient Active Problem List   Diagnosis   • Ascending aortic aneurysm (HCC)   • Obesity (BMI 30-39.9)   • Aortic stenosis, severe   • ZENON (obstructive sleep apnea)   • HTN (hypertension), benign   • Bicuspid aortic valve   • Abnormal EKG   • Type 2 diabetes mellitus without complication, without long-term current use of insulin (HCC)   • Nonrheumatic aortic valve stenosis   • Thoracic aortic aneurysm without rupture (HCC)   • S/P AVR (aortic valve replacement)   • S/P ventricular septal myectomy   • S/P ascending aortic aneurysm repair   • Hyperlipidemia       Past Medical History:   Diagnosis Date   • Abnormal EKG     Baseline anterolateral T wave abnormalities    • Aneurysm (HCC)    • Aortic aneurysm (HCC)    • Aortic stenosis    • Arthritis     THUMBS   • Bicuspid aortic valve    • Diabetes (HCC) 2011   • Heart murmur    • Hyperlipidemia    • Hypertension    • ZENON (obstructive sleep apnea)     Intolerant to CPAP  TESTED 20 YRS AGO  NO PROBLEMS NOW USES  NOSE STRIP AT NIGHT       Past Surgical History:   Procedure Laterality Date   • AORTIC VALVE REPAIR/REPLACEMENT     • ASCENDING ARCH/HEMIARCH REPLACEMENT N/A 10/29/2019    Procedure: INTRAOPERATIVE RICHARD; STERNOTOMY THORACIC AORTIC ANEURYSM ASCENDING ARCH REPAIR WITH CIRCULATORY ARREST; AORTIC VALVE REPLACEMENT AND PRP.;  Surgeon: Felipe Shell MD;  Location: [facility];  Service: Cardiothoracic   • CARDIAC CATHETERIZATION N/A 9/24/2019    Procedure: Left  Heart Cath (pre-op aortic valve replacement and aortic aneurysm repair);  Surgeon: Crow Ely MD;  Location: [facility] CATH INVASIVE LOCATION;  Service: Cardiovascular       No Known Allergies      Current Outpatient Medications:   •  amLODIPine (NORVASC) 5 MG tablet, Take 1 tablet by mouth Daily., Disp: 90 tablet, Rfl: 3  •  aspirin 81 MG EC tablet, Take 81 mg by mouth Daily., Disp: , Rfl:   •  carvedilol (COREG) 3.125 MG tablet, Take 1 tablet by mouth Every Morning AND 2 tablets Every Evening., Disp: 270 tablet, Rfl: 3  •  Cholecalciferol (VITAMIN D3) 5000 units capsule capsule, Take 5,000 Units by mouth Daily., Disp: , Rfl:   •  CINNAMON PO, Take 1 tablet by mouth As Needed., Disp: , Rfl:   •  Coenzyme Q10 10 MG capsule, Take 200 mg by mouth Daily., Disp: , Rfl:   •  cyclobenzaprine (FLEXERIL) 5 MG tablet, Take 1 tablet by mouth 3 (Three) Times a Day As Needed for Muscle Spasms., Disp: 90 tablet, Rfl: 0  •  Flaxseed, Linseed, (FLAX SEEDS PO), Take 1,000 mg by mouth Daily., Disp: , Rfl:   •  Garlic 500 MG tablet, Take 1 tablet by mouth Daily., Disp: , Rfl:   •  Ginger, Zingiber officinalis, (GINGER ROOT) 500 MG capsule, Take 550 mg by mouth Daily., Disp: , Rfl:   •  metFORMIN (GLUCOPHAGE) 1000 MG tablet, Take 1,000 mg by mouth 2 (Two) Times a Day With Meals. IF BS IS LOW WILL ONLY TAKE 500 MG, Disp: , Rfl:   •  tamsulosin (FLOMAX) 0.4 MG capsule 24 hr capsule, , Disp: , Rfl:   •  Evolocumab (Repatha) solution prefilled syringe injection, Inject 1 mL under the skin into the appropriate area as directed Every 14 (Fourteen) Days., Disp: 2.1 mL, Rfl: 11    Social History     Socioeconomic History   • Marital status:    Tobacco Use   • Smoking status: Former Smoker     Packs/day: 1.00     Years: 20.00     Pack years: 20.00     Types: Cigarettes     Quit date:      Years since quittin.3   • Smokeless tobacco: Never Used   • Tobacco comment: Smoked 1 ppd for 25 years - quit 2011   Vaping Use   •  Vaping Use: Never used   Substance and Sexual Activity   • Alcohol use: Yes     Comment: rarely / on holidays   • Drug use: No   • Sexual activity: Defer       Family History   Problem Relation Age of Onset   • Coronary artery disease Mother    • Aortic stenosis Mother    • Stroke Mother    • Diabetes Father    • Stroke Father    • Malig Hyperthermia Neg Hx            Review of Systems:  Review of Systems   Constitutional: Negative for fatigue.   Respiratory: Negative for shortness of breath.    Cardiovascular: Positive for chest pain. Negative for palpitations and leg swelling.   Gastrointestinal: Negative for abdominal pain.   Musculoskeletal: Positive for myalgias.   Skin: Negative for rash and wound.   Neurological: Positive for dizziness and light-headedness. Negative for weakness.   Hematological: Does not bruise/bleed easily.   Psychiatric/Behavioral: The patient is not nervous/anxious.        Physical Exam:    Vital Signs:  Weight: 90.7 kg (200 lb)   Body mass index is 27.89 kg/m².  Temp: 97.7 °F (36.5 °C)   Heart Rate: 71   BP: 124/80     Physical Exam  Vitals and nursing note reviewed.   Constitutional:       General: He is awake.      Appearance: Normal appearance. He is well-developed and well-groomed.      Comments: Wife present   HENT:      Head: Normocephalic and atraumatic.      Nose: Nose normal.      Mouth/Throat:      Lips: Pink.      Mouth: Mucous membranes are moist.      Pharynx: Uvula midline.   Eyes:      General: Lids are normal. No scleral icterus.     Extraocular Movements: Extraocular movements intact.      Conjunctiva/sclera: Conjunctivae normal.      Pupils: Pupils are equal, round, and reactive to light.   Neck:      Thyroid: No thyroid mass or thyromegaly.      Vascular: Normal carotid pulses. No carotid bruit, hepatojugular reflux or JVD.      Trachea: Trachea normal.   Cardiovascular:      Rate and Rhythm: Normal rate and regular rhythm.      Pulses:           Carotid pulses are 2+  on the right side and 2+ on the left side.       Radial pulses are 2+ on the right side and 2+ on the left side.        Femoral pulses are 2+ on the right side and 2+ on the left side.       Popliteal pulses are 2+ on the right side and 2+ on the left side.        Dorsalis pedis pulses are 2+ on the right side and 2+ on the left side.        Posterior tibial pulses are 2+ on the right side and 2+ on the left side.      Heart sounds: Normal heart sounds. No murmur heard.  Pulmonary:      Effort: Pulmonary effort is normal.      Breath sounds: Normal breath sounds.   Chest:   Breasts:      Right: No supraclavicular adenopathy.      Left: No supraclavicular adenopathy.       Abdominal:      General: Abdomen is protuberant. Bowel sounds are normal. There is no distension.      Palpations: Abdomen is soft.      Tenderness: There is no abdominal tenderness.   Musculoskeletal:      Cervical back: Neck supple.      Right lower leg: No edema.      Left lower leg: No edema.      Comments: Gait steady and strong without use of assistive devices   Lymphadenopathy:      Head:      Right side of head: No submental, submandibular, tonsillar, preauricular, posterior auricular or occipital adenopathy.      Left side of head: No submental, submandibular, tonsillar, preauricular, posterior auricular or occipital adenopathy.      Cervical: No cervical adenopathy.      Upper Body:      Right upper body: No supraclavicular adenopathy.      Left upper body: No supraclavicular adenopathy.   Skin:     General: Skin is warm and dry.      Capillary Refill: Capillary refill takes less than 2 seconds.      Findings: No erythema or rash.      Nails: There is no clubbing.             Comments: Sternotomy well healed.   Neurological:      Mental Status: He is alert and oriented to person, place, and time.      GCS: GCS eye subscore is 4. GCS verbal subscore is 5. GCS motor subscore is 6.   Psychiatric:         Attention and Perception: Attention  and perception normal.         Mood and Affect: Mood and affect normal.         Speech: Speech normal.         Behavior: Behavior normal. Behavior is cooperative.         Thought Content: Thought content normal.         Cognition and Memory: Cognition and memory normal.         Judgment: Judgment normal.          Assessment:     1.  Ascending aortic aneurysm, aortic arch aneurysm--s/p repair by Dr. Shell 10/2019--stable  2.  Bicuspid aortopathy, severe aortic stenosis, bicuspid aortic valve--status post tissue AVR by Dr. Shell 10/2019--stable  3.  HTN--stable  4.  HLD, statin intolerant--Per cardiology    Recommendation/Plan:       Continued risk factor modification of hypertension with a goal blood pressure less than 130/80, hyperlipidemia optimization, and continued avoidance of tobacco/nicotine products.    We discussed the importance of avoidance of over the counter decongestants and stimulants, specifically pseudoephedrine, for hypertension and aneurysm management.    Initiation of low aerobic exercise is indicated to help reduce body habitus, assist with blood pressure and cholesterol control.       Although risk of rupture is low, emergency department presentation is warranted for acute chest, back, or abdominal pain, syncope, or stroke like symptoms.    Follow up in [facility] in 1 year(s) with CT scan of chest without contrast.  His aortic measurements are stable without any signs of post repair dilatation.  Echo this year showed a normally functioning prosthetic aortic valve.  He is aware of his hyperlipidemia and is working towards finding a treatment.  He and his wife are open to homeopathic treatments as well.  He is going to research red yeast and discuss with cardiology.    I spent approximately 39 minutes total in evaluating the data, examining the patient, discussing the options and counseling.  Independent interpretation of imaging.  Imaging shown to pt/wife.    Thank you for allowing us to  participate in his care.    Regards,    Chayito Mistry, APRN      **All problems and history are new to this examiner.  Extensive review of records prior to and during patient visit

## 2022-08-02 ENCOUNTER — OFFICE VISIT (OUTPATIENT)
Dept: CARDIOLOGY | Facility: CLINIC | Age: 57
End: 2022-08-02

## 2022-08-02 VITALS
HEART RATE: 72 BPM | DIASTOLIC BLOOD PRESSURE: 68 MMHG | SYSTOLIC BLOOD PRESSURE: 112 MMHG | WEIGHT: 209.8 LBS | HEIGHT: 71 IN | BODY MASS INDEX: 29.37 KG/M2

## 2022-08-02 DIAGNOSIS — Z98.890 STATUS POST VENTRICULAR SEPTAL MYECTOMY: ICD-10-CM

## 2022-08-02 DIAGNOSIS — Z95.3 STATUS POST AORTIC VALVE REPLACEMENT WITH TISSUE VALVE: ICD-10-CM

## 2022-08-02 DIAGNOSIS — Z86.79 STATUS POST ASCENDING AORTIC ANEURYSM REPAIR: ICD-10-CM

## 2022-08-02 DIAGNOSIS — Z98.890 STATUS POST ASCENDING AORTIC ANEURYSM REPAIR: ICD-10-CM

## 2022-08-02 DIAGNOSIS — Q23.1 CONGENITAL BICUSPID AORTIC VALVE: ICD-10-CM

## 2022-08-02 DIAGNOSIS — R07.2 PRECORDIAL PAIN: Primary | ICD-10-CM

## 2022-08-02 PROCEDURE — 93000 ELECTROCARDIOGRAM COMPLETE: CPT | Performed by: INTERNAL MEDICINE

## 2022-08-02 PROCEDURE — 99214 OFFICE O/P EST MOD 30 MIN: CPT | Performed by: INTERNAL MEDICINE

## 2022-08-17 ENCOUNTER — HOSPITAL ENCOUNTER (OUTPATIENT)
Dept: CARDIOLOGY | Facility: HOSPITAL | Age: 57
Discharge: HOME | End: 2022-08-17
Admitting: INTERNAL MEDICINE

## 2022-08-17 VITALS — HEIGHT: 71 IN | BODY MASS INDEX: 29.38 KG/M2 | WEIGHT: 209.88 LBS

## 2022-08-17 DIAGNOSIS — R07.2 PRECORDIAL PAIN: ICD-10-CM

## 2022-08-17 LAB
BH CV NUCLEAR PRIOR STUDY: 1
BH CV REST NUCLEAR ISOTOPE DOSE: 10.7 MCI
BH CV STRESS BP STAGE 1: NORMAL
BH CV STRESS BP STAGE 2: NORMAL
BH CV STRESS BP STAGE 3: NORMAL
BH CV STRESS DURATION MIN STAGE 1: 3
BH CV STRESS DURATION MIN STAGE 2: 3
BH CV STRESS DURATION MIN STAGE 3: 3
BH CV STRESS DURATION SEC STAGE 1: 0
BH CV STRESS DURATION SEC STAGE 2: 0
BH CV STRESS DURATION SEC STAGE 3: 0
BH CV STRESS GRADE STAGE 1: 10
BH CV STRESS GRADE STAGE 2: 12
BH CV STRESS GRADE STAGE 3: 14
BH CV STRESS HR STAGE 1: 104
BH CV STRESS HR STAGE 2: 120
BH CV STRESS HR STAGE 3: 143
BH CV STRESS METS STAGE 1: 5
BH CV STRESS METS STAGE 2: 7.5
BH CV STRESS METS STAGE 3: 10
BH CV STRESS NUCLEAR ISOTOPE DOSE: 35.3 MCI
BH CV STRESS PROTOCOL 1: NORMAL
BH CV STRESS RECOVERY BP: NORMAL MMHG
BH CV STRESS RECOVERY HR: 90 BPM
BH CV STRESS SPEED STAGE 1: 1.7
BH CV STRESS SPEED STAGE 2: 2.5
BH CV STRESS SPEED STAGE 3: 3.4
BH CV STRESS STAGE 1: 1
BH CV STRESS STAGE 2: 2
BH CV STRESS STAGE 3: 3
LV EF NUC BP: 68 %
MAXIMAL PREDICTED HEART RATE: 165 BPM
PERCENT MAX PREDICTED HR: 86.67 %
STRESS BASELINE BP: NORMAL MMHG
STRESS BASELINE HR: 81 BPM
STRESS PERCENT HR: 102 %
STRESS POST ESTIMATED WORKLOAD: 10 METS
STRESS POST EXERCISE DUR MIN: 9 MIN
STRESS POST EXERCISE DUR SEC: 0 SEC
STRESS POST PEAK BP: NORMAL MMHG
STRESS POST PEAK HR: 143 BPM
STRESS TARGET HR: 140 BPM

## 2022-08-17 PROCEDURE — A9502 TC99M TETROFOSMIN: HCPCS | Performed by: INTERNAL MEDICINE

## 2022-08-17 PROCEDURE — 93017 CV STRESS TEST TRACING ONLY: CPT

## 2022-08-17 PROCEDURE — 93016 CV STRESS TEST SUPVJ ONLY: CPT | Performed by: INTERNAL MEDICINE

## 2022-08-17 PROCEDURE — 78452 HT MUSCLE IMAGE SPECT MULT: CPT | Performed by: INTERNAL MEDICINE

## 2022-08-17 PROCEDURE — 0 TECHNETIUM TETROFOSMIN KIT: Performed by: INTERNAL MEDICINE

## 2022-08-17 PROCEDURE — 93018 CV STRESS TEST I&R ONLY: CPT | Performed by: INTERNAL MEDICINE

## 2022-08-17 PROCEDURE — 78452 HT MUSCLE IMAGE SPECT MULT: CPT

## 2022-08-17 RX ADMIN — TETROFOSMIN 1 DOSE: 1.38 INJECTION, POWDER, LYOPHILIZED, FOR SOLUTION INTRAVENOUS at 08:00

## 2022-08-17 RX ADMIN — TETROFOSMIN 1 DOSE: 1.38 INJECTION, POWDER, LYOPHILIZED, FOR SOLUTION INTRAVENOUS at 08:52

## 2022-08-18 ENCOUNTER — TELEPHONE (OUTPATIENT)
Dept: CARDIOLOGY | Facility: CLINIC | Age: 57
End: 2022-08-18

## 2022-08-18 NOTE — TELEPHONE ENCOUNTER
----- Message from Brayden Win MD sent at 8/18/2022  9:02 AM EDT -----  Can you please let him know that the nuclear images were normal with no ischemia?  He has had an abnormal EKG with stress in the past, and this is not surprising that it was abnormal on this test as well.  This is why we go more with the nuclear images.  Overall, I thought that the test looked good.  Thanks     GM

## 2022-08-18 NOTE — TELEPHONE ENCOUNTER
Patient called back.  I let him know the results of his stress test.  He will now be seeing Dr. Gifford.  I have scheduled him for a 6 month f/u with SHON Dowell RN  [facility] Triage  08/18/22 12:46 EDT

## 2022-08-18 NOTE — PROGRESS NOTES
Can you please let him know that the nuclear images were normal with no ischemia?  He has had an abnormal EKG with stress in the past, and this is not surprising that it was abnormal on this test as well.  This is why we go more with the nuclear images.  Overall, I thought that the test looked good.  Thanks     GM

## 2022-08-18 NOTE — TELEPHONE ENCOUNTER
Left voicemail for Brigido Caraballo requesting callback.    Triage: patient needs 6 month f/u.  Please ask patient if her remembers which provider Dr. Win told him he would be seeing going forward.    Thank you,  Meri Kiran RN  Triage Nurse [facility]

## 2022-10-18 ENCOUNTER — DOCUMENTATION (OUTPATIENT)
Dept: CARDIOLOGY | Facility: CLINIC | Age: 57
End: 2022-10-18

## 2022-10-19 NOTE — PROGRESS NOTES
Called for advice about medications for allergies: runny nose and scratchy throat. I recommended Zyrtec, benadryl, and mucinex.

## 2022-11-30 RX ORDER — CARVEDILOL 3.12 MG/1
TABLET ORAL
Qty: 270 TABLET | Refills: 3 | Status: SHIPPED | OUTPATIENT
Start: 2022-11-30

## 2023-01-26 RX ORDER — AMLODIPINE BESYLATE 5 MG/1
TABLET ORAL
Qty: 90 TABLET | Refills: 3 | Status: SHIPPED | OUTPATIENT
Start: 2023-01-26

## 2023-03-01 ENCOUNTER — OFFICE VISIT (OUTPATIENT)
Dept: CARDIOLOGY | Facility: CLINIC | Age: 58
End: 2023-03-01
Payer: COMMERCIAL

## 2023-03-01 VITALS
WEIGHT: 217 LBS | BODY MASS INDEX: 31.07 KG/M2 | OXYGEN SATURATION: 97 % | HEIGHT: 70 IN | DIASTOLIC BLOOD PRESSURE: 74 MMHG | HEART RATE: 82 BPM | SYSTOLIC BLOOD PRESSURE: 120 MMHG

## 2023-03-01 DIAGNOSIS — E78.01 FAMILIAL HYPERCHOLESTEROLEMIA: Primary | ICD-10-CM

## 2023-03-01 DIAGNOSIS — Z78.9 STATIN INTOLERANCE: ICD-10-CM

## 2023-03-01 DIAGNOSIS — Z86.79 STATUS POST ASCENDING AORTIC ANEURYSM REPAIR: ICD-10-CM

## 2023-03-01 DIAGNOSIS — E11.9 TYPE 2 DIABETES MELLITUS WITHOUT COMPLICATION, WITHOUT LONG-TERM CURRENT USE OF INSULIN: ICD-10-CM

## 2023-03-01 DIAGNOSIS — I10 ESSENTIAL HYPERTENSION: ICD-10-CM

## 2023-03-01 DIAGNOSIS — G47.33 OSA (OBSTRUCTIVE SLEEP APNEA): ICD-10-CM

## 2023-03-01 DIAGNOSIS — Z98.890 STATUS POST VENTRICULAR SEPTAL MYECTOMY: ICD-10-CM

## 2023-03-01 DIAGNOSIS — Z95.3 STATUS POST AORTIC VALVE REPLACEMENT WITH TISSUE VALVE: ICD-10-CM

## 2023-03-01 DIAGNOSIS — Q23.1 CONGENITAL BICUSPID AORTIC VALVE: ICD-10-CM

## 2023-03-01 DIAGNOSIS — Z98.890 STATUS POST ASCENDING AORTIC ANEURYSM REPAIR: ICD-10-CM

## 2023-03-01 PROCEDURE — 99214 OFFICE O/P EST MOD 30 MIN: CPT | Performed by: NURSE PRACTITIONER

## 2023-03-01 RX ORDER — TIRZEPATIDE 2.5 MG/.5ML
2.5 INJECTION, SOLUTION SUBCUTANEOUS WEEKLY
Qty: 2 ML | Refills: 0 | Status: SHIPPED | OUTPATIENT
Start: 2023-03-01 | End: 2023-03-03 | Stop reason: ALTCHOICE

## 2023-03-01 NOTE — PROGRESS NOTES
"    CARDIOLOGY        Patient Name: Brigido Caraballo  :1965  Age: 57 y.o.  Primary Cardiologist: Lucio Win MD  Encounter Provider:  KAIT Arciniega    Date of Service: 23      CHIEF COMPLAINT / REASON FOR OFFICE VISIT     Precordial pain (6 month f/u)      HISTORY OF PRESENT ILLNESS       Heart Problem  Pertinent negatives include no chills, coughing, fever or myalgias.     Brigido Caraballo is a 57 y.o. male who presents today for semiannual evaluation.     Pt has a  history significant for asending aortic aneurysm with repair on 10/29/2019, aortic valve stenosis with bicuspid aortic valve with repair on 10/29/2019, hypertension, obstructive sleep apnea, diabetes.    Patient reports that he has done well since last assessment.  Reports that he has been diagnosed with type 2 diabetes and states that he is having a difficult time with weight loss.  He also notes that he recently had laboratory studies done with his PCP and that his cholesterol is still not controlled.  We have attempted to get PSK 9 inhibitors covered in the past however his insurance is denied the PA.  Patient states that he is exercising routinely.  Denies chest discomfort, dyspnea at rest or with exertion, palpitations, lightheadedness, edema, fatigue.    The following portions of the patient's history were reviewed and updated as appropriate: allergies, current medications, past family history, past medical history, past social history, past surgical history and problem list.      VITAL SIGNS     Visit Vitals  /74 (BP Location: Left arm, Patient Position: Sitting, Cuff Size: Adult)   Pulse 82   Ht 177.8 cm (70\")   Wt 98.4 kg (217 lb)   SpO2 97%   BMI 31.14 kg/m²         Wt Readings from Last 3 Encounters:   23 98.4 kg (217 lb)   22 95.2 kg (209 lb 14.1 oz)   22 95.2 kg (209 lb 12.8 oz)     Body mass index is 31.14 kg/m².      REVIEW OF SYSTEMS   Review of Systems   Constitutional: Negative for chills, " fever, weight gain and weight loss.   Cardiovascular: Negative for leg swelling.   Respiratory: Negative for cough, snoring and wheezing.    Hematologic/Lymphatic: Negative for bleeding problem. Does not bruise/bleed easily.   Skin: Negative for color change.   Musculoskeletal: Negative for falls, joint pain and myalgias.   Gastrointestinal: Negative for melena.   Genitourinary: Negative for hematuria.   Neurological: Negative for excessive daytime sleepiness.   Psychiatric/Behavioral: Negative for depression. The patient is not nervous/anxious.            PHYSICAL EXAMINATION     Constitutional:       Appearance: Normal appearance. Well-developed.   Eyes:      Conjunctiva/sclera: Conjunctivae normal.   Neck:      Vascular: No carotid bruit.   Pulmonary:      Effort: Pulmonary effort is normal.      Breath sounds: Normal breath sounds.   Cardiovascular:      Normal rate. Regular rhythm. Normal S1. Normal S2.      Murmurs: There is no murmur.      No gallop. No click. No rub.   Edema:     Peripheral edema absent.   Musculoskeletal: Normal range of motion. Skin:     General: Skin is warm and dry.   Neurological:      Mental Status: Alert and oriented to person, place, and time.      GCS: GCS eye subscore is 4. GCS verbal subscore is 5. GCS motor subscore is 6.   Psychiatric:         Speech: Speech normal.         Behavior: Behavior normal.         Thought Content: Thought content normal.         Judgment: Judgment normal.           REVIEWED DATA     Procedures      Cardiac Procedures:  1. Lexiscan cardiac stress test 9/25/2015.  No evidence of ischemia.  2. Echocardiogram 9/25/2015.  EF 63%.  Normal diastolic function.  Bicuspid aortic valve.  Mild to moderate aortic stenosis with peak gradient 34 mmHg and mean radiant 20 mmHg  3. Echocardiogram 8/12/2019.  EF 67%.  Grade 1 diastolic dysfunction noted.  Bicuspid aortic valve with moderate to severe aortic valve stenosis.  Maximum pressure gradient 57 mmHg, mean  pressure gradient 34 mmHg.  Calculated RVSP 29 mmHg.  A sending aorta dilated measuring 4.1 cm.  4. Myocardial perfusion stress test 8/12/2019.  No evidence of ischemia.  EF 59%.  ST segment depression 1 mm noted during stress beginning at 3 minutes of stress.  Baseline ST depression.  Patient complains of pain.  5. Cardiac catheterization 9/24/2019.  Normal coronary arteries.  Elevated LV filling pressures 27 mmHg.  Peak to peak gradient across the aortic valve is 53 mmHg.  6. Asending aortic aneurysm repair and aortic valve replacement 10/29/2019.  7. Echocardiogram 12/27/2019.  LVEF 61-65%.  Mild to moderate LVH.  Normal RV cavity size and systolic function.  LAE.  25 mm bovine pericardial bioprosthetic valve present.  Aortic valve peak and mean gradients are within defined limits.  Mild tricuspid valve regurgitation.  Small to moderate posterior pericardial effusion without evidence of tamponade.  Calculated RVSP 24 mmHg.  8. Echocardiogram 11/30/2020.  LVEF 61-65%.  Moderate LVH.  Grade 1 diastolic dysfunction.  Normal RV cavity size and systolic function.  LAE.  Prosthetic aortic valve with normal gradients.  Maximum pressure gradient 24 mmHg, mean pressure gradient 12 mmHg.  Calculated RVSP 30 mmHg.  9. CTA chest 12/19/2020.  Aortic root measures 2.8 cm in diameter.  Ascending aorta measures 3.6 cm in diameter compared to 5 cm on prior study.     Lipid panel 12/21/2022  Total cholesterol: 226  HDL: 42  Triglycerides: 171  LDL: 153    Hemoglobin A1c 12/21/2022  Hgb A1c 5.9        ASSESSMENT & PLAN     Diagnoses and all orders for this visit:    1. Familial hypercholesterolemia (Primary)  · Patient with uncontrolled cholesterol levels.  · As documented above total cholesterol 226 with   · Patient his tried atorvastatin, pravastatin, pitavastatin  · I have attempted to get PSK 9 inhibitors approved in the past and these have been denied  · We will attempt to get Leqvio covered for patient.  Given his  strong family history of coronary disease with mom having MI before the age of 50, father with history of CVA and uncontrolled cholesterol and his current cholesterol levels, he is at risk of developing coronary artery disease or possible stroke.  Getting cholesterol controlled is of utmost importance in this patient.  · Stressed importance of dietary and lifestyle changes to improve cholesterol.  Patient reports that he has attempted to lose weight with exercise and healthy diet.  He states that he has struggled to lose weight since he was diagnosed with diabetes.    2. Statin intolerance  · Plan as above    3. Congenital bicuspid aortic valve  4. Status post aortic valve replacement with tissue valve  · Gradients stable on echocardiogram performed 1 year ago  · Patient asymptomatic    5. Status post ascending aortic aneurysm repair  · No evidence of aneurysm on CTA chest February 2022    6. Status post ventricular septal myectomy  · Septum stable on echocardiogram performed 1 year ago    7. Essential hypertension  · BP controlled at 120/74  · Amlodipine 5 mg/day, carvedilol 3.125 mg twice daily    8. ZENON (obstructive sleep apnea)    9.  Type 2 diabetes  · Hemoglobin A1c 5.9 despite compliance with metformin  · Patient would benefit from GLP-1 therapy for diabetes management as well as weight loss which could also significantly improve his hyperlipidemia.  We will attempt to get PA for Mounjaro, Ozempic would also be adequate.    Other orders  -     Tirzepatide (Mounjaro) 2.5 MG/0.5ML solution pen-injector; Inject 0.5 mL under the skin into the appropriate area as directed 1 (One) Time Per Week.  Dispense: 2 mL; Refill: 0        Future Appointments       Provider Department Center    9/1/2023 2:20 PM Adolfo Gifford MD [facility] CARDIOLOGY [place]              MEDICATIONS         Discharge Medications          Accurate as of March 1, 2023 11:59 PM. If you have any questions, ask your nurse or  doctor.            New Medications      Instructions Start Date   Mounjaro 2.5 MG/0.5ML solution pen-injector  Generic drug: Tirzepatide  Started by: KAIT Arciniega   2.5 mg, Subcutaneous, Weekly         Continue These Medications      Instructions Start Date   amLODIPine 5 MG tablet  Commonly known as: NORVASC   TAKE ONE (1) TABLET BY MOUTH DAILY.      aspirin 81 MG EC tablet   81 mg, Oral, Daily      carvedilol 3.125 MG tablet  Commonly known as: COREG   TAKE ONE (1) TABLET BY MOUTH EVERY MORNING AND TWO (2) TABLETS EVERY EVENING.      CINNAMON PO   1 tablet, Oral, As Needed      Coenzyme Q10 10 MG capsule   200 mg, Oral, Daily      cyclobenzaprine 5 MG tablet  Commonly known as: FLEXERIL   5 mg, Oral, 3 Times Daily PRN      FLAX SEEDS PO   1,000 mg, Oral, Daily      Garlic 500 MG tablet   1 tablet, Oral, Daily      Ginger Root 500 MG capsule   550 mg, Oral, Daily      metFORMIN 1000 MG tablet  Commonly known as: GLUCOPHAGE   1,000 mg, Oral, 2 Times Daily With Meals, IF BS IS LOW WILL ONLY TAKE 500 MG      tamsulosin 0.4 MG capsule 24 hr capsule  Commonly known as: FLOMAX   No dose, route, or frequency recorded.      vitamin D3 125 MCG (5000 UT) capsule capsule   5,000 Units, Oral, Daily         Stop These Medications    Repatha solution prefilled syringe injection  Generic drug: Evolocumab  Stopped by: KAIT Arciniega                **Dragon Disclaimer:   Much of this encounter note is an electronic transcription/translation of spoken language to printed text. The electronic translation of spoken language may permit erroneous, or at times, nonsensical words or phrases to be inadvertently transcribed. Although I have reviewed the note for such errors, some may still exist.

## 2023-03-02 ENCOUNTER — TELEPHONE (OUTPATIENT)
Dept: CARDIOLOGY | Facility: CLINIC | Age: 58
End: 2023-03-02
Payer: COMMERCIAL

## 2023-03-03 RX ORDER — SEMAGLUTIDE 1.34 MG/ML
0.25 INJECTION, SOLUTION SUBCUTANEOUS WEEKLY
Qty: 1.5 ML | Refills: 2 | Status: SHIPPED | OUTPATIENT
Start: 2023-03-03

## 2023-03-03 NOTE — TELEPHONE ENCOUNTER
PA has been denied. Insurance is requesting pt have a trial with Bydureon Pen, Byetta, Ozempic, or Victoza first. Please advise. // HG

## 2023-03-12 ENCOUNTER — TELEPHONE (OUTPATIENT)
Dept: CARDIOLOGY | Facility: CLINIC | Age: 58
End: 2023-03-12
Payer: COMMERCIAL

## 2023-03-13 NOTE — TELEPHONE ENCOUNTER
"  Patient's wife paged the answering service.  She gave him a dose of Ozempic, but did not hold it for 6 seconds according to the instructions and he did not \"feel the burn\".  She was wondering if she should give a second injection and I said no because we have no way of knowing if he received the dosage.  He will need to wait till the next injection time.  "

## 2023-03-29 DIAGNOSIS — E78.01 FAMILIAL HYPERCHOLESTEROLEMIA: Primary | ICD-10-CM

## 2023-03-29 DIAGNOSIS — Z78.9 STATIN INTOLERANCE: ICD-10-CM

## 2023-03-29 DIAGNOSIS — E11.9 TYPE 2 DIABETES MELLITUS WITHOUT COMPLICATION, WITHOUT LONG-TERM CURRENT USE OF INSULIN: ICD-10-CM

## 2023-04-03 DIAGNOSIS — Z98.890 S/P ASCENDING AORTIC ANEURYSM REPAIR: ICD-10-CM

## 2023-04-03 DIAGNOSIS — Z86.79 S/P ASCENDING AORTIC ANEURYSM REPAIR: ICD-10-CM

## 2023-04-03 DIAGNOSIS — I71.21 ANEURYSM OF ASCENDING AORTA WITHOUT RUPTURE: ICD-10-CM

## 2023-04-10 ENCOUNTER — OFFICE VISIT (OUTPATIENT)
Dept: CARDIOLOGY | Facility: CLINIC | Age: 58
End: 2023-04-10
Payer: COMMERCIAL

## 2023-04-10 VITALS
OXYGEN SATURATION: 98 % | HEART RATE: 76 BPM | BODY MASS INDEX: 28.98 KG/M2 | SYSTOLIC BLOOD PRESSURE: 124 MMHG | WEIGHT: 207 LBS | DIASTOLIC BLOOD PRESSURE: 70 MMHG | HEIGHT: 71 IN

## 2023-04-10 DIAGNOSIS — Z95.3 STATUS POST AORTIC VALVE REPLACEMENT WITH TISSUE VALVE: ICD-10-CM

## 2023-04-10 DIAGNOSIS — Z98.890 STATUS POST ASCENDING AORTIC ANEURYSM REPAIR: ICD-10-CM

## 2023-04-10 DIAGNOSIS — Z98.890 STATUS POST VENTRICULAR SEPTAL MYECTOMY: ICD-10-CM

## 2023-04-10 DIAGNOSIS — Z86.79 STATUS POST ASCENDING AORTIC ANEURYSM REPAIR: ICD-10-CM

## 2023-04-10 DIAGNOSIS — I10 ESSENTIAL HYPERTENSION: ICD-10-CM

## 2023-04-10 DIAGNOSIS — E11.9 TYPE 2 DIABETES MELLITUS WITHOUT COMPLICATION, WITHOUT LONG-TERM CURRENT USE OF INSULIN: ICD-10-CM

## 2023-04-10 DIAGNOSIS — E78.01 FAMILIAL HYPERCHOLESTEROLEMIA: Primary | ICD-10-CM

## 2023-04-10 DIAGNOSIS — Q23.1 CONGENITAL BICUSPID AORTIC VALVE: ICD-10-CM

## 2023-04-10 DIAGNOSIS — Z78.9 STATIN INTOLERANCE: ICD-10-CM

## 2023-04-10 DIAGNOSIS — G47.33 OSA (OBSTRUCTIVE SLEEP APNEA): ICD-10-CM

## 2023-04-10 PROCEDURE — 1160F RVW MEDS BY RX/DR IN RCRD: CPT | Performed by: NURSE PRACTITIONER

## 2023-04-10 PROCEDURE — 1159F MED LIST DOCD IN RCRD: CPT | Performed by: NURSE PRACTITIONER

## 2023-04-10 PROCEDURE — 3074F SYST BP LT 130 MM HG: CPT | Performed by: NURSE PRACTITIONER

## 2023-04-10 PROCEDURE — 99214 OFFICE O/P EST MOD 30 MIN: CPT | Performed by: NURSE PRACTITIONER

## 2023-04-10 PROCEDURE — 3078F DIAST BP <80 MM HG: CPT | Performed by: NURSE PRACTITIONER

## 2023-04-10 NOTE — PROGRESS NOTES
"    CARDIOLOGY        Patient Name: Brigido Caraballo  :1965  Age: 57 y.o.  Primary Cardiologist: Lucio Win MD  Encounter Provider:  KAIT Arciniega    Date of Service: 04/10/23      CHIEF COMPLAINT / REASON FOR OFFICE VISIT     Familial Hypercholesterolemia (1 month f/u)      HISTORY OF PRESENT ILLNESS       Heart Problem  Pertinent negatives include no chills, coughing, fever or myalgias.     Brigido Caraballo is a 57 y.o. male who presents today for 1 month reevaluation after starting Ozempic.    Pt has a  history significant for asending aortic aneurysm with repair on 10/29/2019, aortic valve stenosis with bicuspid aortic valve with repair on 10/29/2019, hypertension, obstructive sleep apnea, diabetes.    At last assessment patient was placed on Ozempic for his type 2 diabetes.  He presents today for follow-up.  Patient states that he has now been on Ozempic for type 2 diabetes for 1 month.  Reports that he has had minimal side effects.  He has successfully lost approximately 10 pounds while being on this medication.  His wife does have questions about how to use the delivery pen and brings this with her today.  He has not yet heard from the infusion center for first appointment for Leqvio.  Currently is asymptomatic and denies chest pain, dyspnea at rest or with exertion, palpitations, lightheadedness, edema, fatigue    The following portions of the patient's history were reviewed and updated as appropriate: allergies, current medications, past family history, past medical history, past social history, past surgical history and problem list.      VITAL SIGNS     Visit Vitals  /70 (BP Location: Right arm, Patient Position: Sitting, Cuff Size: Adult)   Pulse 76   Ht 180.3 cm (71\")   Wt 93.9 kg (207 lb)   SpO2 98%   BMI 28.87 kg/m²         Wt Readings from Last 3 Encounters:   04/10/23 93.9 kg (207 lb)   23 98.4 kg (217 lb)   22 95.2 kg (209 lb 14.1 oz)     Body mass index is 28.87 " kg/m².      REVIEW OF SYSTEMS   Review of Systems   Constitutional: Negative for chills, fever, weight gain and weight loss.   Cardiovascular: Negative for leg swelling.   Respiratory: Negative for cough, snoring and wheezing.    Hematologic/Lymphatic: Negative for bleeding problem. Does not bruise/bleed easily.   Skin: Negative for color change.   Musculoskeletal: Negative for falls, joint pain and myalgias.   Gastrointestinal: Negative for melena.   Genitourinary: Negative for hematuria.   Neurological: Negative for excessive daytime sleepiness.   Psychiatric/Behavioral: Negative for depression. The patient is not nervous/anxious.            PHYSICAL EXAMINATION     Constitutional:       Appearance: Normal appearance. Well-developed.   Eyes:      Conjunctiva/sclera: Conjunctivae normal.   Neck:      Vascular: No carotid bruit.   Pulmonary:      Effort: Pulmonary effort is normal.      Breath sounds: Normal breath sounds.   Cardiovascular:      Normal rate. Regular rhythm. Normal S1. Normal S2.      Murmurs: There is no murmur.      No gallop. No click. No rub.   Edema:     Peripheral edema absent.   Musculoskeletal: Normal range of motion. Skin:     General: Skin is warm and dry.   Neurological:      Mental Status: Alert and oriented to person, place, and time.      GCS: GCS eye subscore is 4. GCS verbal subscore is 5. GCS motor subscore is 6.   Psychiatric:         Speech: Speech normal.         Behavior: Behavior normal.         Thought Content: Thought content normal.         Judgment: Judgment normal.           REVIEWED DATA     Procedures    Cardiac Procedures:  1. Lexiscan cardiac stress test 9/25/2015.  No evidence of ischemia.  2. Echocardiogram 9/25/2015.  EF 63%.  Normal diastolic function.  Bicuspid aortic valve.  Mild to moderate aortic stenosis with peak gradient 34 mmHg and mean radiant 20 mmHg  3. Echocardiogram 8/12/2019.  EF 67%.  Grade 1 diastolic dysfunction noted.  Bicuspid aortic valve with  moderate to severe aortic valve stenosis.  Maximum pressure gradient 57 mmHg, mean pressure gradient 34 mmHg.  Calculated RVSP 29 mmHg.  A sending aorta dilated measuring 4.1 cm.  4. Myocardial perfusion stress test 8/12/2019.  No evidence of ischemia.  EF 59%.  ST segment depression 1 mm noted during stress beginning at 3 minutes of stress.  Baseline ST depression.  Patient complains of pain.  5. Cardiac catheterization 9/24/2019.  Normal coronary arteries.  Elevated LV filling pressures 27 mmHg.  Peak to peak gradient across the aortic valve is 53 mmHg.  6. Asending aortic aneurysm repair and aortic valve replacement 10/29/2019.  7. Echocardiogram 12/27/2019.  LVEF 61-65%.  Mild to moderate LVH.  Normal RV cavity size and systolic function.  LAE.  25 mm bovine pericardial bioprosthetic valve present.  Aortic valve peak and mean gradients are within defined limits.  Mild tricuspid valve regurgitation.  Small to moderate posterior pericardial effusion without evidence of tamponade.  Calculated RVSP 24 mmHg.  8. Echocardiogram 11/30/2020.  LVEF 61-65%.  Moderate LVH.  Grade 1 diastolic dysfunction.  Normal RV cavity size and systolic function.  LAE.  Prosthetic aortic valve with normal gradients.  Maximum pressure gradient 24 mmHg, mean pressure gradient 12 mmHg.  Calculated RVSP 30 mmHg.  9. CTA chest 12/19/2020.  Aortic root measures 2.8 cm in diameter.  Ascending aorta measures 3.6 cm in diameter compared to 5 cm on prior study.     Lipid panel 12/21/2022  Total cholesterol: 226  HDL: 42  Triglycerides: 171  LDL: 153    Hemoglobin A1c 12/21/2022  Hgb A1c 5.9        ASSESSMENT & PLAN     Diagnoses and all orders for this visit:    1. Familial hypercholesterolemia (Primary)  · Patient with uncontrolled cholesterol levels  · As documented above total cholesterol 226 with   · Patient has tried atorvastatin, pravastatin, pit of a statin in the past.  · PSK 9 inhibitors have been denied in the past  · Leqvio has  been ordered, awaiting insurance approval.  Patient has strong family history for coronary disease with mom having an MI before the age of 50, father with history of CVA and uncontrolled cholesterol.  He is at risk for developing cardiovascular disease if cholesterol is not better controlled  · Stressed importance of dietary and lifestyle changes, he is attempting to lose weight and has successfully lost approximately 10 pounds with the use of Ozempic.  · Await approval of Leqvio    2. Statin intolerance  · Plan as above    3. Type 2 diabetes mellitus without complication, without long-term current use of insulin  · Most recent hemoglobin A1c 5.9 despite compliance with metformin  · Tolerating Ozempic, will increase to 0.5 mg/week  · Successfully lost 10 pounds    4. Congenital bicuspid aortic valve  5. Status post aortic valve replacement with tissue valve  6. Status post ascending aortic aneurysm repair  7. Status post ventricular septal myectomy  · Stable on echocardiogram    8. Essential hypertension  · BP controlled at 120/74  · Continue amlodipine 5 mg/day, carvedilol 3.125 mg twice daily    9. ZENON (obstructive sleep apnea)        Return in about 4 weeks (around 5/8/2023) for KAIT Serrato.    Future Appointments       Provider Department Center    5/10/2023 2:20 PM Bertha Benítez APRN [facility] CARDIOLOGY [place]    6/6/2023 2:15 PM [facility] 2 [facility] [place]    9/1/2023 2:20 PM Adolfo Gifford MD [facility] CARDIOLOGY [place]            MEDICATIONS         Discharge Medications          Accurate as of April 10, 2023  2:58 PM. If you have any questions, ask your nurse or doctor.            Continue These Medications      Instructions Start Date   amLODIPine 5 MG tablet  Commonly known as: NORVASC   TAKE ONE (1) TABLET BY MOUTH DAILY.      aspirin 81 MG EC tablet   81 mg, Oral, Daily      carvedilol 3.125 MG tablet  Commonly known as: COREG   TAKE ONE  (1) TABLET BY MOUTH EVERY MORNING AND TWO (2) TABLETS EVERY EVENING.      CINNAMON PO   1 tablet, Oral, As Needed      Coenzyme Q10 10 MG capsule   200 mg, Oral, Daily      cyclobenzaprine 5 MG tablet  Commonly known as: FLEXERIL   5 mg, Oral, 3 Times Daily PRN      FLAX SEEDS PO   1,000 mg, Oral, Daily      Garlic 500 MG tablet   1 tablet, Oral, Daily      Ginger Root 500 MG capsule   550 mg, Oral, Daily      metFORMIN 1000 MG tablet  Commonly known as: GLUCOPHAGE   1,000 mg, Oral, 2 Times Daily With Meals, IF BS IS LOW WILL ONLY TAKE 500 MG      Ozempic (0.25 or 0.5 MG/DOSE) 2 MG/1.5ML solution pen-injector  Generic drug: Semaglutide(0.25 or 0.5MG/DOS)   0.25 mg, Subcutaneous, Weekly, 0.25mg x 4 doses, then increase to 0.5mg x 4 doses      tamsulosin 0.4 MG capsule 24 hr capsule  Commonly known as: FLOMAX   No dose, route, or frequency recorded.      vitamin D3 125 MCG (5000 UT) capsule capsule   5,000 Units, Oral, Daily                 **Dragon Disclaimer:   Much of this encounter note is an electronic transcription/translation of spoken language to printed text. The electronic translation of spoken language may permit erroneous, or at times, nonsensical words or phrases to be inadvertently transcribed. Although I have reviewed the note for such errors, some may still exist.

## 2023-05-10 ENCOUNTER — OFFICE VISIT (OUTPATIENT)
Dept: CARDIOLOGY | Facility: CLINIC | Age: 58
End: 2023-05-10
Payer: COMMERCIAL

## 2023-05-10 VITALS
HEIGHT: 71 IN | HEART RATE: 93 BPM | SYSTOLIC BLOOD PRESSURE: 114 MMHG | DIASTOLIC BLOOD PRESSURE: 78 MMHG | BODY MASS INDEX: 28.56 KG/M2 | WEIGHT: 204 LBS | OXYGEN SATURATION: 96 %

## 2023-05-10 DIAGNOSIS — Q23.1 CONGENITAL BICUSPID AORTIC VALVE: ICD-10-CM

## 2023-05-10 DIAGNOSIS — Z78.9 STATIN INTOLERANCE: ICD-10-CM

## 2023-05-10 DIAGNOSIS — E11.9 TYPE 2 DIABETES MELLITUS WITHOUT COMPLICATION, WITHOUT LONG-TERM CURRENT USE OF INSULIN: Primary | ICD-10-CM

## 2023-05-10 DIAGNOSIS — E78.01 FAMILIAL HYPERCHOLESTEROLEMIA: ICD-10-CM

## 2023-05-10 DIAGNOSIS — G47.33 OSA (OBSTRUCTIVE SLEEP APNEA): ICD-10-CM

## 2023-05-10 DIAGNOSIS — Z95.3 STATUS POST AORTIC VALVE REPLACEMENT WITH TISSUE VALVE: ICD-10-CM

## 2023-05-10 DIAGNOSIS — I10 ESSENTIAL HYPERTENSION: ICD-10-CM

## 2023-05-10 NOTE — PROGRESS NOTES
"    CARDIOLOGY        Patient Name: Brigido Caraballo  :1965  Age: 57 y.o.  Primary Cardiologist: Lucio Win MD  Encounter Provider:  KAIT Arciniega    Date of Service: 05/10/23      CHIEF COMPLAINT / REASON FOR OFFICE VISIT     Familial Hypercholesterolemia (4 wk f/u)      HISTORY OF PRESENT ILLNESS       Heart Problem  Pertinent negatives include no chills, coughing, fever or myalgias.     Brigido Caraballo is a 57 y.o. male who presents today for 1 month reevaluation after starting Ozempic.    Pt has a  history significant for asending aortic aneurysm with repair on 10/29/2019, aortic valve stenosis with bicuspid aortic valve with repair on 10/29/2019, hypertension, obstructive sleep apnea, diabetes.    Patient has now been on semaglutide for type 2 diabetes and weight loss for approximately 6 weeks.  He is now down between 15 and 20 pounds.  Reports minimal side effects.  Has not yet heard from infusion centers to start Leqvio.  Currently asymptomatic.    The following portions of the patient's history were reviewed and updated as appropriate: allergies, current medications, past family history, past medical history, past social history, past surgical history and problem list.      VITAL SIGNS     Visit Vitals  /78 (BP Location: Left arm, Patient Position: Sitting, Cuff Size: Adult)   Pulse 93   Ht 180.3 cm (71\")   Wt 92.5 kg (204 lb)   SpO2 96%   BMI 28.45 kg/m²         Wt Readings from Last 3 Encounters:   05/10/23 92.5 kg (204 lb)   04/10/23 93.9 kg (207 lb)   23 98.4 kg (217 lb)     Body mass index is 28.45 kg/m².      REVIEW OF SYSTEMS   Review of Systems   Constitutional: Negative for chills, fever, weight gain and weight loss.   Cardiovascular: Negative for leg swelling.   Respiratory: Negative for cough, snoring and wheezing.    Hematologic/Lymphatic: Negative for bleeding problem. Does not bruise/bleed easily.   Skin: Negative for color change.   Musculoskeletal: Negative for " falls, joint pain and myalgias.   Gastrointestinal: Negative for melena.   Genitourinary: Negative for hematuria.   Neurological: Negative for excessive daytime sleepiness.   Psychiatric/Behavioral: Negative for depression. The patient is not nervous/anxious.            PHYSICAL EXAMINATION     Constitutional:       Appearance: Normal appearance. Well-developed.   Eyes:      Conjunctiva/sclera: Conjunctivae normal.   Neck:      Vascular: No carotid bruit.   Pulmonary:      Effort: Pulmonary effort is normal.      Breath sounds: Normal breath sounds.   Cardiovascular:      Normal rate. Regular rhythm. Normal S1. Normal S2.      Murmurs: There is no murmur.      No gallop. No click. No rub.   Edema:     Peripheral edema absent.   Musculoskeletal: Normal range of motion. Skin:     General: Skin is warm and dry.   Neurological:      Mental Status: Alert and oriented to person, place, and time.      GCS: GCS eye subscore is 4. GCS verbal subscore is 5. GCS motor subscore is 6.   Psychiatric:         Speech: Speech normal.         Behavior: Behavior normal.         Thought Content: Thought content normal.         Judgment: Judgment normal.           REVIEWED DATA     Procedures    Cardiac Procedures:  1. Lexiscan cardiac stress test 9/25/2015.  No evidence of ischemia.  2. Echocardiogram 9/25/2015.  EF 63%.  Normal diastolic function.  Bicuspid aortic valve.  Mild to moderate aortic stenosis with peak gradient 34 mmHg and mean radiant 20 mmHg  3. Echocardiogram 8/12/2019.  EF 67%.  Grade 1 diastolic dysfunction noted.  Bicuspid aortic valve with moderate to severe aortic valve stenosis.  Maximum pressure gradient 57 mmHg, mean pressure gradient 34 mmHg.  Calculated RVSP 29 mmHg.  A sending aorta dilated measuring 4.1 cm.  4. Myocardial perfusion stress test 8/12/2019.  No evidence of ischemia.  EF 59%.  ST segment depression 1 mm noted during stress beginning at 3 minutes of stress.  Baseline ST depression.  Patient  complains of pain.  5. Cardiac catheterization 9/24/2019.  Normal coronary arteries.  Elevated LV filling pressures 27 mmHg.  Peak to peak gradient across the aortic valve is 53 mmHg.  6. Asending aortic aneurysm repair and aortic valve replacement 10/29/2019.  7. Echocardiogram 12/27/2019.  LVEF 61-65%.  Mild to moderate LVH.  Normal RV cavity size and systolic function.  LAE.  25 mm bovine pericardial bioprosthetic valve present.  Aortic valve peak and mean gradients are within defined limits.  Mild tricuspid valve regurgitation.  Small to moderate posterior pericardial effusion without evidence of tamponade.  Calculated RVSP 24 mmHg.  8. Echocardiogram 11/30/2020.  LVEF 61-65%.  Moderate LVH.  Grade 1 diastolic dysfunction.  Normal RV cavity size and systolic function.  LAE.  Prosthetic aortic valve with normal gradients.  Maximum pressure gradient 24 mmHg, mean pressure gradient 12 mmHg.  Calculated RVSP 30 mmHg.  9. CTA chest 12/19/2020.  Aortic root measures 2.8 cm in diameter.  Ascending aorta measures 3.6 cm in diameter compared to 5 cm on prior study.     Lipid panel 12/21/2022  Total cholesterol: 226  HDL: 42  Triglycerides: 171  LDL: 153    Hemoglobin A1c 12/21/2022  Hgb A1c 5.9        ASSESSMENT & PLAN     Diagnoses and all orders for this visit:    1. Type 2 diabetes mellitus without complication, without long-term current use of insulin (Primary)  · Now on semaglutide 0.5 mg/week.  Will increase to 1 mg/week in 2 weeks.    · Continue metformin  · Now down between 15 and 20 pounds over the 6-week.    2. Congenital bicuspid aortic valve  3. Status post aortic valve replacement with tissue valve3  · Gradients stable on most recent echocardiogram    4. Essential hypertension  · Blood pressure controlled at 114/78  · Continue carvedilol, amlodipine    5. Familial hypercholesterolemia  6. Statin intolerance  · Order placed for Leqvio 3/29/2023.  Patient has not yet heard from infusion center  · Reaching out  to infusion center to determine delay    7. ZENON (obstructive sleep apnea)    Other orders  -     Semaglutide, 1 MG/DOSE, (OZEMPIC) 4 MG/3ML solution pen-injector; Inject 1 mg under the skin into the appropriate area as directed 1 (One) Time Per Week.  Dispense: 3 mL; Refill: 0          Future Appointments       Provider Department Center    6/6/2023 2:15 PM [facility] [facility] 2 [facility] CT [place]    8/14/2023 2:20 PM Bertha Benítez APRN [facility] CARDIOLOGY [place]    9/1/2023 2:20 PM Adolfo Gifford MD [facility] CARDIOLOGY [place]                MEDICATIONS         Discharge Medications          Accurate as of May 10, 2023  3:32 PM. If you have any questions, ask your nurse or doctor.            Changes to Medications      Instructions Start Date   Ozempic (0.25 or 0.5 MG/DOSE) 2 MG/1.5ML solution pen-injector  Generic drug: Semaglutide(0.25 or 0.5MG/DOS)  What changed: Another medication with the same name was added. Make sure you understand how and when to take each.  Changed by: KAIT Arciniega   0.25 mg, Subcutaneous, Weekly, 0.25mg x 4 doses, then increase to 0.5mg x 4 doses      Semaglutide (1 MG/DOSE) 4 MG/3ML solution pen-injector  Commonly known as: OZEMPIC  What changed: You were already taking a medication with the same name, and this prescription was added. Make sure you understand how and when to take each.  Changed by: KAIT Arciniega   1 mg, Subcutaneous, Weekly   Start Date: May 21, 2023        Continue These Medications      Instructions Start Date   amLODIPine 5 MG tablet  Commonly known as: NORVASC   TAKE ONE (1) TABLET BY MOUTH DAILY.      aspirin 81 MG EC tablet   81 mg, Oral, Daily      carvedilol 3.125 MG tablet  Commonly known as: COREG   TAKE ONE (1) TABLET BY MOUTH EVERY MORNING AND TWO (2) TABLETS EVERY EVENING.      CINNAMON PO   1 tablet, Oral, As Needed      Coenzyme Q10 10 MG capsule   200 mg, Oral, Daily      cyclobenzaprine 5 MG  tablet  Commonly known as: FLEXERIL   5 mg, Oral, 3 Times Daily PRN      FLAX SEEDS PO   1,000 mg, Oral, Daily      Garlic 500 MG tablet   1 tablet, Oral, Daily      Dalila Root 500 MG capsule   550 mg, Oral, Daily      metFORMIN 1000 MG tablet  Commonly known as: GLUCOPHAGE   1,000 mg, Oral, 2 Times Daily With Meals, IF BS IS LOW WILL ONLY TAKE 500 MG      tamsulosin 0.4 MG capsule 24 hr capsule  Commonly known as: FLOMAX   No dose, route, or frequency recorded.      vitamin D3 125 MCG (5000 UT) capsule capsule   5,000 Units, Oral, Daily                 **Dragon Disclaimer:   Much of this encounter note is an electronic transcription/translation of spoken language to printed text. The electronic translation of spoken language may permit erroneous, or at times, nonsensical words or phrases to be inadvertently transcribed. Although I have reviewed the note for such errors, some may still exist.

## 2023-06-05 ENCOUNTER — TELEPHONE (OUTPATIENT)
Dept: CARDIOLOGY | Facility: CLINIC | Age: 58
End: 2023-06-05
Payer: COMMERCIAL

## 2023-06-05 RX ORDER — SEMAGLUTIDE 1.34 MG/ML
INJECTION, SOLUTION SUBCUTANEOUS
Qty: 3 ML | Refills: 0 | Status: SHIPPED | OUTPATIENT
Start: 2023-06-05

## 2023-06-05 NOTE — TELEPHONE ENCOUNTER
06/05/23  12:32 EDT    Patient has previously tried pravastatin, atorvastatin, Livalo in the past.  He has also tried brand name Lipitor.  While on these medications he experienced severe calf and thigh muscle cramps that mainly occurred in the middle of the night.  He also experienced severe hands, thumb, joint pains in bilateral hands making it difficult for him to utilize his hands.  When coming off of statins the symptoms completely resolved.      KAIT Serrato  [facility]

## 2023-06-06 ENCOUNTER — HOSPITAL ENCOUNTER (OUTPATIENT)
Dept: CT IMAGING | Facility: HOSPITAL | Age: 58
Discharge: HOME | End: 2023-06-06
Admitting: NURSE PRACTITIONER
Payer: COMMERCIAL

## 2023-06-06 DIAGNOSIS — Z86.79 S/P ASCENDING AORTIC ANEURYSM REPAIR: ICD-10-CM

## 2023-06-06 DIAGNOSIS — Z98.890 S/P ASCENDING AORTIC ANEURYSM REPAIR: ICD-10-CM

## 2023-06-06 PROCEDURE — 71250 CT THORAX DX C-: CPT

## 2023-06-08 ENCOUNTER — TELEPHONE (OUTPATIENT)
Dept: CARDIAC SURGERY | Facility: CLINIC | Age: 58
End: 2023-06-08
Payer: COMMERCIAL

## 2023-06-08 NOTE — TELEPHONE ENCOUNTER
CALLED PT ON 6/8/23 TO TRY AND SCHEDULE HIM FOR HIS FOLLOW UP AFTER GETTING HIS CT. HE MADE AN APPT FOR 8/1/23 BUT PT STATES THE REPORT RESULTS HAVE STAYED THE SAME AND THERE IS NO SIGNIFICANT CHANGES. HE DOESN'T WANT TO TAKE TIME OF WORK DUE TO GETTING FIRED TO COME IN JUST TO BE TOLD EVERYTHING LOOKS OK AND HE IS DOING GOOD. WOULD LIKE HIS CT LOOKED AT BY SOMEONE AND IF THEY THINK HE HAS TO COME IN HE WILL. OTHERWISE, HE DOES NOT WANT TO COME IN FOR THE APPT AND WILL CANCEL.

## 2023-07-25 RX ORDER — SEMAGLUTIDE 1.34 MG/ML
INJECTION, SOLUTION SUBCUTANEOUS
Qty: 3 ML | Refills: 0 | Status: SHIPPED | OUTPATIENT
Start: 2023-07-25

## 2023-08-01 ENCOUNTER — OFFICE VISIT (OUTPATIENT)
Dept: CARDIAC SURGERY | Facility: CLINIC | Age: 58
End: 2023-08-01
Payer: COMMERCIAL

## 2023-08-01 VITALS
OXYGEN SATURATION: 98 % | BODY MASS INDEX: 25.68 KG/M2 | DIASTOLIC BLOOD PRESSURE: 81 MMHG | WEIGHT: 183.4 LBS | HEIGHT: 71 IN | SYSTOLIC BLOOD PRESSURE: 151 MMHG | TEMPERATURE: 97.7 F | HEART RATE: 88 BPM | RESPIRATION RATE: 20 BRPM

## 2023-08-01 DIAGNOSIS — Z86.79 S/P ASCENDING AORTIC ANEURYSM REPAIR: Primary | ICD-10-CM

## 2023-08-01 DIAGNOSIS — Z95.2 S/P AVR (AORTIC VALVE REPLACEMENT): ICD-10-CM

## 2023-08-01 DIAGNOSIS — Z98.890 S/P ASCENDING AORTIC ANEURYSM REPAIR: Primary | ICD-10-CM

## 2023-08-14 RX ORDER — SEMAGLUTIDE 1.34 MG/ML
INJECTION, SOLUTION SUBCUTANEOUS
Qty: 3 ML | Refills: 0 | Status: SHIPPED | OUTPATIENT
Start: 2023-08-14

## 2023-08-22 ENCOUNTER — OFFICE VISIT (OUTPATIENT)
Dept: CARDIOLOGY | Facility: CLINIC | Age: 58
End: 2023-08-22
Payer: COMMERCIAL

## 2023-08-22 VITALS
SYSTOLIC BLOOD PRESSURE: 134 MMHG | BODY MASS INDEX: 25.9 KG/M2 | WEIGHT: 185 LBS | HEIGHT: 71 IN | DIASTOLIC BLOOD PRESSURE: 76 MMHG | HEART RATE: 95 BPM

## 2023-08-22 DIAGNOSIS — R53.83 FATIGUE, UNSPECIFIED TYPE: ICD-10-CM

## 2023-08-22 DIAGNOSIS — Q23.1 CONGENITAL BICUSPID AORTIC VALVE: ICD-10-CM

## 2023-08-22 DIAGNOSIS — E78.01 FAMILIAL HYPERCHOLESTEROLEMIA: ICD-10-CM

## 2023-08-22 DIAGNOSIS — E11.9 TYPE 2 DIABETES MELLITUS WITHOUT COMPLICATION, WITHOUT LONG-TERM CURRENT USE OF INSULIN: Primary | ICD-10-CM

## 2023-08-22 DIAGNOSIS — Z95.3 STATUS POST AORTIC VALVE REPLACEMENT WITH TISSUE VALVE: ICD-10-CM

## 2023-08-22 PROCEDURE — 1160F RVW MEDS BY RX/DR IN RCRD: CPT | Performed by: NURSE PRACTITIONER

## 2023-08-22 PROCEDURE — 1159F MED LIST DOCD IN RCRD: CPT | Performed by: NURSE PRACTITIONER

## 2023-08-22 PROCEDURE — 3078F DIAST BP <80 MM HG: CPT | Performed by: NURSE PRACTITIONER

## 2023-08-22 PROCEDURE — 99214 OFFICE O/P EST MOD 30 MIN: CPT | Performed by: NURSE PRACTITIONER

## 2023-08-22 PROCEDURE — 3075F SYST BP GE 130 - 139MM HG: CPT | Performed by: NURSE PRACTITIONER

## 2023-08-22 NOTE — PROGRESS NOTES
"    CARDIOLOGY        Patient Name: Brigido Caraballo  :1965  Age: 58 y.o.  Primary Cardiologist: Lucio Win MD  Encounter Provider:  KAIT Arciniega    Date of Service: 23      CHIEF COMPLAINT / REASON FOR OFFICE VISIT     Follow-up, Cardiac Valve Problem, and Diabetes      HISTORY OF PRESENT ILLNESS       Heart Problem  Pertinent negatives include no chills, coughing, fever or myalgias.     Brigido Caraballo is a 58 y.o. male who presents today for reevaluation after starting Ozempic.    Pt has a  history significant for asending aortic aneurysm with repair on 10/29/2019, aortic valve stenosis with bicuspid aortic valve with repair on 10/29/2019, hypertension, obstructive sleep apnea, diabetes.    I have been following patient since March when he was started on semaglutide for type 2 diabetes.  Patient reports that he is still tolerating medication well without any adverse effects.  He is now near his goal weight.  He is complaining of generalized fatigue.  Denies chest discomfort, dyspnea at rest, palpitations, lightheadedness, edema.      The following portions of the patient's history were reviewed and updated as appropriate: allergies, current medications, past family history, past medical history, past social history, past surgical history and problem list.      VITAL SIGNS     Visit Vitals  /76   Pulse 95   Ht 180.3 cm (71\")   Wt 83.9 kg (185 lb)   BMI 25.80 kg/mý         Wt Readings from Last 3 Encounters:   23 83.9 kg (185 lb)   23 83.2 kg (183 lb 6.4 oz)   23 88.5 kg (195 lb)     Body mass index is 25.8 kg/mý.      REVIEW OF SYSTEMS   Review of Systems   Constitutional: Positive for malaise/fatigue. Negative for chills, fever, weight gain and weight loss.   Cardiovascular:  Negative for leg swelling.   Respiratory:  Negative for cough, snoring and wheezing.    Hematologic/Lymphatic: Negative for bleeding problem. Does not bruise/bleed easily.   Skin:  Negative for " color change.   Musculoskeletal:  Negative for falls, joint pain and myalgias.   Gastrointestinal:  Negative for melena.   Genitourinary:  Negative for hematuria.   Neurological:  Negative for excessive daytime sleepiness.   Psychiatric/Behavioral:  Negative for depression. The patient is not nervous/anxious.          PHYSICAL EXAMINATION     Constitutional:       Appearance: Normal appearance. Well-developed.   Eyes:      Conjunctiva/sclera: Conjunctivae normal.   Neck:      Vascular: No carotid bruit.   Pulmonary:      Effort: Pulmonary effort is normal.      Breath sounds: Normal breath sounds.   Cardiovascular:      Normal rate. Regular rhythm. Normal S1. Normal S2.       Murmurs: There is no murmur.      No gallop.  No click. No rub.   Edema:     Peripheral edema absent.   Musculoskeletal: Normal range of motion. Skin:     General: Skin is warm and dry.   Neurological:      Mental Status: Alert and oriented to person, place, and time.      GCS: GCS eye subscore is 4. GCS verbal subscore is 5. GCS motor subscore is 6.   Psychiatric:         Speech: Speech normal.         Behavior: Behavior normal.         Thought Content: Thought content normal.         Judgment: Judgment normal.         REVIEWED DATA       ECG 12 Lead    Date/Time: 8/22/2023 9:14 AM  Performed by: Bertha Benítez APRN  Authorized by: Bertha Benítez APRN   Comparison: compared with previous ECG   Rhythm: sinus rhythm  Rate: normal  BPM: 95  Conduction: conduction normal  ST Segments: ST segments normal  T Waves: T waves normal  QRS axis: left    Clinical impression: normal ECG        Cardiac Procedures:  Lexiscan cardiac stress test 9/25/2015.  No evidence of ischemia.  Echocardiogram 9/25/2015.  EF 63%.  Normal diastolic function.  Bicuspid aortic valve.  Mild to moderate aortic stenosis with peak gradient 34 mmHg and mean radiant 20 mmHg  Echocardiogram 8/12/2019.  EF 67%.  Grade 1 diastolic dysfunction noted.  Bicuspid aortic valve  with moderate to severe aortic valve stenosis.  Maximum pressure gradient 57 mmHg, mean pressure gradient 34 mmHg.  Calculated RVSP 29 mmHg.  A sending aorta dilated measuring 4.1 cm.  Myocardial perfusion stress test 8/12/2019.  No evidence of ischemia.  EF 59%.  ST segment depression 1 mm noted during stress beginning at 3 minutes of stress.  Baseline ST depression.  Patient complains of pain.  Cardiac catheterization 9/24/2019.  Normal coronary arteries.  Elevated LV filling pressures 27 mmHg.  Peak to peak gradient across the aortic valve is 53 mmHg.  Asending aortic aneurysm repair and aortic valve replacement 10/29/2019.  Echocardiogram 12/27/2019.  LVEF 61-65%.  Mild to moderate LVH.  Normal RV cavity size and systolic function.  LAE.  25 mm bovine pericardial bioprosthetic valve present.  Aortic valve peak and mean gradients are within defined limits.  Mild tricuspid valve regurgitation.  Small to moderate posterior pericardial effusion without evidence of tamponade.  Calculated RVSP 24 mmHg.  Echocardiogram 11/30/2020.  LVEF 61-65%.  Moderate LVH.  Grade 1 diastolic dysfunction.  Normal RV cavity size and systolic function.  LAE.  Prosthetic aortic valve with normal gradients.  Maximum pressure gradient 24 mmHg, mean pressure gradient 12 mmHg.  Calculated RVSP 30 mmHg.  CTA chest 12/19/2020.  Aortic root measures 2.8 cm in diameter.  Ascending aorta measures 3.6 cm in diameter compared to 5 cm on prior study.     Lipid panel 12/21/2022  Total cholesterol: 226  HDL: 42  Triglycerides: 171  LDL: 153    Hemoglobin A1c 12/21/2022  Hgb A1c 5.9        ASSESSMENT & PLAN     Diagnoses and all orders for this visit:    1. Type 2 diabetes mellitus without complication, without long-term current use of insulin (Primary)  Due for hemoglobin A1c  At goal weight with semaglutide  Discussed plan of weaning from metformin.  Patient will decrease by 0.5 tablets every week  Patient will get repeat lipid panel as well as  hemoglobin A1c  -     Lipid Panel; Future  -     Hemoglobin A1c; Future    2. Fatigue, unspecified type  Generalized fatigue.  B12 deficiency is known in patients who takes semaglutide.  We will check a B12 level  -     Vitamin B12; Future    3. Congenital bicuspid aortic valve  4. Status post aortic valve replacement with tissue valve  Gradient stable on most recent echocardiogram    5. Familial hypercholesterolemia  Due for repeat lipid panel  Patient has known statin intolerance  Should be receiving Leqvio infusions at the infusion center.          Future Appointments         Provider Department Center    9/1/2023 2:20 PM Adolfo Gifford MD [facility] CARDIOLOGY [place]    9/21/2023 3:00 PM REFERRED INJECTION CHAIR EP [facility] INFUSION [facility]    2/26/2024 10:20 AM Bertha Benítez APRN [facility] CARDIOLOGY [place]    3/22/2024 3:30 PM REFERRED INJECTION CHAIR EP [facility] INFUSION [facility]                  MEDICATIONS         Discharge Medications            Accurate as of August 22, 2023  9:43 AM. If you have any questions, ask your nurse or doctor.                Continue These Medications        Instructions Start Date   amLODIPine 5 MG tablet  Commonly known as: NORVASC   TAKE ONE (1) TABLET BY MOUTH DAILY.      aspirin 81 MG EC tablet   81 mg, Oral, Daily      carvedilol 3.125 MG tablet  Commonly known as: COREG   TAKE ONE (1) TABLET BY MOUTH EVERY MORNING AND TWO (2) TABLETS EVERY EVENING.      CINNAMON PO   1 tablet, Oral, As Needed      Coenzyme Q10 10 MG capsule   200 mg, Oral, Daily      FLAX SEEDS PO   1,000 mg, Oral, Daily      Garlic 500 MG tablet   1 tablet, Oral, Daily      Ginger Root 500 MG capsule   550 mg, Oral, Daily      metFORMIN 1000 MG tablet  Commonly known as: GLUCOPHAGE   1,000 mg, Oral, 2 Times Daily With Meals, IF BS IS LOW WILL ONLY TAKE 500 MG      Ozempic (1 MG/DOSE) 4 MG/3ML solution pen-injector  Generic drug:  Semaglutide (1 MG/DOSE)   INJECT ONE (1) MG UNDER THE SKIN INTO THE APPROPRIATE AREA AS DIRECTED ONE (1) (ONE) TIME PER WEEK.      tamsulosin 0.4 MG capsule 24 hr capsule  Commonly known as: FLOMAX   No dose, route, or frequency recorded.      vitamin D3 125 MCG (5000 UT) capsule capsule   5,000 Units, Oral, Daily                   **Dragon Disclaimer:   Much of this encounter note is an electronic transcription/translation of spoken language to printed text. The electronic translation of spoken language may permit erroneous, or at times, nonsensical words or phrases to be inadvertently transcribed. Although I have reviewed the note for such errors, some may still exist.

## 2023-09-12 RX ORDER — SEMAGLUTIDE 1.34 MG/ML
INJECTION, SOLUTION SUBCUTANEOUS
Qty: 3 ML | Refills: 0 | Status: SHIPPED | OUTPATIENT
Start: 2023-09-12

## 2023-09-21 ENCOUNTER — INFUSION (OUTPATIENT)
Dept: ONCOLOGY | Facility: HOSPITAL | Age: 58
End: 2023-09-21
Payer: COMMERCIAL

## 2023-09-21 VITALS
OXYGEN SATURATION: 99 % | HEART RATE: 78 BPM | WEIGHT: 189.2 LBS | HEIGHT: 71 IN | DIASTOLIC BLOOD PRESSURE: 85 MMHG | BODY MASS INDEX: 26.49 KG/M2 | SYSTOLIC BLOOD PRESSURE: 130 MMHG | RESPIRATION RATE: 15 BRPM | TEMPERATURE: 98.2 F

## 2023-09-21 DIAGNOSIS — E78.01 FAMILIAL HYPERCHOLESTEROLEMIA: ICD-10-CM

## 2023-09-21 DIAGNOSIS — E11.9 TYPE 2 DIABETES MELLITUS WITHOUT COMPLICATION, WITHOUT LONG-TERM CURRENT USE OF INSULIN: Primary | ICD-10-CM

## 2023-09-21 PROCEDURE — 25010000002 INCLISIRAN SODIUM 284 MG/1.5ML SOLUTION PREFILLED SYRINGE: Performed by: NURSE PRACTITIONER

## 2023-09-21 PROCEDURE — 96372 THER/PROPH/DIAG INJ SC/IM: CPT

## 2023-09-21 RX ADMIN — INCLISIRAN 284 MG: 284 INJECTION, SOLUTION SUBCUTANEOUS at 13:58

## 2023-09-21 NOTE — NURSING NOTE
Pt arrived for Levqio injection. Meds and Allergies reviewed. Levqio administered in left arm without incidence. Pt monitored for 15 min post injection and discharged in a stable condition.

## 2023-10-06 RX ORDER — SEMAGLUTIDE 1.34 MG/ML
INJECTION, SOLUTION SUBCUTANEOUS
Qty: 3 ML | Refills: 0 | Status: SHIPPED | OUTPATIENT
Start: 2023-10-06

## 2023-10-31 RX ORDER — SEMAGLUTIDE 1.34 MG/ML
INJECTION, SOLUTION SUBCUTANEOUS
Qty: 3 ML | Refills: 0 | Status: SHIPPED | OUTPATIENT
Start: 2023-10-31

## 2023-11-27 RX ORDER — SEMAGLUTIDE 1.34 MG/ML
INJECTION, SOLUTION SUBCUTANEOUS
Qty: 3 ML | Refills: 0 | Status: SHIPPED | OUTPATIENT
Start: 2023-11-27

## 2023-12-22 RX ORDER — SEMAGLUTIDE 1.34 MG/ML
INJECTION, SOLUTION SUBCUTANEOUS
Qty: 3 ML | Refills: 0 | Status: SHIPPED | OUTPATIENT
Start: 2023-12-22

## 2023-12-27 RX ORDER — AMLODIPINE BESYLATE 5 MG/1
TABLET ORAL
Qty: 90 TABLET | Refills: 3 | Status: SHIPPED | OUTPATIENT
Start: 2023-12-27

## 2024-01-16 RX ORDER — SEMAGLUTIDE 1.34 MG/ML
INJECTION, SOLUTION SUBCUTANEOUS
Qty: 3 ML | Refills: 0 | Status: SHIPPED | OUTPATIENT
Start: 2024-01-16

## 2024-01-29 RX ORDER — CARVEDILOL 3.12 MG/1
TABLET ORAL
Qty: 270 TABLET | Refills: 3 | Status: SHIPPED | OUTPATIENT
Start: 2024-01-29

## 2024-02-12 RX ORDER — SEMAGLUTIDE 1.34 MG/ML
INJECTION, SOLUTION SUBCUTANEOUS
Qty: 3 ML | Refills: 0 | Status: SHIPPED | OUTPATIENT
Start: 2024-02-12

## 2024-02-26 ENCOUNTER — LAB (OUTPATIENT)
Dept: LAB | Facility: HOSPITAL | Age: 59
End: 2024-02-26
Payer: COMMERCIAL

## 2024-02-26 ENCOUNTER — OFFICE VISIT (OUTPATIENT)
Age: 59
End: 2024-02-26
Payer: COMMERCIAL

## 2024-02-26 VITALS
WEIGHT: 191 LBS | SYSTOLIC BLOOD PRESSURE: 125 MMHG | DIASTOLIC BLOOD PRESSURE: 80 MMHG | BODY MASS INDEX: 37.5 KG/M2 | HEIGHT: 60 IN | OXYGEN SATURATION: 96 %

## 2024-02-26 DIAGNOSIS — E11.9 TYPE 2 DIABETES MELLITUS WITHOUT COMPLICATION, WITHOUT LONG-TERM CURRENT USE OF INSULIN: Primary | ICD-10-CM

## 2024-02-26 DIAGNOSIS — Z98.890 S/P VENTRICULAR SEPTAL MYECTOMY: ICD-10-CM

## 2024-02-26 DIAGNOSIS — Z95.2 S/P AVR (AORTIC VALVE REPLACEMENT): ICD-10-CM

## 2024-02-26 DIAGNOSIS — Q23.1 CONGENITAL BICUSPID AORTIC VALVE: ICD-10-CM

## 2024-02-26 DIAGNOSIS — E11.9 TYPE 2 DIABETES MELLITUS WITHOUT COMPLICATION, WITHOUT LONG-TERM CURRENT USE OF INSULIN: ICD-10-CM

## 2024-02-26 DIAGNOSIS — Z95.3 STATUS POST AORTIC VALVE REPLACEMENT WITH TISSUE VALVE: ICD-10-CM

## 2024-02-26 DIAGNOSIS — E78.01 FAMILIAL HYPERCHOLESTEROLEMIA: ICD-10-CM

## 2024-02-26 LAB
ALBUMIN SERPL-MCNC: 4.3 G/DL (ref 3.5–5.2)
ALBUMIN/GLOB SERPL: 1.8 G/DL
ALP SERPL-CCNC: 64 U/L (ref 39–117)
ALT SERPL W P-5'-P-CCNC: 21 U/L (ref 1–41)
ANION GAP SERPL CALCULATED.3IONS-SCNC: 9.9 MMOL/L (ref 5–15)
AST SERPL-CCNC: 14 U/L (ref 1–40)
BASOPHILS # BLD AUTO: 0.02 10*3/MM3 (ref 0–0.2)
BASOPHILS NFR BLD AUTO: 0.3 % (ref 0–1.5)
BILIRUB SERPL-MCNC: 1 MG/DL (ref 0–1.2)
BUN SERPL-MCNC: 14 MG/DL (ref 6–20)
BUN/CREAT SERPL: 11.6 (ref 7–25)
CALCIUM SPEC-SCNC: 9.4 MG/DL (ref 8.6–10.5)
CHLORIDE SERPL-SCNC: 105 MMOL/L (ref 98–107)
CHOLEST SERPL-MCNC: 135 MG/DL (ref 0–200)
CO2 SERPL-SCNC: 24.1 MMOL/L (ref 22–29)
CREAT SERPL-MCNC: 1.21 MG/DL (ref 0.76–1.27)
DEPRECATED RDW RBC AUTO: 38 FL (ref 37–54)
EGFRCR SERPLBLD CKD-EPI 2021: 70.3 ML/MIN/1.73
EOSINOPHIL # BLD AUTO: 0.08 10*3/MM3 (ref 0–0.4)
EOSINOPHIL NFR BLD AUTO: 1.4 % (ref 0.3–6.2)
ERYTHROCYTE [DISTWIDTH] IN BLOOD BY AUTOMATED COUNT: 12 % (ref 12.3–15.4)
GLOBULIN UR ELPH-MCNC: 2.4 GM/DL
GLUCOSE SERPL-MCNC: 87 MG/DL (ref 65–99)
HBA1C MFR BLD: 5.7 % (ref 4.8–5.6)
HCT VFR BLD AUTO: 46 % (ref 37.5–51)
HDLC SERPL-MCNC: 46 MG/DL (ref 40–60)
HGB BLD-MCNC: 15.3 G/DL (ref 13–17.7)
IMM GRANULOCYTES # BLD AUTO: 0.01 10*3/MM3 (ref 0–0.05)
IMM GRANULOCYTES NFR BLD AUTO: 0.2 % (ref 0–0.5)
LDLC SERPL CALC-MCNC: 69 MG/DL (ref 0–100)
LDLC/HDLC SERPL: 1.45 {RATIO}
LYMPHOCYTES # BLD AUTO: 1.62 10*3/MM3 (ref 0.7–3.1)
LYMPHOCYTES NFR BLD AUTO: 28.1 % (ref 19.6–45.3)
MCH RBC QN AUTO: 28.9 PG (ref 26.6–33)
MCHC RBC AUTO-ENTMCNC: 33.3 G/DL (ref 31.5–35.7)
MCV RBC AUTO: 87 FL (ref 79–97)
MONOCYTES # BLD AUTO: 0.41 10*3/MM3 (ref 0.1–0.9)
MONOCYTES NFR BLD AUTO: 7.1 % (ref 5–12)
NEUTROPHILS NFR BLD AUTO: 3.62 10*3/MM3 (ref 1.7–7)
NEUTROPHILS NFR BLD AUTO: 62.9 % (ref 42.7–76)
NRBC BLD AUTO-RTO: 0 /100 WBC (ref 0–0.2)
PLATELET # BLD AUTO: 255 10*3/MM3 (ref 140–450)
PMV BLD AUTO: 8.9 FL (ref 6–12)
POTASSIUM SERPL-SCNC: 4.2 MMOL/L (ref 3.5–5.2)
PROT SERPL-MCNC: 6.7 G/DL (ref 6–8.5)
RBC # BLD AUTO: 5.29 10*6/MM3 (ref 4.14–5.8)
SODIUM SERPL-SCNC: 139 MMOL/L (ref 136–145)
TRIGL SERPL-MCNC: 112 MG/DL (ref 0–150)
VLDLC SERPL-MCNC: 20 MG/DL (ref 5–40)
WBC NRBC COR # BLD AUTO: 5.76 10*3/MM3 (ref 3.4–10.8)

## 2024-02-26 PROCEDURE — 85025 COMPLETE CBC W/AUTO DIFF WBC: CPT

## 2024-02-26 PROCEDURE — 80053 COMPREHEN METABOLIC PANEL: CPT

## 2024-02-26 PROCEDURE — 3079F DIAST BP 80-89 MM HG: CPT | Performed by: NURSE PRACTITIONER

## 2024-02-26 PROCEDURE — 80061 LIPID PANEL: CPT

## 2024-02-26 PROCEDURE — 3074F SYST BP LT 130 MM HG: CPT | Performed by: NURSE PRACTITIONER

## 2024-02-26 PROCEDURE — 36415 COLL VENOUS BLD VENIPUNCTURE: CPT

## 2024-02-26 PROCEDURE — 83036 HEMOGLOBIN GLYCOSYLATED A1C: CPT

## 2024-02-26 PROCEDURE — 99214 OFFICE O/P EST MOD 30 MIN: CPT | Performed by: NURSE PRACTITIONER

## 2024-02-26 NOTE — PROGRESS NOTES
"    CARDIOLOGY        Patient Name: Brigido Caraballo  :1965  Age: 58 y.o.  Primary Cardiologist: Lucio Win MD  Encounter Provider:  KAIT Arciniega    Date of Service: 24      CHIEF COMPLAINT / REASON FOR OFFICE VISIT     Follow-Up, Cardiac Valve Problem, and Diabetes Mellitus      HISTORY OF PRESENT ILLNESS       Heart Problem  Pertinent negatives include no chills, coughing, fever or myalgias.       Brigido Caraballo is a 58 y.o. male who presents today for reevaluation after starting Ozempic.    Pt has a  history significant for asending aortic aneurysm with repair on 10/29/2019, aortic valve stenosis with bicuspid aortic valve with repair on 10/29/2019, hypertension, obstructive sleep apnea, diabetes.    I have been following patient since 2023 when he was started on semaglutide for type 2 diabetes as well as weight management.  Patient reports that he has done well since last assessment.  Patient does reports that he is plateaued with his weight loss.  Patient reports that he sometimes has heart palpitations that occur when he gets up in the middle of the night to go to the restroom.  He states that his heart will race for approximately 5 to 10 minutes and then improved.  He is otherwise asymptomatic.    The following portions of the patient's history were reviewed and updated as appropriate: allergies, current medications, past family history, past medical history, past social history, past surgical history and problem list.      VITAL SIGNS     Visit Vitals  /80 (BP Location: Left arm, Patient Position: Sitting)   Ht 71 cm (27.95\")   Wt 86.6 kg (191 lb)   SpO2 96%   .86 kg/m²         Wt Readings from Last 3 Encounters:   24 86.6 kg (191 lb)   23 85.8 kg (189 lb 3.2 oz)   23 83.9 kg (185 lb)     Body mass index is 171.86 kg/m².      REVIEW OF SYSTEMS   Review of Systems   Constitutional: Positive for malaise/fatigue. Negative for chills, fever, weight " gain and weight loss.   Cardiovascular:  Positive for palpitations. Negative for leg swelling.   Respiratory:  Negative for cough, snoring and wheezing.    Hematologic/Lymphatic: Negative for bleeding problem. Does not bruise/bleed easily.   Skin:  Negative for color change.   Musculoskeletal:  Negative for falls, joint pain and myalgias.   Gastrointestinal:  Negative for melena.   Genitourinary:  Negative for hematuria.   Neurological:  Negative for excessive daytime sleepiness.   Psychiatric/Behavioral:  Negative for depression. The patient is not nervous/anxious.            PHYSICAL EXAMINATION     Constitutional:       Appearance: Normal appearance. Well-developed.   Eyes:      Conjunctiva/sclera: Conjunctivae normal.   Neck:      Vascular: No carotid bruit.   Pulmonary:      Effort: Pulmonary effort is normal.      Breath sounds: Normal breath sounds.   Cardiovascular:      Normal rate. Regular rhythm. Normal S1. Normal S2.       Murmurs: There is no murmur.      No gallop.  No click. No rub.   Edema:     Peripheral edema absent.   Musculoskeletal: Normal range of motion. Skin:     General: Skin is warm and dry.   Neurological:      Mental Status: Alert and oriented to person, place, and time.      GCS: GCS eye subscore is 4. GCS verbal subscore is 5. GCS motor subscore is 6.   Psychiatric:         Speech: Speech normal.         Behavior: Behavior normal.         Thought Content: Thought content normal.         Judgment: Judgment normal.           REVIEWED DATA     Procedures    Cardiac Procedures:  Lexiscan cardiac stress test 9/25/2015.  No evidence of ischemia.  Echocardiogram 9/25/2015.  EF 63%.  Normal diastolic function.  Bicuspid aortic valve.  Mild to moderate aortic stenosis with peak gradient 34 mmHg and mean radiant 20 mmHg  Echocardiogram 8/12/2019.  EF 67%.  Grade 1 diastolic dysfunction noted.  Bicuspid aortic valve with moderate to severe aortic valve stenosis.  Maximum pressure gradient 57 mmHg,  mean pressure gradient 34 mmHg.  Calculated RVSP 29 mmHg.  A sending aorta dilated measuring 4.1 cm.  Myocardial perfusion stress test 8/12/2019.  No evidence of ischemia.  EF 59%.  ST segment depression 1 mm noted during stress beginning at 3 minutes of stress.  Baseline ST depression.  Patient complains of pain.  Cardiac catheterization 9/24/2019.  Normal coronary arteries.  Elevated LV filling pressures 27 mmHg.  Peak to peak gradient across the aortic valve is 53 mmHg.  Asending aortic aneurysm repair and aortic valve replacement 10/29/2019.  Echocardiogram 12/27/2019.  LVEF 61-65%.  Mild to moderate LVH.  Normal RV cavity size and systolic function.  LAE.  25 mm bovine pericardial bioprosthetic valve present.  Aortic valve peak and mean gradients are within defined limits.  Mild tricuspid valve regurgitation.  Small to moderate posterior pericardial effusion without evidence of tamponade.  Calculated RVSP 24 mmHg.  Echocardiogram 11/30/2020.  LVEF 61-65%.  Moderate LVH.  Grade 1 diastolic dysfunction.  Normal RV cavity size and systolic function.  LAE.  Prosthetic aortic valve with normal gradients.  Maximum pressure gradient 24 mmHg, mean pressure gradient 12 mmHg.  Calculated RVSP 30 mmHg.  CTA chest 12/19/2020.  Aortic root measures 2.8 cm in diameter.  Ascending aorta measures 3.6 cm in diameter compared to 5 cm on prior study.  Lipid Panel          8/24/2023    09:09 2/26/2024    11:41   Lipid Panel   Total Cholesterol  135    Total Cholesterol 130     Triglycerides 86  112    HDL Cholesterol 44  46    VLDL Cholesterol 17  20    LDL Cholesterol  69  69    LDL/HDL Ratio  1.45            ASSESSMENT & PLAN     Diagnoses and all orders for this visit:    1. Type 2 diabetes mellitus without complication, without long-term current use of insulin (Primary)  Assessment & Plan:  Patient is on semaglutide, has noted increased weight gain, recommend increasing to 2 mg/week  Will get repeat hemoglobin A1c  At time of  dictation patient's laboratory studies have resulted.  Results have been communicated to patient.    Orders:  -     Hemoglobin A1c; Future  -     Lipid Panel; Future  -     Comprehensive Metabolic Panel; Future  -     CBC & Differential; Future    2. Congenital bicuspid aortic valve  Assessment & Plan:  Due for repeat echocardiogram    Orders:  -     Adult Transthoracic Echo Complete W/ Cont if Necessary Per Protocol; Future    3. Status post aortic valve replacement with tissue valve  Assessment & Plan:  Patient reports intermittent episodes of chest discomfort that are reproducible with palpation.  Due for surveillance echocardiogram, this will be arranged    Orders:  -     Adult Transthoracic Echo Complete W/ Cont if Necessary Per Protocol; Future    4. S/P ventricular septal myectomy    5. S/P AVR (aortic valve replacement)    6. Familial hypercholesterolemia  Assessment & Plan:   Patient with familial hypercholesterolemia  Known statin intolerance  Now on inclisiran injections, due for repeat lipid panel      Other orders  -     Semaglutide, 2 MG/DOSE, (OZEMPIC) 8 MG/3ML solution pen-injector; Inject 2 mg under the skin into the appropriate area as directed 1 (One) Time Per Week.  Dispense: 3 mL; Refill: 3            Future Appointments         Provider Department Center    3/7/2024 1:15 PM [facility] ECHO/[facility] RM 1 [facility] OUTPATIENT ECHOCARDIOGRAPHY [place]    3/22/2024 3:30 PM REFERRED INJECTION CHAIR EP [facility] INFUSION [facility]              MEDICATIONS         Discharge Medications            Accurate as of February 26, 2024 12:42 PM. If you have any questions, ask your nurse or doctor.                New Medications        Instructions Start Date   Semaglutide (2 MG/DOSE) 8 MG/3ML solution pen-injector  Commonly known as: OZEMPIC  Replaces: Ozempic (1 MG/DOSE) 4 MG/3ML solution pen-injector  Started by: KAIT Arciniega   2 mg, Subcutaneous, Weekly             Continue  These Medications        Instructions Start Date   amLODIPine 5 MG tablet  Commonly known as: NORVASC   TAKE ONE (1) TABLET BY MOUTH DAILY.      aspirin 81 MG EC tablet   81 mg, Oral, Daily      carvedilol 3.125 MG tablet  Commonly known as: COREG   TAKE ONE (1) TABLET BY MOUTH EVERY MORNING AND TWO (2) TABLETS EVERY EVENING.      CINNAMON PO   1 tablet, Oral, As Needed      Coenzyme Q10 10 MG capsule   200 mg, Oral, Daily      FLAX SEEDS PO   1,000 mg, Oral, Daily      Garlic 500 MG tablet   1 tablet, Oral, Daily      Ginger Root 500 MG capsule   550 mg, Oral, Daily      tamsulosin 0.4 MG capsule 24 hr capsule  Commonly known as: FLOMAX   No dose, route, or frequency recorded.      vitamin D3 125 MCG (5000 UT) capsule capsule   5,000 Units, Oral, Daily             Stop These Medications      metFORMIN 1000 MG tablet  Commonly known as: GLUCOPHAGE  Stopped by: KAIT Arciniega     Ozempic (1 MG/DOSE) 4 MG/3ML solution pen-injector  Generic drug: Semaglutide (1 MG/DOSE)  Replaced by: Semaglutide (2 MG/DOSE) 8 MG/3ML solution pen-injector  Stopped by: KAIT Arciniega                  **Dragon Disclaimer:   Much of this encounter note is an electronic transcription/translation of spoken language to printed text. The electronic translation of spoken language may permit erroneous, or at times, nonsensical words or phrases to be inadvertently transcribed. Although I have reviewed the note for such errors, some may still exist.

## 2024-02-26 NOTE — ASSESSMENT & PLAN NOTE
Patient with familial hypercholesterolemia  Known statin intolerance  Now on inclisiran injections, due for repeat lipid panel

## 2024-02-26 NOTE — ASSESSMENT & PLAN NOTE
Patient reports intermittent episodes of chest discomfort that are reproducible with palpation.  Due for surveillance echocardiogram, this will be arranged

## 2024-02-26 NOTE — PROGRESS NOTES
Labs overall look very stable.  Hemoglobin A1c is slightly elevated at 5.7, it was 5.36 months ago.  I do agree with increasing the semaglutide dosing.  We will continue to watch labs with follow-up.

## 2024-02-26 NOTE — ASSESSMENT & PLAN NOTE
Patient is on semaglutide, has noted increased weight gain, recommend increasing to 2 mg/week  Will get repeat hemoglobin A1c  At time of dictation patient's laboratory studies have resulted.  Results have been communicated to patient.

## 2024-03-14 ENCOUNTER — TELEPHONE (OUTPATIENT)
Dept: CARDIOLOGY | Facility: CLINIC | Age: 59
End: 2024-03-14
Payer: COMMERCIAL

## 2024-03-20 ENCOUNTER — HOSPITAL ENCOUNTER (OUTPATIENT)
Dept: CARDIOLOGY | Facility: HOSPITAL | Age: 59
Discharge: HOME | End: 2024-03-20
Admitting: NURSE PRACTITIONER
Payer: COMMERCIAL

## 2024-03-20 VITALS
BODY MASS INDEX: 26.74 KG/M2 | SYSTOLIC BLOOD PRESSURE: 112 MMHG | DIASTOLIC BLOOD PRESSURE: 64 MMHG | WEIGHT: 191 LBS | HEIGHT: 71 IN

## 2024-03-20 DIAGNOSIS — Z95.3 STATUS POST AORTIC VALVE REPLACEMENT WITH TISSUE VALVE: ICD-10-CM

## 2024-03-20 DIAGNOSIS — Q23.1 CONGENITAL BICUSPID AORTIC VALVE: ICD-10-CM

## 2024-03-20 PROCEDURE — 93306 TTE W/DOPPLER COMPLETE: CPT

## 2024-03-21 LAB
AORTIC DIMENSIONLESS INDEX: 0.5 (DI)
ASCENDING AORTA: 2.9 CM
BH CV ECHO MEAS - ACS: 1.71 CM
BH CV ECHO MEAS - AO MAX PG: 26.8 MMHG
BH CV ECHO MEAS - AO MEAN PG: 13.1 MMHG
BH CV ECHO MEAS - AO ROOT DIAM: 3.1 CM
BH CV ECHO MEAS - AO V2 MAX: 258.8 CM/SEC
BH CV ECHO MEAS - AO V2 VTI: 51.2 CM
BH CV ECHO MEAS - AVA(I,D): 1.9 CM2
BH CV ECHO MEAS - EDV(CUBED): 87.1 ML
BH CV ECHO MEAS - EDV(MOD-SP2): 62 ML
BH CV ECHO MEAS - EDV(MOD-SP4): 61 ML
BH CV ECHO MEAS - EF(MOD-BP): 62 %
BH CV ECHO MEAS - EF(MOD-SP2): 62.9 %
BH CV ECHO MEAS - EF(MOD-SP4): 60.7 %
BH CV ECHO MEAS - ESV(CUBED): 17.9 ML
BH CV ECHO MEAS - ESV(MOD-SP2): 23 ML
BH CV ECHO MEAS - ESV(MOD-SP4): 24 ML
BH CV ECHO MEAS - FS: 41 %
BH CV ECHO MEAS - IVS/LVPW: 0.96 CM
BH CV ECHO MEAS - IVSD: 1.18 CM
BH CV ECHO MEAS - LAT PEAK E' VEL: 9.8 CM/SEC
BH CV ECHO MEAS - LV DIASTOLIC VOL/BSA (35-75): 29.5 CM2
BH CV ECHO MEAS - LV MASS(C)D: 194.4 GRAMS
BH CV ECHO MEAS - LV MAX PG: 4.4 MMHG
BH CV ECHO MEAS - LV MEAN PG: 2.6 MMHG
BH CV ECHO MEAS - LV SYSTOLIC VOL/BSA (12-30): 11.6 CM2
BH CV ECHO MEAS - LV V1 MAX: 104.9 CM/SEC
BH CV ECHO MEAS - LV V1 VTI: 24.4 CM
BH CV ECHO MEAS - LVIDD: 4.4 CM
BH CV ECHO MEAS - LVIDS: 2.6 CM
BH CV ECHO MEAS - LVOT AREA: 4 CM2
BH CV ECHO MEAS - LVOT DIAM: 2.25 CM
BH CV ECHO MEAS - LVPWD: 1.23 CM
BH CV ECHO MEAS - MED PEAK E' VEL: 7 CM/SEC
BH CV ECHO MEAS - MV A DUR: 0.11 SEC
BH CV ECHO MEAS - MV A MAX VEL: 65 CM/SEC
BH CV ECHO MEAS - MV DEC SLOPE: 332.4 CM/SEC2
BH CV ECHO MEAS - MV DEC TIME: 0.24 SEC
BH CV ECHO MEAS - MV E MAX VEL: 79.4 CM/SEC
BH CV ECHO MEAS - MV E/A: 1.22
BH CV ECHO MEAS - MV MAX PG: 3.8 MMHG
BH CV ECHO MEAS - MV MEAN PG: 1.74 MMHG
BH CV ECHO MEAS - MV P1/2T: 75 MSEC
BH CV ECHO MEAS - MV V2 VTI: 23.9 CM
BH CV ECHO MEAS - MVA(P1/2T): 2.9 CM2
BH CV ECHO MEAS - MVA(VTI): 4.1 CM2
BH CV ECHO MEAS - PA ACC TIME: 0.14 SEC
BH CV ECHO MEAS - PA V2 MAX: 141.2 CM/SEC
BH CV ECHO MEAS - PULM A REVS DUR: 0.11 SEC
BH CV ECHO MEAS - PULM A REVS VEL: 49.1 CM/SEC
BH CV ECHO MEAS - PULM DIAS VEL: 42.1 CM/SEC
BH CV ECHO MEAS - PULM S/D: 0.99
BH CV ECHO MEAS - PULM SYS VEL: 41.6 CM/SEC
BH CV ECHO MEAS - RAP SYSTOLE: 3 MMHG
BH CV ECHO MEAS - RV MAX PG: 4.4 MMHG
BH CV ECHO MEAS - RV V1 MAX: 105.1 CM/SEC
BH CV ECHO MEAS - RV V1 VTI: 22 CM
BH CV ECHO MEAS - RVSP: 12 MMHG
BH CV ECHO MEAS - SI(MOD-SP2): 18.9 ML/M2
BH CV ECHO MEAS - SI(MOD-SP4): 17.9 ML/M2
BH CV ECHO MEAS - SV(LVOT): 97.4 ML
BH CV ECHO MEAS - SV(MOD-SP2): 39 ML
BH CV ECHO MEAS - SV(MOD-SP4): 37 ML
BH CV ECHO MEAS - TAPSE (>1.6): 1.55 CM
BH CV ECHO MEAS - TR MAX PG: 9.2 MMHG
BH CV ECHO MEAS - TR MAX VEL: 152 CM/SEC
BH CV ECHO MEASUREMENTS AVERAGE E/E' RATIO: 9.45
BH CV XLRA - RV BASE: 3.9 CM
BH CV XLRA - RV LENGTH: 7.9 CM
BH CV XLRA - RV MID: 2.7 CM
BH CV XLRA - TDI S': 9.8 CM/SEC
LEFT ATRIUM VOLUME INDEX: 15.6 ML/M2
SINUS: 2.9 CM
STJ: 2.9 CM

## 2024-03-21 NOTE — PROGRESS NOTES
Brigido,    Just wanted to let you know that your echocardiogram is normal.  Aortic valve is functioning appropriately.  No signs of heart failure.  Please let me know if I can be of any other assistance.    Lilia

## 2024-03-22 ENCOUNTER — INFUSION (OUTPATIENT)
Dept: ONCOLOGY | Facility: HOSPITAL | Age: 59
End: 2024-03-22
Payer: COMMERCIAL

## 2024-03-22 DIAGNOSIS — E78.01 FAMILIAL HYPERCHOLESTEROLEMIA: ICD-10-CM

## 2024-03-22 DIAGNOSIS — E11.9 TYPE 2 DIABETES MELLITUS WITHOUT COMPLICATION, WITHOUT LONG-TERM CURRENT USE OF INSULIN: Primary | ICD-10-CM

## 2024-03-22 PROCEDURE — 25010000002 INCLISIRAN SODIUM 284 MG/1.5ML SOLUTION PREFILLED SYRINGE: Performed by: NURSE PRACTITIONER

## 2024-03-22 PROCEDURE — 96372 THER/PROPH/DIAG INJ SC/IM: CPT

## 2024-03-22 RX ADMIN — INCLISIRAN 284 MG: 284 INJECTION, SOLUTION SUBCUTANEOUS at 14:18

## 2024-03-22 NOTE — NURSING NOTE
Pt arrived for Leqvio injection with no new complaints. Pt tolerated injection well without complication. Pt instructed on how to schedule future appointments and encouraged to call his ordering provider with any questions or concerns. He v/u and was discharged in stable condition.

## 2024-06-17 RX ORDER — SEMAGLUTIDE 2.68 MG/ML
INJECTION, SOLUTION SUBCUTANEOUS
Qty: 3 ML | Refills: 3 | Status: SHIPPED | OUTPATIENT
Start: 2024-06-17

## 2024-06-24 ENCOUNTER — TELEPHONE (OUTPATIENT)
Dept: CARDIOLOGY | Facility: CLINIC | Age: 59
End: 2024-06-24
Payer: COMMERCIAL

## 2024-06-24 NOTE — TELEPHONE ENCOUNTER
Any coffee or stimulants can cause increased and blood pressure.  Okay to try but may want to start mixing with decaf to see how he responds.  If his blood pressure spikes he will need to stop this coffee.

## 2024-06-24 NOTE — TELEPHONE ENCOUNTER
Notified Winsome of recommendations. She verbalized understanding.    Mechelle Bowles RN  [place] [facility] [place]

## 2024-06-24 NOTE — TELEPHONE ENCOUNTER
Patient's wife, Winsome, calls because the patient is wanting to try Ryze Mushroom coffee and on the label it says to ask your healthcare provider before taking if you are on BP meds. They are wanting to know if it is ok for the patient to take this.     Mechelle Bowles RN  [place] [facility] [place]

## 2024-07-03 DIAGNOSIS — Z86.79 S/P ASCENDING AORTIC ANEURYSM REPAIR: Primary | ICD-10-CM

## 2024-07-03 DIAGNOSIS — Z98.890 S/P ASCENDING AORTIC ANEURYSM REPAIR: Primary | ICD-10-CM

## 2024-08-07 ENCOUNTER — HOSPITAL ENCOUNTER (OUTPATIENT)
Dept: CT IMAGING | Facility: HOSPITAL | Age: 59
Discharge: HOME | End: 2024-08-07
Admitting: NURSE PRACTITIONER
Payer: COMMERCIAL

## 2024-08-07 DIAGNOSIS — Z98.890 S/P ASCENDING AORTIC ANEURYSM REPAIR: ICD-10-CM

## 2024-08-07 DIAGNOSIS — Z86.79 S/P ASCENDING AORTIC ANEURYSM REPAIR: ICD-10-CM

## 2024-08-07 PROCEDURE — 71250 CT THORAX DX C-: CPT

## 2024-09-03 ENCOUNTER — OFFICE VISIT (OUTPATIENT)
Dept: CARDIAC SURGERY | Facility: CLINIC | Age: 59
End: 2024-09-03
Payer: COMMERCIAL

## 2024-09-03 VITALS
HEIGHT: 71 IN | TEMPERATURE: 97.5 F | DIASTOLIC BLOOD PRESSURE: 78 MMHG | BODY MASS INDEX: 26.18 KG/M2 | RESPIRATION RATE: 20 BRPM | HEART RATE: 78 BPM | WEIGHT: 187 LBS | OXYGEN SATURATION: 99 % | SYSTOLIC BLOOD PRESSURE: 116 MMHG

## 2024-09-03 DIAGNOSIS — E78.2 MIXED HYPERLIPIDEMIA: ICD-10-CM

## 2024-09-03 DIAGNOSIS — I35.0 AORTIC STENOSIS, SEVERE: ICD-10-CM

## 2024-09-03 DIAGNOSIS — Z98.890 S/P ASCENDING AORTIC ANEURYSM REPAIR: Primary | ICD-10-CM

## 2024-09-03 DIAGNOSIS — Z98.890 S/P ASCENDING AORTIC ANEURYSM REPAIR: ICD-10-CM

## 2024-09-03 DIAGNOSIS — Q23.1 CONGENITAL BICUSPID AORTIC VALVE: ICD-10-CM

## 2024-09-03 DIAGNOSIS — I10 HTN (HYPERTENSION), BENIGN: ICD-10-CM

## 2024-09-03 DIAGNOSIS — Z86.79 S/P ASCENDING AORTIC ANEURYSM REPAIR: ICD-10-CM

## 2024-09-03 DIAGNOSIS — Z95.2 S/P AVR (AORTIC VALVE REPLACEMENT): ICD-10-CM

## 2024-09-03 DIAGNOSIS — Z86.79 S/P ASCENDING AORTIC ANEURYSM REPAIR: Primary | ICD-10-CM

## 2024-09-03 DIAGNOSIS — I71.21 ANEURYSM OF ASCENDING AORTA WITHOUT RUPTURE: Primary | ICD-10-CM

## 2024-09-03 PROCEDURE — 99214 OFFICE O/P EST MOD 30 MIN: CPT | Performed by: NURSE PRACTITIONER

## 2024-09-03 PROCEDURE — 3078F DIAST BP <80 MM HG: CPT | Performed by: NURSE PRACTITIONER

## 2024-09-03 PROCEDURE — 1160F RVW MEDS BY RX/DR IN RCRD: CPT | Performed by: NURSE PRACTITIONER

## 2024-09-03 PROCEDURE — 1159F MED LIST DOCD IN RCRD: CPT | Performed by: NURSE PRACTITIONER

## 2024-09-03 PROCEDURE — 3074F SYST BP LT 130 MM HG: CPT | Performed by: NURSE PRACTITIONER

## 2024-09-03 NOTE — PROGRESS NOTES
9/3/2024      Subjective:      Gavin Mckeon MD    Chief Complaint: Follow-up ascending and aortic arch aneurysms, bicuspid aortopathy, and severe bicuspid aortic stenosis--s/p surgical repair    History of Present Illness:       Dear Gavin Cadena MD and Colleagues,    It was nice to see Brigido Caraballo in [facility] for follow-up. He is a 59 y.o. male with ascending and aortic arch aneurysms, bicuspid aortopathy, bicuspid aortic valve with severe aortic stenosis, asymmetric septal hypertrophy, HTN, HLD, DM type 2 with Ozempic treatment, former tobacco abuse, and ZENON.  His aortic stenosis was originally diagnosed in  and was documented as mild.  He was seen by Dr. Shell in 2019 after worsening symptoms and a CT scan showing a 5 cm ascending aorta, 3.8 to 4 cm proximal arch, and mild dilatation of the aortic root.  He subsequently underwent ascending aortic aneurysm repair with 28 mm graft, proximal aortic arch repair with 28 mm graft and a hemiarch configuration, tissue AVR (25 mm Magna pericardial valve), and septal myectomy by Dr. Shell 10/29/2019.  He denies any change to his health history since last being seen.  He comes in today for routine follow-up for aneurysm surveillence.  The CT of chest without contrast on 2024 was reviewed.  The aortic root measures 3.1 cm, ascending aorta measures 3.6 cm, and DTA measures 2.6 cm.  These measurements are stable and without any post repair dilatation.  Transthoracic echo 2024 showed EF 60%, normal functioning bioprosthetic aortic valve, and trace TR.  He denies shortness of breath, chest/back pain, numbness/tingling/pain of extremities.  No vascular deficiencies or hyperextensible or hypermobile joints are noted on exam.  The patient's family history is positive for aneurysms.  His maternal grandmother  from a brain aneurysm.  Family history is negative for aortic dissections, negative for connective tissue  disease, and negative for coronary disease in first degree family members.  His mother had a bicuspid aortic valve and underwent AVR followed by a TAVR.  He remains active.  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  His BP today is 116/78. He is with his wife for the appt today.        Patient Active Problem List   Diagnosis    Ascending aortic aneurysm    Obesity (BMI 30-39.9)    Aortic stenosis, severe    ZENON (obstructive sleep apnea)    HTN (hypertension), benign    Congenital bicuspid aortic valve    Abnormal EKG    Type 2 diabetes mellitus without complication, without long-term current use of insulin    Nonrheumatic aortic valve stenosis    Thoracic aortic aneurysm without rupture    S/P AVR (aortic valve replacement)    S/P ventricular septal myectomy    S/P ascending aortic aneurysm repair    Hyperlipidemia    Familial hypercholesterolemia    Status post aortic valve replacement with tissue valve       Past Medical History:   Diagnosis Date    Abnormal EKG     Baseline anterolateral T wave abnormalities     Aneurysm     Aortic aneurysm     Aortic stenosis     Arthritis     THUMBS    Bicuspid aortic valve     Diabetes 2011    Heart murmur     Hyperlipidemia     Hypertension     ZENON (obstructive sleep apnea)     Intolerant to CPAP  TESTED 20 YRS AGO  NO PROBLEMS NOW USES  NOSE STRIP AT NIGHT       Past Surgical History:   Procedure Laterality Date    AORTIC VALVE REPAIR/REPLACEMENT      ARTERIAL ANEURYSM REPAIR      ASCENDING ARCH/HEMIARCH REPLACEMENT N/A 10/29/2019    Procedure: INTRAOPERATIVE RICHARD; STERNOTOMY THORACIC AORTIC ANEURYSM ASCENDING ARCH REPAIR WITH CIRCULATORY ARREST; AORTIC VALVE REPLACEMENT AND PRP.;  Surgeon: Felipe Shell MD;  Location: [facility];  Service: Cardiothoracic    CARDIAC CATHETERIZATION N/A 09/24/2019    Procedure: Left Heart Cath (pre-op aortic valve replacement and aortic aneurysm repair);  Surgeon: Crow Ely MD;  Location: [facility] INVASIVE  LOCATION;  Service: Cardiovascular       No Known Allergies      Current Outpatient Medications:     amLODIPine (NORVASC) 5 MG tablet, TAKE ONE (1) TABLET BY MOUTH DAILY., Disp: 90 tablet, Rfl: 3    aspirin 81 MG EC tablet, Take 1 tablet by mouth Daily., Disp: , Rfl:     carvedilol (COREG) 3.125 MG tablet, TAKE ONE (1) TABLET BY MOUTH EVERY MORNING AND TWO (2) TABLETS EVERY EVENING., Disp: 270 tablet, Rfl: 3    Cholecalciferol (VITAMIN D3) 5000 units capsule capsule, Take 1 capsule by mouth Daily., Disp: , Rfl:     CINNAMON PO, Take 1 tablet by mouth As Needed., Disp: , Rfl:     Coenzyme Q10 10 MG capsule, Take 200 mg by mouth Daily., Disp: , Rfl:     Flaxseed, Linseed, (FLAX SEEDS PO), Take 1,000 mg by mouth Daily., Disp: , Rfl:     Garlic 500 MG tablet, Take 1 tablet by mouth Daily., Disp: , Rfl:     Ginger, Zingiber officinalis, (GINGER ROOT) 500 MG capsule, Take 550 mg by mouth Daily., Disp: , Rfl:     Ozempic, 2 MG/DOSE, 8 MG/3ML solution pen-injector, INJECT TWO (2) MG UNDER THE SKIN INTO THE APPROPRIATE AREA AS DIRECTED ONE (1) (ONE) TIME PER WEEK., Disp: 3 mL, Rfl: 3    tamsulosin (FLOMAX) 0.4 MG capsule 24 hr capsule, , Disp: , Rfl:     Social History     Socioeconomic History    Marital status:    Tobacco Use    Smoking status: Former     Current packs/day: 0.00     Average packs/day: 1 pack/day for 20.0 years (20.0 ttl pk-yrs)     Types: Cigarettes     Start date:      Quit date:      Years since quittin.6    Smokeless tobacco: Never    Tobacco comments:     Smoked 1 ppd for 25 years - quit 2011   Vaping Use    Vaping status: Never Used   Substance and Sexual Activity    Alcohol use: Yes     Comment: rarely / on holidays    Drug use: No    Sexual activity: Defer       Family History   Problem Relation Age of Onset    Coronary artery disease Mother     Aortic stenosis Mother     Stroke Mother     Diabetes Father     Stroke Father     Aneurysm Maternal Grandmother     Lalit  Hyperthermia Neg Hx            Review of Systems:  Review of Systems   Respiratory:  Negative for shortness of breath.    Cardiovascular:  Negative for chest pain, palpitations and leg swelling.   Neurological:  Negative for dizziness and light-headedness.       Physical Exam:    Vital Signs:  Weight: 84.8 kg (187 lb)   Body mass index is 26.08 kg/m².  Temp: 97.5 °F (36.4 °C)   Heart Rate: 78   BP: 116/78     Physical Exam  Vitals and nursing note reviewed.   Constitutional:       General: He is awake.      Appearance: Normal appearance. He is well-developed and well-groomed.   HENT:      Head: Normocephalic and atraumatic.      Nose: Nose normal.      Mouth/Throat:      Lips: Pink.      Mouth: Mucous membranes are moist.      Pharynx: Uvula midline.   Eyes:      General: Lids are normal. No scleral icterus.     Extraocular Movements: Extraocular movements intact.      Conjunctiva/sclera: Conjunctivae normal.      Pupils: Pupils are equal, round, and reactive to light.   Neck:      Thyroid: No thyroid mass or thyromegaly.      Vascular: Normal carotid pulses. No carotid bruit, hepatojugular reflux or JVD.      Trachea: Trachea normal.   Cardiovascular:      Rate and Rhythm: Normal rate and regular rhythm.      Pulses:           Carotid pulses are 2+ on the right side and 2+ on the left side.       Radial pulses are 2+ on the right side and 2+ on the left side.        Femoral pulses are 2+ on the right side and 2+ on the left side.       Popliteal pulses are 2+ on the right side and 2+ on the left side.        Dorsalis pedis pulses are 2+ on the right side and 2+ on the left side.        Posterior tibial pulses are 2+ on the right side and 2+ on the left side.      Heart sounds: Normal heart sounds. No murmur heard.  Pulmonary:      Effort: Pulmonary effort is normal.      Breath sounds: Normal breath sounds.   Abdominal:      General: Bowel sounds are normal. There is no distension.      Palpations: Abdomen is soft.       Tenderness: There is no abdominal tenderness.   Musculoskeletal:      Cervical back: Neck supple.      Right lower leg: No edema.      Left lower leg: No edema.      Comments: Gait steady and strong without use of assistive devices   Lymphadenopathy:      Cervical: No cervical adenopathy.      Upper Body:      Right upper body: No supraclavicular adenopathy.      Left upper body: No supraclavicular adenopathy.   Skin:     General: Skin is warm and dry.      Capillary Refill: Capillary refill takes less than 2 seconds.      Findings: No erythema or rash.      Nails: There is no clubbing.             Comments: Sternotomy well healed.   Neurological:      Mental Status: He is alert and oriented to person, place, and time.      GCS: GCS eye subscore is 4. GCS verbal subscore is 5. GCS motor subscore is 6.   Psychiatric:         Attention and Perception: Attention and perception normal.         Mood and Affect: Mood and affect normal.         Speech: Speech normal.         Behavior: Behavior normal. Behavior is cooperative.         Thought Content: Thought content normal.         Cognition and Memory: Cognition and memory normal.         Judgment: Judgment normal.          Assessment:     Ascending aortic aneurysm (5 cm)/ proximal aortic arch aneurysm (3.8 to 4 cm)--s/p repair, stable  Bicuspid aortic valve with severe aortic stenosis/bicuspid aortopathy--s/p tissue AVR, stable  Asymmetric septal hypertrophy--s/p septal myectomy  HTN--stable  HLD--on Leqvio  Family history of bicuspid aortic valve--mother    Recommendation/Plan:       Continued risk factor modification of hypertension with a goal blood pressure less than 130/80, hyperlipidemia optimization, and continued avoidance of tobacco/nicotine products.    We discussed the importance of avoidance of over the counter decongestants and stimulants, specifically pseudoephedrine, for hypertension and aneurysm management.    Initiation of low aerobic exercise is  indicated to help reduce body habitus, assist with blood pressure and cholesterol control.       Although risk of rupture is low, emergency department presentation is warranted for acute chest, back, or abdominal pain, syncope, or stroke like symptoms.    Follow up in [facility] in one year(s) with CT scan of chest without contrast.  His aortic measurements are stable with any post repair dilatation.  His last echo shows appropriate gradients for his bioprosthetic valve.  His children have been checked for bicuspid valve issues.  He is practicing endocarditis prophylaxis with invasive procedures.    I spent approximately 30 minutes total in evaluating the data, examining the patient, discussing the options and counseling.  Independent interpretation of imaging.  Imaging shown to pt/wife.     Thank you for allowing us to participate in his care.    Regards,    KAIT Martin

## 2024-10-01 RX ORDER — SEMAGLUTIDE 2.68 MG/ML
INJECTION, SOLUTION SUBCUTANEOUS
Qty: 3 ML | Refills: 3 | Status: SHIPPED | OUTPATIENT
Start: 2024-10-01

## 2024-12-09 RX ORDER — AMLODIPINE BESYLATE 5 MG/1
TABLET ORAL
Qty: 90 TABLET | Refills: 3 | Status: SHIPPED | OUTPATIENT
Start: 2024-12-09

## 2024-12-12 ENCOUNTER — PATIENT ROUNDING (BHMG ONLY) (OUTPATIENT)
Dept: URGENT CARE | Facility: CLINIC | Age: 59
End: 2024-12-12
Payer: COMMERCIAL

## 2024-12-12 NOTE — ED NOTES
Thank you for letting us care for you during your recent visit at [facility]. We would love to hear about your experience with us.         We’re always looking for ways to make our patients’ experiences even better. Do you have any recommendations on ways we may improve?         I appreciate you taking the time to respond. Please be on the lookout for a survey about your recent visit from [facility] via text or email. We would greatly appreciate if you could fill that out and turn it back in. We want your voice to be heard and we value your feedback.         Thank you for choosing [facility] for your healthcare needs.

## 2024-12-26 ENCOUNTER — OFFICE VISIT (OUTPATIENT)
Dept: CARDIOLOGY | Facility: CLINIC | Age: 59
End: 2024-12-26
Payer: COMMERCIAL

## 2024-12-26 VITALS
OXYGEN SATURATION: 96 % | DIASTOLIC BLOOD PRESSURE: 82 MMHG | WEIGHT: 194 LBS | HEART RATE: 77 BPM | HEIGHT: 71 IN | SYSTOLIC BLOOD PRESSURE: 116 MMHG | BODY MASS INDEX: 27.16 KG/M2

## 2024-12-26 DIAGNOSIS — I35.0 NONRHEUMATIC AORTIC VALVE STENOSIS: ICD-10-CM

## 2024-12-26 DIAGNOSIS — G47.33 OSA (OBSTRUCTIVE SLEEP APNEA): ICD-10-CM

## 2024-12-26 DIAGNOSIS — I71.21 ANEURYSM OF ASCENDING AORTA WITHOUT RUPTURE: ICD-10-CM

## 2024-12-26 DIAGNOSIS — E11.9 TYPE 2 DIABETES MELLITUS WITHOUT COMPLICATION, WITHOUT LONG-TERM CURRENT USE OF INSULIN: ICD-10-CM

## 2024-12-26 DIAGNOSIS — Z95.3 STATUS POST AORTIC VALVE REPLACEMENT WITH TISSUE VALVE: ICD-10-CM

## 2024-12-26 DIAGNOSIS — E78.01 FAMILIAL HYPERCHOLESTEROLEMIA: ICD-10-CM

## 2024-12-26 DIAGNOSIS — Q23.81 CONGENITAL BICUSPID AORTIC VALVE: Primary | ICD-10-CM

## 2024-12-27 NOTE — PROGRESS NOTES
"    CARDIOLOGY        Patient Name: Brigido Caraballo  :1965  Age: 59 y.o.  Primary Cardiologist: Lucio Win MD  Encounter Provider:  KAIT Arciniega    Date of Service: 24      CHIEF COMPLAINT / REASON FOR OFFICE VISIT     Follow-up (Ascending aortic aneurysm repair, bicuspid aortic valve with valvular replacement/HTN/ZENON)      HISTORY OF PRESENT ILLNESS       HPI    Brigido Caraballo is a 59 y.o. male who presents today for reevaluation after starting Ozempic.    Pt has a  history significant for asending aortic aneurysm with repair on 10/29/2019, aortic valve stenosis with bicuspid aortic valve with repair on 10/29/2019, hypertension, obstructive sleep apnea, diabetes.    I have been following patient since 2023 when he was started on semaglutide for type 2 diabetes as well as weight management.      Patient notes that he has done well since the last assessment.  He is tolerating semaglutide without any adverse effects.  Reports that he does need his inclisiran reordered.  He is currently asymptomatic and denies chest pain, dyspnea, dyspnea with exertion, palpitations, lightheadedness, edema, fatigue.    The following portions of the patient's history were reviewed and updated as appropriate: allergies, current medications, past family history, past medical history, past social history, past surgical history and problem list.      VITAL SIGNS     Visit Vitals  /82 (BP Location: Left arm, Patient Position: Sitting, Cuff Size: Adult)   Pulse 77   Ht 180.3 cm (71\")   Wt 88 kg (194 lb)   SpO2 96%   BMI 27.06 kg/m²         Wt Readings from Last 3 Encounters:   24 88 kg (194 lb)   12/10/24 87.1 kg (192 lb)   24 84.8 kg (187 lb)     Body mass index is 27.06 kg/m².      REVIEW OF SYSTEMS   Review of Systems   Constitutional: Negative for malaise/fatigue, weight gain and weight loss.   Cardiovascular:  Negative for leg swelling and palpitations.   Respiratory:  Negative for snoring " and wheezing.    Hematologic/Lymphatic: Negative for bleeding problem. Does not bruise/bleed easily.   Skin:  Negative for color change.   Musculoskeletal:  Negative for falls and joint pain.   Gastrointestinal:  Negative for melena.   Genitourinary:  Negative for hematuria.   Neurological:  Negative for excessive daytime sleepiness.   Psychiatric/Behavioral:  Negative for depression. The patient is not nervous/anxious.            PHYSICAL EXAMINATION     Constitutional:       Appearance: Normal appearance. Well-developed.   Eyes:      Conjunctiva/sclera: Conjunctivae normal.   Neck:      Vascular: No carotid bruit.   Pulmonary:      Effort: Pulmonary effort is normal.      Breath sounds: Normal breath sounds.   Cardiovascular:      Normal rate. Regular rhythm. Normal S1. Normal S2.       Murmurs: There is no murmur.      No gallop.  No click. No rub.   Edema:     Peripheral edema absent.   Musculoskeletal: Normal range of motion. Skin:     General: Skin is warm and dry.   Neurological:      Mental Status: Alert and oriented to person, place, and time.      GCS: GCS eye subscore is 4. GCS verbal subscore is 5. GCS motor subscore is 6.   Psychiatric:         Speech: Speech normal.         Behavior: Behavior normal.         Thought Content: Thought content normal.         Judgment: Judgment normal.           REVIEWED DATA       ECG 12 Lead    Date/Time: 12/26/2024 1:02 PM  Performed by: Bertha Benítez APRN    Authorized by: Bertha Benítez APRN  Comparison: compared with previous ECG   Similar to previous ECG  Rhythm: sinus rhythm  Rate: normal  Conduction: right bundle branch block  ST Segments: ST segments normal  T Waves: T waves normal  QRS axis: normal    Clinical impression: abnormal EKG          Cardiac Procedures:  Lexiscan cardiac stress test 9/25/2015.  No evidence of ischemia.  Echocardiogram 9/25/2015.  EF 63%.  Normal diastolic function.  Bicuspid aortic valve.  Mild to moderate aortic stenosis  with peak gradient 34 mmHg and mean radiant 20 mmHg  Echocardiogram 8/12/2019.  EF 67%.  Grade 1 diastolic dysfunction noted.  Bicuspid aortic valve with moderate to severe aortic valve stenosis.  Maximum pressure gradient 57 mmHg, mean pressure gradient 34 mmHg.  Calculated RVSP 29 mmHg.  A sending aorta dilated measuring 4.1 cm.  Myocardial perfusion stress test 8/12/2019.  No evidence of ischemia.  EF 59%.  ST segment depression 1 mm noted during stress beginning at 3 minutes of stress.  Baseline ST depression.  Patient complains of pain.  Cardiac catheterization 9/24/2019.  Normal coronary arteries.  Elevated LV filling pressures 27 mmHg.  Peak to peak gradient across the aortic valve is 53 mmHg.  Asending aortic aneurysm repair and aortic valve replacement 10/29/2019.  Echocardiogram 12/27/2019.  LVEF 61-65%.  Mild to moderate LVH.  Normal RV cavity size and systolic function.  LAE.  25 mm bovine pericardial bioprosthetic valve present.  Aortic valve peak and mean gradients are within defined limits.  Mild tricuspid valve regurgitation.  Small to moderate posterior pericardial effusion without evidence of tamponade.  Calculated RVSP 24 mmHg.  Echocardiogram 11/30/2020.  LVEF 61-65%.  Moderate LVH.  Grade 1 diastolic dysfunction.  Normal RV cavity size and systolic function.  LAE.  Prosthetic aortic valve with normal gradients.  Maximum pressure gradient 24 mmHg, mean pressure gradient 12 mmHg.  Calculated RVSP 30 mmHg.  CTA chest 12/19/2020.  Aortic root measures 2.8 cm in diameter.  Ascending aorta measures 3.6 cm in diameter compared to 5 cm on prior study.  Echocardiogram 3/21/2024.  LVEF 62%.  Bioprosthetic aortic valve present which is unchanged from prior echocardiogram          ASSESSMENT & PLAN     Diagnoses and all orders for this visit:    1. Congenital bicuspid aortic valve (Primary)  Assessment & Plan:  Status post aortic valve replacement with stable gradients.      2. Status post aortic valve  replacement with tissue valve    3. Type 2 diabetes mellitus without complication, without long-term current use of insulin    4. Familial hypercholesterolemia  Overview:  Lipid Panel          2/26/2024    11:41   Lipid Panel   Total Cholesterol 135    Triglycerides 112    HDL Cholesterol 46    VLDL Cholesterol 20    LDL Cholesterol  69    LDL/HDL Ratio 1.45          Assessment & Plan:  Doing well with inclisiran injections, will reorder maintenance for the following year.    Orders:  -     Ambulatory Referral to [facility] For Infusion Treatment    5. Aneurysm of ascending aorta without rupture  Assessment & Plan:  S/p aneurysm repair 2019  Follows the aortic clinic and gets surveillance CTs      6. ZENON (obstructive sleep apnea)    7. Nonrheumatic aortic valve stenosis  Overview:  Added automatically from request for surgery [id number]      Other orders  -     Inclisiran Sodium solution prefilled syringe 284 mg          Future Appointments         Provider Department Center    6/30/2025 12:20 PM Adolfo Gifford MD [facility] CARDIOLOGY [place]    9/9/2025 9:30 AM Chayito Mistry APRN [facility] CARDIAC SURGERY [place]              MEDICATIONS         Discharge Medications            Accurate as of December 26, 2024 11:59 PM. If you have any questions, ask your nurse or doctor.                Continue These Medications        Instructions Start Date   amLODIPine 5 MG tablet  Commonly known as: NORVASC   TAKE ONE (1) TABLET BY MOUTH DAILY.      aspirin 81 MG EC tablet   81 mg, Daily      carvedilol 3.125 MG tablet  Commonly known as: COREG   TAKE ONE (1) TABLET BY MOUTH EVERY MORNING AND TWO (2) TABLETS EVERY EVENING.      CINNAMON PO   1 tablet, As Needed      Coenzyme Q10 10 MG capsule   200 mg, Daily      FLAX SEEDS PO   1,000 mg, Daily      Garlic 500 MG tablet   1 tablet, Daily      Ginger Root 500 MG capsule   550 mg, Daily      Ozempic (2 MG/DOSE) 8 MG/3ML solution  pen-injector  Generic drug: Semaglutide (2 MG/DOSE)   INJECT TWO (2) MG UNDER THE SKIN INTO THE APPROPRIATE AREA AS DIRECTED ONE (1) (ONE) TIME PER WEEK.      tamsulosin 0.4 MG capsule 24 hr capsule  Commonly known as: FLOMAX   No dose, route, or frequency recorded.      vitamin D3 125 MCG (5000 UT) capsule capsule   5,000 Units, Daily                   **Dragon Disclaimer:   Much of this encounter note is an electronic transcription/translation of spoken language to printed text. The electronic translation of spoken language may permit erroneous, or at times, nonsensical words or phrases to be inadvertently transcribed. Although I have reviewed the note for such errors, some may still exist.

## 2025-01-10 RX ORDER — CARVEDILOL 3.12 MG/1
TABLET ORAL
Qty: 270 TABLET | Refills: 3 | Status: SHIPPED | OUTPATIENT
Start: 2025-01-10

## 2025-01-17 ENCOUNTER — INFUSION (OUTPATIENT)
Dept: ONCOLOGY | Facility: HOSPITAL | Age: 60
End: 2025-01-17
Payer: COMMERCIAL

## 2025-01-17 ENCOUNTER — LAB (OUTPATIENT)
Dept: OTHER | Facility: HOSPITAL | Age: 60
End: 2025-01-17
Payer: COMMERCIAL

## 2025-01-17 DIAGNOSIS — E78.01 FAMILIAL HYPERCHOLESTEROLEMIA: Primary | ICD-10-CM

## 2025-01-17 DIAGNOSIS — E78.01 FAMILIAL HYPERCHOLESTEROLEMIA: ICD-10-CM

## 2025-01-17 DIAGNOSIS — I35.0 NONRHEUMATIC AORTIC VALVE STENOSIS: ICD-10-CM

## 2025-01-17 LAB
CHOLEST SERPL-MCNC: 134 MG/DL (ref 0–200)
HDLC SERPL-MCNC: 42 MG/DL (ref 40–60)
LDLC SERPL CALC-MCNC: 75 MG/DL (ref 0–100)
LDLC/HDLC SERPL: 1.77 {RATIO}
TRIGL SERPL-MCNC: 89 MG/DL (ref 0–150)
VLDLC SERPL-MCNC: 17 MG/DL (ref 5–40)

## 2025-01-17 PROCEDURE — 25010000002 INCLISIRAN SODIUM 284 MG/1.5ML SOLUTION PREFILLED SYRINGE: Performed by: NURSE PRACTITIONER

## 2025-01-17 PROCEDURE — 80061 LIPID PANEL: CPT | Performed by: NURSE PRACTITIONER

## 2025-01-17 PROCEDURE — 96372 THER/PROPH/DIAG INJ SC/IM: CPT

## 2025-01-17 PROCEDURE — 36415 COLL VENOUS BLD VENIPUNCTURE: CPT

## 2025-01-17 RX ADMIN — INCLISIRAN 284 MG: 284 INJECTION, SOLUTION SUBCUTANEOUS at 11:15

## 2025-01-17 NOTE — NURSING NOTE
Pt arrived for Leqvio injection with no new complaints. Fasting lipid panel drawn prior to injection. Pt tolerated injection well without complication. Reviewed next appointment. Encouraged pt to follow up with ordering provider with any questions or concerns. Pt v/u and was discharged in stable condition.

## 2025-01-20 ENCOUNTER — TELEPHONE (OUTPATIENT)
Dept: CARDIOLOGY | Facility: CLINIC | Age: 60
End: 2025-01-20
Payer: COMMERCIAL

## 2025-01-20 NOTE — TELEPHONE ENCOUNTER
Sondra/Luis,    This is a patient of Dr. Low. He said his insurance won't pay for Ozempic again. Often times it's because they want another PA. Could you see if a PA would work to get Ozempic covered? (Pt is worried because he has already missed a dose.)    Thank you,    Vikki Lou RN  Triage [facility]  01/20/25 08:43 EST

## 2025-01-27 ENCOUNTER — TELEPHONE (OUTPATIENT)
Dept: CARDIOLOGY | Facility: CLINIC | Age: 60
End: 2025-01-27
Payer: COMMERCIAL

## 2025-01-27 NOTE — TELEPHONE ENCOUNTER
GREER pt LOV with STEVIE 12/26/24.    Patient spouse is calling because the patient has been having some congestion and she wants to know what OTC meds he can take that are safe with his current cardiac meds and conditions.  I let her know to avoid decongestants and meds with pseudoephedrine and phenylephrine.  I advised that she could also consult with the pharmacist too.    If you have any different recommendations please let me know and I will call her back.    Lilia Dowell RN  [facility] Triage  01/27/25 09:17 EST

## 2025-02-18 RX ORDER — SEMAGLUTIDE 2.68 MG/ML
INJECTION, SOLUTION SUBCUTANEOUS
Qty: 3 ML | Refills: 3 | Status: SHIPPED | OUTPATIENT
Start: 2025-02-18

## 2025-03-13 ENCOUNTER — TELEPHONE (OUTPATIENT)
Dept: CARDIOLOGY | Age: 60
End: 2025-03-13

## 2025-03-13 NOTE — TELEPHONE ENCOUNTER
Caller: Brigido Caraballo    Relationship: Self    Best call back number: [phone]      PATIENT HAS A RING THAT HE WEARS NOW, MEASURES HIS VITALS  IT IS ALERTING HIM TO HIS HEART RATE DROPPING INTO THE 50'S WHILE SLEEPING.

## 2025-05-30 RX ORDER — SEMAGLUTIDE 2.68 MG/ML
INJECTION, SOLUTION SUBCUTANEOUS
Qty: 3 ML | Refills: 3 | Status: SHIPPED | OUTPATIENT
Start: 2025-05-30

## 2025-06-30 ENCOUNTER — OFFICE VISIT (OUTPATIENT)
Dept: CARDIOLOGY | Age: 60
End: 2025-06-30
Payer: COMMERCIAL

## 2025-06-30 VITALS
HEIGHT: 71 IN | OXYGEN SATURATION: 96 % | SYSTOLIC BLOOD PRESSURE: 115 MMHG | WEIGHT: 199 LBS | DIASTOLIC BLOOD PRESSURE: 60 MMHG | BODY MASS INDEX: 27.86 KG/M2 | HEART RATE: 93 BPM

## 2025-06-30 DIAGNOSIS — Z98.890 S/P ASCENDING AORTIC ANEURYSM REPAIR: ICD-10-CM

## 2025-06-30 DIAGNOSIS — Z86.79 S/P ASCENDING AORTIC ANEURYSM REPAIR: ICD-10-CM

## 2025-06-30 DIAGNOSIS — Z95.2 S/P AVR (AORTIC VALVE REPLACEMENT): Primary | ICD-10-CM

## 2025-06-30 DIAGNOSIS — Z98.890 S/P VENTRICULAR SEPTAL MYECTOMY: ICD-10-CM

## 2025-07-01 NOTE — PROGRESS NOTES
"      CARDIOLOGY    Adolfo Gifford MD    ENCOUNTER DATE:  06/30/2025    Brigido Caraballo / 60 y.o. / male        CHIEF COMPLAINT / REASON FOR OFFICE VISIT     Congenital bicuspid aortic valve (12/26/2024 Follow up)      HISTORY OF PRESENT ILLNESS       HPI  Brigido Caraballo is a 60 y.o. male who presents today for reevaluation.  Clinically he is doing great he has no complaints says he feels good breathing well with no swelling.      The following portions of the patient's history were reviewed and updated as appropriate: allergies, current medications, past family history, past medical history, past social history, past surgical history and problem list.      VITAL SIGNS     Visit Vitals  /60 (BP Location: Left arm)   Pulse 93   Ht 180.3 cm (71\")   Wt 90.3 kg (199 lb)   SpO2 96%   BMI 27.75 kg/m²         Wt Readings from Last 3 Encounters:   06/30/25 90.3 kg (199 lb)   12/26/24 88 kg (194 lb)   12/10/24 87.1 kg (192 lb)     Body mass index is 27.75 kg/m².      REVIEW OF SYSTEMS   ROS        PHYSICAL EXAMINATION     Vitals reviewed.   Constitutional:       Appearance: Healthy appearance.   Pulmonary:      Effort: Pulmonary effort is normal.   Cardiovascular:      Normal rate. Regular rhythm. Normal S1. Normal S2.       Murmurs: There is no murmur.      No gallop.  No click. No rub.   Pulses:     Intact distal pulses.   Edema:     Peripheral edema absent.   Neurological:      Mental Status: Alert.           REVIEWED DATA     Procedures    Cardiac Procedures:      Lipid Panel          1/17/2025    11:08   Lipid Panel   Total Cholesterol 134    Triglycerides 89    HDL Cholesterol 42    VLDL Cholesterol 17    LDL Cholesterol  75    LDL/HDL Ratio 1.77          ASSESSMENT & PLAN      Diagnosis Plan   1. S/P AVR (aortic valve replacement)        2. S/P ventricular septal myectomy        3. S/P ascending aortic aneurysm repair              SUMMARY/DISCUSSION  Aortic valve replacement clinically stable  Ventricular septal " myomectomy stable no symptoms  Ascending aortic repair.  Patient is being followed through the cardiothoracic surgical service.  Blood pressure is excellent clinically doing well follow-up in 1 year or sooner if issues.        MEDICATIONS         Discharge Medications            Accurate as of June 30, 2025 11:59 PM. If you have any questions, ask your nurse or doctor.                Continue These Medications        Instructions Start Date   amLODIPine 5 MG tablet  Commonly known as: NORVASC   TAKE ONE (1) TABLET BY MOUTH DAILY.      aspirin 81 MG EC tablet   81 mg, Daily      carvedilol 3.125 MG tablet  Commonly known as: COREG   TAKE ONE (1) TABLET BY MOUTH EVERY MORNING AND TWO (2) TABLETS EVERY EVENING.      CINNAMON PO   1 tablet, As Needed      Coenzyme Q10 10 MG capsule   200 mg, Daily      FLAX SEEDS PO   1,000 mg, Daily      Garlic 500 MG tablet   1 tablet, Daily      Ginger Root 500 MG capsule   550 mg, Daily      Ozempic (2 MG/DOSE) 8 MG/3ML solution pen-injector  Generic drug: Semaglutide (2 MG/DOSE)   INJECT TWO (2) MG UNDER THE SKIN INTO THE APPROPRIATE AREA AS DIRECTED ONE (1) (ONE) TIME PER WEEK.      tamsulosin 0.4 MG capsule 24 hr capsule  Commonly known as: FLOMAX   No dose, route, or frequency recorded.      vitamin D3 125 MCG (5000 UT) capsule capsule   5,000 Units, Daily                   **Dragon Disclaimer:   Much of this encounter note is an electronic transcription/translation of spoken language to printed text. The electronic translation of spoken language may permit erroneous, or at times, nonsensical words or phrases to be inadvertently transcribed. Although I have reviewed the note for such errors, some may still exist.

## 2025-07-18 ENCOUNTER — INFUSION (OUTPATIENT)
Dept: ONCOLOGY | Facility: HOSPITAL | Age: 60
End: 2025-07-18
Payer: COMMERCIAL

## 2025-07-18 DIAGNOSIS — I35.0 NONRHEUMATIC AORTIC VALVE STENOSIS: ICD-10-CM

## 2025-07-18 DIAGNOSIS — E78.01 FAMILIAL HYPERCHOLESTEROLEMIA: Primary | ICD-10-CM

## 2025-07-18 PROCEDURE — 25010000002 INCLISIRAN SODIUM 284 MG/1.5ML SOLUTION PREFILLED SYRINGE: Performed by: NURSE PRACTITIONER

## 2025-07-18 PROCEDURE — 96372 THER/PROPH/DIAG INJ SC/IM: CPT

## 2025-07-18 RX ADMIN — INCLISIRAN 284 MG: 284 INJECTION, SOLUTION SUBCUTANEOUS at 14:22

## 2025-07-18 NOTE — NURSING NOTE
Pt arrived for Leqvio infusion with no new complaints. Pt tolerated injection well. Instructed on how to schedule future appointments. He v/u and was discharged in stable condition.

## 2025-08-05 ENCOUNTER — TELEPHONE (OUTPATIENT)
Dept: CARDIAC SURGERY | Facility: CLINIC | Age: 60
End: 2025-08-05
Payer: COMMERCIAL

## 2025-08-21 ENCOUNTER — PATIENT MESSAGE (OUTPATIENT)
Dept: CARDIOLOGY | Age: 60
End: 2025-08-21
Payer: COMMERCIAL

## (undated) DEVICE — CANN RETRGR STYLET RSCP 15F

## (undated) DEVICE — CLAMP INSERT: Brand: STEALTH® CLAMP INSERT

## (undated) DEVICE — PK ATS CUST W CARDIOTOMY RESEVOIR

## (undated) DEVICE — SUT GORE CV5 PT 13IN 5N08A

## (undated) DEVICE — VESSEL LOOPS X-RAY DETECTABLE: Brand: DEROYAL

## (undated) DEVICE — 3M™ MEDIPORE™ H SOFT CLOTH SURGICAL TAPE 2862, 2 INCH X 10 YARD (5CM X 9,1M), 12 ROLLS/CASE: Brand: 3M™ MEDIPORE™

## (undated) DEVICE — SUT ETHIB 2/0 CV V5 30IN 10X52

## (undated) DEVICE — MYOCARDIAL PROBE NON-PYROGENIC: Brand: DEROYAL

## (undated) DEVICE — CANN AORT ROOT DLP VNT/8IN 14G 7F

## (undated) DEVICE — CATH VENT DLP W/CONN MALL NOVNT SILICON 16FR 16IN

## (undated) DEVICE — PREP SOL POVIDONE/IODINE BT 4OZ

## (undated) DEVICE — APPL CHLORAPREP W/TINT 10.5ML PERC STRL

## (undated) DEVICE — REDUCING CONN CANN/PUMP 3/8X1/4

## (undated) DEVICE — SUT PROLN 5/0 V5 36IN 8934H

## (undated) DEVICE — TOWEL,OR,DSP,ST,WHITE,DLX,4/PK,20PK/CS: Brand: MEDLINE

## (undated) DEVICE — OASIS DRAIN, SINGLE, INLINE & ATS COMPATIBLE: Brand: OASIS

## (undated) DEVICE — SOL ISO/ALC RUB 70PCT 4OZ

## (undated) DEVICE — BIOPATCH™ ANTIMICROBIAL DRESSING WITH CHLORHEXIDINE GLUCONATE IS A HYDROPHILLIC POLYURETHANE ABSORPTIVE FOAM WITH CHLORHEXIDINE GLUCONATE (CHG) WHICH INHIBITS BACTERIAL GROWTH UNDER THE DRESSING. THE DRESSING IS INTENDED TO BE USED TO ABSORB EXUDATE, COVER A WOUND CAUSED BY VASCULAR AND NONVASCULAR PERCUTANEOUS MEDICAL DEVICES DURING SURGERY, AS WELL AS REDUCE LOCAL INFECTION AND COLONIZATION OF MICROORGANISMS.: Brand: BIOPATCH

## (undated) DEVICE — SUT SILK 2/0 TIES 18IN A185H

## (undated) DEVICE — HEMOCONCENTRATOR PERFUS LPS06

## (undated) DEVICE — DRSNG SURESITE WNDW 4X4.5

## (undated) DEVICE — DRP SLUSH MACH FOR STND ALONE OM-ORS-321

## (undated) DEVICE — Device

## (undated) DEVICE — ANTIBACTERIAL UNDYED BRAIDED (POLYGLACTIN 910), SYNTHETIC ABSORBABLE SUTURE: Brand: COATED VICRYL

## (undated) DEVICE — CATH DIAG IMPULSE FR4 5F 100CM

## (undated) DEVICE — PK HEART OPN 40

## (undated) DEVICE — PK CATH CARD 40

## (undated) DEVICE — CATH DIAG IMPULSE FL5 5F 100CM

## (undated) DEVICE — CONTAINER,SPECIMEN,OR STERILE,4OZ: Brand: MEDLINE

## (undated) DEVICE — SUPPLIED AS A PAIR FOR USE IN JAWS OF RESUABLE CLAMPS.: Brand: INTRACK® INSERTS

## (undated) DEVICE — SUT ETHIBOND 2/0 CV V5  30IN PXX52

## (undated) DEVICE — OPTIFOAM GENTLE SA, POSTOP, 4X12: Brand: MEDLINE

## (undated) DEVICE — TBG ART PRESS 60 IN

## (undated) DEVICE — SEALANT HEMO SURG COSEAL PREMIX 8ML

## (undated) DEVICE — SINGLE STAGE VENOUS RETURN CANNULA: Brand: THIN-FLEX SINGLE STAGE VENOUS DRAINAGE CANNULA

## (undated) DEVICE — ST TOURNI COMPL A/ 7IN

## (undated) DEVICE — SUT PROLN 4/0 V5 36IN 8935H

## (undated) DEVICE — DRSNG WND STRIP OPTIFOAM AG SUPRABS A/MIC 4X12IN STRL

## (undated) DEVICE — SUT PROLN 3/0 V7 D/A 36IN 8976H

## (undated) DEVICE — SPNG GZ WOVN 4X4IN 12PLY 10/BX STRL

## (undated) DEVICE — GAUZE,SPONGE,4"X4",16PLY,XRAY,STRL,LF: Brand: MEDLINE

## (undated) DEVICE — TBG ART PRESS 24 IN

## (undated) DEVICE — PK PERFUS CUST W/CARDIOPLEGIA

## (undated) DEVICE — CATH DIAG IMPULSE AR2 5F 100CM

## (undated) DEVICE — SENSR CERBRL O2 PK/2

## (undated) DEVICE — ADAPT ANTEGRADE RETRGR

## (undated) DEVICE — 8 FOOT DISPOSABLE EXTENSION CABLE WITH SAFE CONNECT / ALLIGATOR CLIP

## (undated) DEVICE — SOL NACL 0.9PCT 1000ML

## (undated) DEVICE — TEMP PACING WIRE: Brand: MYO/WIRE

## (undated) DEVICE — CONN STR 1/2INX3/8IN

## (undated) DEVICE — SUT PROLN 4/0 BB D/A 36IN 8581H

## (undated) DEVICE — 28 FR STRAIGHT – SOFT PVC CATHETER: Brand: PVC THORACIC CATHETERS

## (undated) DEVICE — MARKR SKIN W/RULR AND LBL

## (undated) DEVICE — SUT SILK 2/0 SH CR8 18IN CR8 C012D

## (undated) DEVICE — SUT SILK 2 SUTUPAK TIE 60IN SA8H 2STRAND

## (undated) DEVICE — GLIDESHEATH SLENDER STAINLESS STEEL KIT: Brand: GLIDESHEATH SLENDER

## (undated) DEVICE — SUT SILK 0 CT1 CR8 18IN C021D

## (undated) DEVICE — CANN ART EOPA 3D NV W/CONN 20F

## (undated) DEVICE — GW EMR FIX EXCHG J STD .035 3MM 260CM

## (undated) DEVICE — IRRIGATOR BULB ASEPTO 60CC STRL

## (undated) DEVICE — CANN AORT ROOT DLP VNT 14G 7F

## (undated) DEVICE — KT MANIFLD CARDIAC

## (undated) DEVICE — TP UMB COTN 1/8X36 U12T

## (undated) DEVICE — SOL IRR H2O BTL 1000ML STRL

## (undated) DEVICE — ST. SORBAVIEW ULTIMATE IJ SYSTEM A,C: Brand: CENTURION

## (undated) DEVICE — CANN VESL CORNRY STR W/4MM BALN

## (undated) DEVICE — ST PERFUS M/

## (undated) DEVICE — RADIFOCUS OPTITORQUE ANGIOGRAPHIC CATHETER: Brand: OPTITORQUE

## (undated) DEVICE — PK PERFUS W/TB CUST ADLT

## (undated) DEVICE — DRN WND CH RND FUL/FLUT NO/TROC 3/8IN 28F

## (undated) DEVICE — SPNG DISECTOR KTNER XRAY COTN 1/4X9/16IN PK/5

## (undated) DEVICE — SOL IRR NACL 0.9PCT BT 1000ML

## (undated) DEVICE — CANN VESL CORNRY STR W/6MM BALN

## (undated) DEVICE — SYS PERFUS SEP PLATLT W TIPS CUST

## (undated) DEVICE — ORGANIZER SUT SHELIGH 3T 213013

## (undated) DEVICE — SUT PROLN 6/0 RB2 D/A 30IN 8711H

## (undated) DEVICE — DRSNG SURESITE WNDW 2.38X2.75

## (undated) DEVICE — GLV SURG BIOGEL LTX PF 7 1/2

## (undated) DEVICE — CATH VENT MIV RADL PIG ST TIP 5F 110CM